# Patient Record
Sex: FEMALE | Race: WHITE | NOT HISPANIC OR LATINO | Employment: FULL TIME | URBAN - METROPOLITAN AREA
[De-identification: names, ages, dates, MRNs, and addresses within clinical notes are randomized per-mention and may not be internally consistent; named-entity substitution may affect disease eponyms.]

---

## 2017-02-17 ENCOUNTER — OFFICE VISIT (OUTPATIENT)
Dept: DERMATOLOGY | Facility: CLINIC | Age: 57
End: 2017-02-17
Payer: COMMERCIAL

## 2017-02-17 VITALS — HEIGHT: 66 IN | WEIGHT: 195 LBS | BODY MASS INDEX: 31.34 KG/M2

## 2017-02-17 DIAGNOSIS — L82.1 SEBORRHEIC KERATOSES: ICD-10-CM

## 2017-02-17 DIAGNOSIS — D18.01 CHERRY ANGIOMA: ICD-10-CM

## 2017-02-17 DIAGNOSIS — L57.0 ACTINIC KERATOSES: Primary | ICD-10-CM

## 2017-02-17 DIAGNOSIS — L81.4 SOLAR LENTIGO: ICD-10-CM

## 2017-02-17 PROCEDURE — 99999 PR PBB SHADOW E&M-EST. PATIENT-LVL II: CPT | Mod: PBBFAC,,, | Performed by: DERMATOLOGY

## 2017-02-17 PROCEDURE — 17000 DESTRUCT PREMALG LESION: CPT | Mod: S$GLB,,, | Performed by: DERMATOLOGY

## 2017-02-17 PROCEDURE — 17003 DESTRUCT PREMALG LES 2-14: CPT | Mod: S$GLB,,, | Performed by: DERMATOLOGY

## 2017-02-17 PROCEDURE — 99203 OFFICE O/P NEW LOW 30 MIN: CPT | Mod: 25,S$GLB,, | Performed by: DERMATOLOGY

## 2017-02-17 NOTE — PATIENT INSTRUCTIONS

## 2017-02-17 NOTE — MR AVS SNAPSHOT
"    [facility]  [address]  [place] [address]  Phone: [phone]                  Bing Burch   2017 7:45 AM   Office Visit    Description:  Female : 1960   Provider:  Sarah Sheridan MD   Department:  [facility]           Reason for Visit     Skin Check           Diagnoses this Visit        Comments    Actinic keratoses    -  Primary     Solar lentigo         Seborrheic keratoses         Cherry angioma                To Do List           Future Appointments        Provider Department Dept Phone    2017 8:00 AM Bill Friedman MD [facility] [phone]      Goals (5 Years of Data)     None      Follow-Up and Disposition     Return in about 1 year (around 2018).    Follow-up and Disposition History      [facility]     [facility] -  Assistance  Registered nurses in the [facility] provide clinical advisement, health education, appointment booking, and other advisory services.  Call for this free service at [phone].             Medications           Message regarding Medications     Verify the changes and/or additions to your medication regime listed below are the same as discussed with your clinician today.  If any of these changes or additions are incorrect, please notify your healthcare provider.             Verify that the below list of medications is an accurate representation of the medications you are currently taking.  If none reported, the list may be blank. If incorrect, please contact your healthcare provider. Carry this list with you in case of emergency.           Current Medications     amitriptyline (ELAVIL) 50 MG tablet     esomeprazole (NEXIUM) 40 MG capsule Take 1 capsule (40 mg total) by mouth before breakfast.           Clinical Reference Information           Your Vitals Were     Height Weight BMI          5' 6" (1.676 m) 88.5 kg (195 lb) 31.47 kg/m2      "   Allergies as of 2/17/2017     No Known Allergies      Immunizations Administered on Date of Encounter - 2/17/2017     None      Instructions    CRYOSURGERY      Your doctor has used a method called cryosurgery to treat your skin condition. Cryosurgery refers to the use of very cold substances to treat a variety of skin conditions such as warts, pre-skin cancers, molluscum contagiosum, sun spots, and several benign growths. The substance we use in cryosurgery is liquid nitrogen and is so cold (-195 degrees Celsius) that is burns when administered.     Following treatment in the office, the skin may immediately burn and become red. You may find the area around the lesion is affected as well. It is sometimes necessary to treat not only the lesion, but a small area of the surrounding normal skin to achieve a good response.     A blister, and even a blood filled blister, may form after treatment.   This is a normal response. If the blister is painful, it is acceptable to sterilize a needle and with rubbing alcohol and gently pop the blister. It is important that you gently wash the area with soap and warm water as the blister fluid may contain wart virus if a wart was treated. Do no remove the roof of the blister.     The area treated can take anywhere from 1-3 weeks to heal. Healing time depends on the kind skin lesion treated, the location, and how aggressively the lesion was treated. It is recommended that the areas treated are covered with Vaseline or bacitracin ointment and a band-aid. If a band-aid is not practical, just ointment applied several times per day will do. Keeping these areas moist will speed the healing time.    Treatment with liquid nitrogen can leave a scar. In dark skin, it may be a light or dark scar, in light skin it may be a white or pink scar. These will generally fade with time, but may never go away completely.     If you have any concerns after your treatment, please feel free to call the  office.         [facility]  [facility]  [address] [place] [address]  Dept: [phone]         Language Assistance Services     ATTENTION: Language assistance services are available, free of charge. Please call [phone].      ATENCIÓN: Si habla español, tiene a collins disposición servicios gratuitos de asistencia lingüística. Llame al [phone].     CHÚ Ý: N?u b?n nói Ti?ng Vi?t, có các d?ch v? h? tr? ngôn ng? mi?n phí dành cho b?n. G?i s? [phone].         [facility] complies with applicable Federal civil rights laws and does not discriminate on the basis of race, color, national origin, age, disability, or sex.

## 2017-02-17 NOTE — PROGRESS NOTES
Subjective:       Patient ID:  Bing Burch is a 56 y.o. female who presents for   Chief Complaint   Patient presents with    Skin Check     FBSE,      HPI Comments: Initial visit  H/o intense sun exposure, youth in [place], blistyering sunburns  No h/o skin cancer, + h/o AK's treated with cryo        Review of Systems   Constitutional: Negative for fever and chills.   Skin: Positive for daily sunscreen use, activity-related sunscreen use and wears hat. Negative for recent sunburn.   Hematologic/Lymphatic: Does not bruise/bleed easily.        Objective:    Physical Exam   Constitutional: She appears well-developed and well-nourished. No distress.   Neurological: She is alert and oriented to person, place, and time. She is not disoriented.   Psychiatric: She has a normal mood and affect.   Skin:   Areas Examined (abnormalities noted in diagram):   Scalp / Hair Palpated and Inspected  Head / Face Inspection Performed  Neck Inspection Performed  Chest / Axilla Inspection Performed  Abdomen Inspection Performed  Genitals / Buttocks / Groin Inspection Performed  Back Inspection Performed  RUE Inspected  LUE Inspection Performed  RLE Inspected  LLE Inspection Performed  Nails and Digits Inspection Performed                   Diagram Legend     Erythematous scaling macule/papule c/w actinic keratosis       Vascular papule c/w angioma      Pigmented verrucoid papule/plaque c/w seborrheic keratosis      Yellow umbilicated papule c/w sebaceous hyperplasia      Irregularly shaped tan macule c/w lentigo     1-2 mm smooth white papules consistent with Milia      Movable subcutaneous cyst with punctum c/w epidermal inclusion cyst      Subcutaneous movable cyst c/w pilar cyst      Firm pink to brown papule c/w dermatofibroma      Pedunculated fleshy papule(s) c/w skin tag(s)      Evenly pigmented macule c/w junctional nevus     Mildly variegated pigmented, slightly irregular-bordered macule c/w mildly atypical nevus       Flesh colored to evenly pigmented papule c/w intradermal nevus       Pink pearly papule/plaque c/w basal cell carcinoma      Erythematous hyperkeratotic cursted plaque c/w SCC      Surgical scar with no sign of skin cancer recurrence      Open and closed comedones      Inflammatory papules and pustules      Verrucoid papule consistent consistent with wart     Erythematous eczematous patches and plaques     Dystrophic onycholytic nail with subungual debris c/w onychomycosis     Umbilicated papule    Erythematous-base heme-crusted tan verrucoid plaque consistent with inflamed seborrheic keratosis     Erythematous Silvery Scaling Plaque c/w Psoriasis     See annotation      Assessment / Plan:        Actinic keratoses  Cryosurgery Procedure Note    Verbal consent from the patient is obtained and the patient is aware of the precancerous quality and need for treatment of these lesions. Liquid nitrogen cryosurgery is applied to the 3 actinic keratoses, as detailed in the physical exam, to produce a freeze injury. The patient is aware that blisters may form and is instructed on wound care with gentle cleansing and use of vaseline ointment to keep moist until healed. The patient is supplied a handout on cryosurgery and is instructed to call if lesions do not completely resolve.    Solar lentigo  This is a benign hyperpigmented sun induced lesion. Daily sun protection will reduce the number of new lesions. Treatment of these benign lesions are considered cosmetic.    Seborrheic keratoses  These are benign inherited growths without a malignant potential. Reassurance given to patient. No treatment is necessary.     Cherry angioma  This is a benign vascular lesion. Reassurance given. No treatment required.     Patient instructed in importance in daily sun protection of at least spf 30. Sun avoidance and topical protection discussed.   Recommend Elta MD (physician office) COTZ sensitive (available online) for daily use on face  and neck.  Patient encouraged to wear hat for all outdoor exposure.   Also discussed sun protective clothing.               Return in about 1 year (around 2/17/2018).

## 2017-05-23 ENCOUNTER — HOSPITAL ENCOUNTER (EMERGENCY)
Facility: HOSPITAL | Age: 57
Discharge: HOME | End: 2017-05-23
Attending: EMERGENCY MEDICINE
Payer: COMMERCIAL

## 2017-05-23 VITALS
DIASTOLIC BLOOD PRESSURE: 95 MMHG | HEIGHT: 66 IN | HEART RATE: 86 BPM | TEMPERATURE: 97 F | SYSTOLIC BLOOD PRESSURE: 151 MMHG | WEIGHT: 184 LBS | OXYGEN SATURATION: 95 % | BODY MASS INDEX: 29.57 KG/M2 | RESPIRATION RATE: 18 BRPM

## 2017-05-23 DIAGNOSIS — K85.00 IDIOPATHIC ACUTE PANCREATITIS WITHOUT INFECTION OR NECROSIS: Primary | ICD-10-CM

## 2017-05-23 DIAGNOSIS — R10.9 ABDOMINAL PAIN: ICD-10-CM

## 2017-05-23 DIAGNOSIS — E73.9 LACTOSE INTOLERANCE IN ADULT: ICD-10-CM

## 2017-05-23 LAB
ALBUMIN SERPL BCP-MCNC: 4.2 G/DL
ALP SERPL-CCNC: 76 U/L
ALT SERPL W/O P-5'-P-CCNC: 19 U/L
ANION GAP SERPL CALC-SCNC: 11 MMOL/L
AST SERPL-CCNC: 16 U/L
BACTERIA #/AREA URNS HPF: NORMAL /HPF
BASOPHILS # BLD AUTO: 0 K/UL
BASOPHILS NFR BLD: 0.5 %
BILIRUB SERPL-MCNC: 1.4 MG/DL
BILIRUB UR QL STRIP: NEGATIVE
BUN SERPL-MCNC: 11 MG/DL
CALCIUM SERPL-MCNC: 9.8 MG/DL
CHLORIDE SERPL-SCNC: 106 MMOL/L
CLARITY UR: CLEAR
CO2 SERPL-SCNC: 23 MMOL/L
COLOR UR: YELLOW
CREAT SERPL-MCNC: 0.8 MG/DL
DIFFERENTIAL METHOD: NORMAL
EOSINOPHIL # BLD AUTO: 0.2 K/UL
EOSINOPHIL NFR BLD: 2.9 %
ERYTHROCYTE [DISTWIDTH] IN BLOOD BY AUTOMATED COUNT: 12.8 %
EST. GFR  (AFRICAN AMERICAN): >60 ML/MIN/1.73 M^2
EST. GFR  (NON AFRICAN AMERICAN): >60 ML/MIN/1.73 M^2
GLUCOSE SERPL-MCNC: 94 MG/DL
GLUCOSE UR QL STRIP: NEGATIVE
HCT VFR BLD AUTO: 41.3 %
HGB BLD-MCNC: 13.9 G/DL
HGB UR QL STRIP: NEGATIVE
KETONES UR QL STRIP: NEGATIVE
LEUKOCYTE ESTERASE UR QL STRIP: ABNORMAL
LIPASE SERPL-CCNC: 36 U/L
LYMPHOCYTES # BLD AUTO: 2.1 K/UL
LYMPHOCYTES NFR BLD: 27.9 %
MCH RBC QN AUTO: 30.2 PG
MCHC RBC AUTO-ENTMCNC: 33.5 %
MCV RBC AUTO: 90 FL
MICROSCOPIC COMMENT: NORMAL
MONOCYTES # BLD AUTO: 0.7 K/UL
MONOCYTES NFR BLD: 9.8 %
NEUTROPHILS # BLD AUTO: 4.4 K/UL
NEUTROPHILS NFR BLD: 58.9 %
NITRITE UR QL STRIP: NEGATIVE
PH UR STRIP: 6 [PH] (ref 5–8)
PLATELET # BLD AUTO: 252 K/UL
PMV BLD AUTO: 10.2 FL
POTASSIUM SERPL-SCNC: 4 MMOL/L
PROT SERPL-MCNC: 7.6 G/DL
PROT UR QL STRIP: NEGATIVE
RBC # BLD AUTO: 4.59 M/UL
RBC #/AREA URNS HPF: 0 /HPF (ref 0–4)
SODIUM SERPL-SCNC: 140 MMOL/L
SP GR UR STRIP: 1.01 (ref 1–1.03)
SQUAMOUS #/AREA URNS HPF: 1 /HPF
TROPONIN I SERPL DL<=0.01 NG/ML-MCNC: 0.01 NG/ML
URN SPEC COLLECT METH UR: ABNORMAL
UROBILINOGEN UR STRIP-ACNC: NEGATIVE EU/DL
WBC # BLD AUTO: 7.4 K/UL
WBC #/AREA URNS HPF: 5 /HPF (ref 0–5)

## 2017-05-23 PROCEDURE — 96374 THER/PROPH/DIAG INJ IV PUSH: CPT

## 2017-05-23 PROCEDURE — 84484 ASSAY OF TROPONIN QUANT: CPT

## 2017-05-23 PROCEDURE — 25000003 PHARM REV CODE 250: Performed by: EMERGENCY MEDICINE

## 2017-05-23 PROCEDURE — 83690 ASSAY OF LIPASE: CPT

## 2017-05-23 PROCEDURE — 63600175 PHARM REV CODE 636 W HCPCS: Performed by: EMERGENCY MEDICINE

## 2017-05-23 PROCEDURE — 99284 EMERGENCY DEPT VISIT MOD MDM: CPT | Mod: 25

## 2017-05-23 PROCEDURE — 85025 COMPLETE CBC W/AUTO DIFF WBC: CPT

## 2017-05-23 PROCEDURE — 81000 URINALYSIS NONAUTO W/SCOPE: CPT

## 2017-05-23 PROCEDURE — 93005 ELECTROCARDIOGRAM TRACING: CPT

## 2017-05-23 PROCEDURE — 80053 COMPREHEN METABOLIC PANEL: CPT

## 2017-05-23 PROCEDURE — 36415 COLL VENOUS BLD VENIPUNCTURE: CPT

## 2017-05-23 PROCEDURE — 25500020 PHARM REV CODE 255

## 2017-05-23 RX ORDER — ONDANSETRON 2 MG/ML
4 INJECTION INTRAMUSCULAR; INTRAVENOUS
Status: COMPLETED | OUTPATIENT
Start: 2017-05-23 | End: 2017-05-23

## 2017-05-23 RX ORDER — HYDROCODONE BITARTRATE AND ACETAMINOPHEN 5; 325 MG/1; MG/1
1 TABLET ORAL EVERY 8 HOURS PRN
Qty: 12 TABLET | Refills: 0 | Status: SHIPPED | OUTPATIENT
Start: 2017-05-23 | End: 2017-05-27

## 2017-05-23 RX ORDER — ESOMEPRAZOLE MAGNESIUM 40 MG/1
40 CAPSULE, DELAYED RELEASE ORAL DAILY
Qty: 30 CAPSULE | Refills: 1 | Status: SHIPPED | OUTPATIENT
Start: 2017-05-23 | End: 2018-03-29

## 2017-05-23 RX ORDER — ONDANSETRON 4 MG/1
4 TABLET, FILM COATED ORAL EVERY 6 HOURS
Qty: 12 TABLET | Refills: 0 | Status: SHIPPED | OUTPATIENT
Start: 2017-05-23 | End: 2017-10-17 | Stop reason: ALTCHOICE

## 2017-05-23 RX ADMIN — ONDANSETRON 4 MG: 2 INJECTION INTRAMUSCULAR; INTRAVENOUS at 11:05

## 2017-05-23 RX ADMIN — LIDOCAINE HYDROCHLORIDE: 20 SOLUTION ORAL; TOPICAL at 11:05

## 2017-05-23 RX ADMIN — IOHEXOL 75 ML: 350 INJECTION, SOLUTION INTRAVENOUS at 12:05

## 2017-05-23 NOTE — ED PROVIDER NOTES
"Encounter Date: 5/23/2017    SCRIBE #1 NOTE: I, Jarred Mukherjee, am scribing for, and in the presence of, Dr. Vallejo.       History     Chief Complaint   Patient presents with    Abdominal Pain     "started in my stomach and radiates into my chest and into by back" x1 day     Review of patient's allergies indicates:  No Known Allergies  05/23/2017  10:42 AM     Chief Complaint: Epigastric Abd pain      The patient is a 56 y.o. female with a PMHx of GERD who is presenting with an acute onset of epigastric abd pain that started yesterday. Her epigastric pain radiates up to her chest and to the center of her back. Pt described her pain as "excruciating" and rated her pain as an 11 out of 10. She reported a hx of similar pain 10 yrs ago, but a cause was never determined. Associated symptoms of nausea, left flank pain, and tingling in toes and fingertips. Pt also c/o constipation for a couple of days and she took 1 stool softener 2 days ago and 2 stool softeners 1 day ago. She c/o recently having trouble with gas. Pt reported taking Advil a couple of times per week and Nexium for GERD as needed. No SOB. No vomiting or diarrhea. No dysuria, difficultly urinating, or hematuria. No hx of kidney stones. No frequent EtOH use. No recent new medications. Pt has a past surgical history that includes Hernia repair; Tumor excision; Tonsillectomy; and Sinus surgery.        The history is provided by the patient.     Past Medical History:   Diagnosis Date    GERD (gastroesophageal reflux disease)      Past Surgical History:   Procedure Laterality Date    HERNIA REPAIR      SINUS SURGERY      TONSILLECTOMY      TUMOR EXCISION       Family History   Problem Relation Age of Onset    Psoriasis Brother     Eczema Neg Hx     Lupus Neg Hx     Melanoma Neg Hx      Social History   Substance Use Topics    Smoking status: Former Smoker     Quit date: 9/13/1989    Smokeless tobacco: Not on file    Alcohol use Yes      Comment: " occasionally     Review of Systems   Constitutional: Negative for fever.   HENT: Negative for congestion.    Eyes: Negative for visual disturbance.   Respiratory: Negative for shortness of breath and wheezing.    Cardiovascular: Positive for chest pain.   Gastrointestinal: Positive for abdominal pain (Epigastric abd pain.), constipation and nausea. Negative for diarrhea and vomiting.   Genitourinary: Positive for flank pain (Left flank pain.). Negative for difficulty urinating, dysuria and hematuria.   Musculoskeletal: Positive for back pain.   Skin: Negative for rash.   Neurological: Negative for syncope.        +Tingling in toes and fingertips.   Hematological: Does not bruise/bleed easily.   Psychiatric/Behavioral: Negative for confusion.       Physical Exam     Initial Vitals [05/23/17 1031]   BP Pulse Resp Temp SpO2   (!) 151/95 86 18 97.1 °F (36.2 °C) 95 %     Physical Exam    Nursing note and vitals reviewed.  Constitutional: She appears well-nourished.   HENT:   Head: Normocephalic and atraumatic.   Eyes: Conjunctivae and EOM are normal.   Neck: Normal range of motion. Neck supple. No thyroid mass present.   Cardiovascular: Normal rate and regular rhythm.   Abdominal: She exhibits no distension. There is tenderness (Minimal epigastric tenderness.). There is no rigidity.   Musculoskeletal: Normal range of motion.   Neurological: She is alert and oriented to person, place, and time.   Skin: Skin is warm and dry. No rash noted. No erythema.   Psychiatric: She has a normal mood and affect. Her speech is normal. Cognition and memory are normal.         ED Course   Procedures  Labs Reviewed - No data to display  EKG Readings: (Independently Interpreted)   Heart Rate: 75 bpm.   NSR.  Normal axis.  Normal intervals.  No acute ischemic changes.          Medical Decision Making:   Initial Assessment:   This patient was interviewed and examined and found to be in no acute distress.  At this time she is reporting  improvement in symptoms compared with earlier in the day.  She does report frequent worsening of bloating and gas production after starting her [employer] diet over the past 3-4 weeks which has also been accompanied by large amount of dairy food increase.  Screening lab work and imaging will be obtained to rule out evidence underlying severe organic disease as a cause.  Patient did have significant improvement in symptoms with Zofran and GI cocktail.  Differential Diagnosis:   DDX include, but are not limited to, atypical ACS, pancreatitis, cholecystitis, biliary colic, gastritis, obstruction, perforation, pulmonary embolism, thoracic aortic aneurysm  ED Management:  Screening lab work, including troponin and a CT scan of the abdomen and pelvis with contrast are unremarkable with exception of noted postoperative changes from prior Nissen fundoplication.  Patient does have very mild changes of acute pancreatitis which can be correlated clinically.  On reassessment she does report improvement and would like to attempt to try to control symptoms at home.  Reported no severe pain and vomiting since being here and I do think this is appropriate.  She will be sent home with a prescription for analgesic, antiemetic, and antitussive medication.  She is asked to maintain a bland diet and abstain from dairy foods, as I do think there is an underlying level of lactose intolerance instigating or worsening symptoms.  She is asked follow-up with her primary care doctor soon as possible or to return to the ER for any new, concerning, or worsening symptoms.  The patient was agreeable with this plan for follow-up she was discharged in stable condition with her  as .            Scribe Attestation:   Scribe #1: I performed the above scribed service and the documentation accurately describes the services I performed. I attest to the accuracy of the note.    Attending Attestation:           Physician Attestation for  Scribe:  Physician Attestation Statement for Scribe #1: I, Dr. Vallejo, reviewed documentation, as scribed by Jarred Mukherjee in my presence, and it is both accurate and complete.                 ED Course     Clinical Impression:   The primary encounter diagnosis was Idiopathic acute pancreatitis without infection or necrosis. Diagnoses of Abdominal pain and Lactose intolerance in adult were also pertinent to this visit.         Disposition:   Disposition: Discharged  Condition: Stable       Marco Antonio Vallejo MD  05/23/17 5495

## 2017-06-12 ENCOUNTER — DOCUMENTATION ONLY (OUTPATIENT)
Dept: FAMILY MEDICINE | Facility: CLINIC | Age: 57
End: 2017-06-12

## 2017-06-12 NOTE — PROGRESS NOTES
Pre-Visit Chart Review  For Appointment Scheduled on (06/13/17)    Health Maintenance Due   Topic Date Due    Lipid Panel  1960    TETANUS VACCINE  11/30/1977    Pap Smear with HPV Cotest  11/30/1980    Colonoscopy  11/30/2009

## 2017-06-13 ENCOUNTER — OFFICE VISIT (OUTPATIENT)
Dept: FAMILY MEDICINE | Facility: CLINIC | Age: 57
End: 2017-06-13
Payer: COMMERCIAL

## 2017-06-13 VITALS
WEIGHT: 188.69 LBS | HEIGHT: 66 IN | HEART RATE: 73 BPM | DIASTOLIC BLOOD PRESSURE: 76 MMHG | SYSTOLIC BLOOD PRESSURE: 114 MMHG | BODY MASS INDEX: 30.33 KG/M2

## 2017-06-13 DIAGNOSIS — Z87.19 HISTORY OF PANCREATITIS: Primary | ICD-10-CM

## 2017-06-13 PROCEDURE — 99213 OFFICE O/P EST LOW 20 MIN: CPT | Mod: S$GLB,,, | Performed by: FAMILY MEDICINE

## 2017-06-13 PROCEDURE — 99999 PR PBB SHADOW E&M-EST. PATIENT-LVL III: CPT | Mod: PBBFAC,,, | Performed by: FAMILY MEDICINE

## 2017-06-13 RX ORDER — HYDROCODONE BITARTRATE AND ACETAMINOPHEN 5; 325 MG/1; MG/1
1 TABLET ORAL EVERY 6 HOURS PRN
COMMUNITY
End: 2017-10-17 | Stop reason: ALTCHOICE

## 2017-06-13 NOTE — PROGRESS NOTES
Subjective:       Patient ID: Bing Burch is a 56 y.o. female.    Chief Complaint: Other (ED f/u acute pancreatitis)    HPI     ER follow up  Pt was recently seen in ED for abdominal pain.   She had a work up that revealed mild pancreatitis.   She reports that she has had a Nissen procedure performed around 2013.   Pt occasionally drinks EtOH but not often.   She has no history of EtOH abuse.   Pt has not had any abdominal pain.   She has made attempts at improving her diet with decreased fat and dairy.    Review of Systems   Constitutional: Negative for activity change and unexpected weight change.   HENT: Negative for hearing loss, rhinorrhea and trouble swallowing.    Eyes: Negative for discharge and visual disturbance.   Respiratory: Negative for chest tightness and wheezing.    Cardiovascular: Negative for chest pain and palpitations.   Gastrointestinal: Positive for constipation. Negative for blood in stool, diarrhea and vomiting.        Recent RUQ abdominal pain. Not currently present.   Endocrine: Negative for polydipsia and polyuria.   Genitourinary: Negative for difficulty urinating, dysuria, hematuria and menstrual problem.   Musculoskeletal: Negative for arthralgias, joint swelling and neck pain.   Neurological: Negative for weakness and headaches.   Psychiatric/Behavioral: Negative for confusion and dysphoric mood.       Objective:      Physical Exam   Constitutional: She appears well-developed and well-nourished.   HENT:   Head: Normocephalic and atraumatic.   Eyes: Conjunctivae are normal.   Neck: Normal range of motion.   Pulmonary/Chest: Effort normal.   Skin: Skin is warm.   Psychiatric: She has a normal mood and affect. Her behavior is normal.     Exam limited due to unforseen issues unrelated to this patient.  Assessment:       1. History of pancreatitis        Plan:       1. History of pancreatitis  - Ambulatory referral to Gastroenterology  - Lipid panel; Future    Portions of this  note were created using Dragon voice recognition software. There may be voice recognition errors found in the text, and attempts were made to correct these errors prior to signature    Bill Friedman MD    Family Medicine  6/13/2017

## 2017-07-20 ENCOUNTER — PATIENT MESSAGE (OUTPATIENT)
Dept: GASTROENTEROLOGY | Facility: CLINIC | Age: 57
End: 2017-07-20

## 2017-07-20 ENCOUNTER — TELEPHONE (OUTPATIENT)
Dept: GASTROENTEROLOGY | Facility: CLINIC | Age: 57
End: 2017-07-20

## 2017-08-01 ENCOUNTER — OFFICE VISIT (OUTPATIENT)
Dept: GASTROENTEROLOGY | Facility: CLINIC | Age: 57
End: 2017-08-01
Payer: COMMERCIAL

## 2017-08-01 VITALS
DIASTOLIC BLOOD PRESSURE: 70 MMHG | WEIGHT: 188.5 LBS | HEART RATE: 83 BPM | BODY MASS INDEX: 30.29 KG/M2 | SYSTOLIC BLOOD PRESSURE: 121 MMHG | HEIGHT: 66 IN

## 2017-08-01 DIAGNOSIS — K21.9 GASTROESOPHAGEAL REFLUX DISEASE, ESOPHAGITIS PRESENCE NOT SPECIFIED: Primary | ICD-10-CM

## 2017-08-01 DIAGNOSIS — K59.09 CHRONIC CONSTIPATION: ICD-10-CM

## 2017-08-01 DIAGNOSIS — Z98.890 HISTORY OF NISSEN FUNDOPLICATION: ICD-10-CM

## 2017-08-01 DIAGNOSIS — R10.13 EPIGASTRIC PAIN: ICD-10-CM

## 2017-08-01 DIAGNOSIS — R93.89 ABNORMAL CT SCAN: ICD-10-CM

## 2017-08-01 PROCEDURE — 99999 PR PBB SHADOW E&M-EST. PATIENT-LVL III: CPT | Mod: PBBFAC,,, | Performed by: INTERNAL MEDICINE

## 2017-08-01 PROCEDURE — 99244 OFF/OP CNSLTJ NEW/EST MOD 40: CPT | Mod: S$GLB,,, | Performed by: INTERNAL MEDICINE

## 2017-08-01 NOTE — PROGRESS NOTES
Subjective:       Patient ID: Bing Burch is a 56 y.o. female.    This patient is new to me.  Referring MD:  Dr. Friedman for GERD.      Chief Complaint: Gastroesophageal Reflux    Gastroesophageal Reflux   She complains of abdominal pain (epigastric) and heartburn. She reports no chest pain, no coughing, no dysphagia, no nausea, no sore throat or no wheezing. This is a new problem. The current episode started more than 1 month ago (about 6 months). The problem occurs frequently. The problem has been unchanged (abrupt onset). The heartburn duration is several minutes. The heartburn is of moderate intensity. The heartburn wakes her from sleep. The heartburn does not limit her activity. The heartburn changes with position. The symptoms are aggravated by certain foods. Pertinent negatives include no anemia, fatigue, melena or weight loss. Risk factors include hiatal hernia (Had Nissen in 2013 with no symptoms after surgery until now.  Prior to surgery had severe GERD). She has tried a PPI for the symptoms. The treatment provided mild relief. Past procedures include an EGD (last one was before surgery). Colonoscopy 7 years ago was normal per patient. Past invasive treatments include gastroplication.   Notes from Dr. Friedman indicate that the above symptoms are now ongoing x several months and she is referred for further evaluation.  She also has chronic constipation but no bleeding or weight loss.      Review of Systems   Constitutional: Negative for chills, fatigue, fever and weight loss.   HENT: Negative for sore throat and trouble swallowing.    Respiratory: Negative for cough, shortness of breath and wheezing.    Cardiovascular: Negative for chest pain and palpitations.   Gastrointestinal: Positive for abdominal pain (epigastric) and heartburn. Negative for blood in stool, dysphagia, melena, nausea and vomiting.   Genitourinary: Negative for dysuria and hematuria.   Musculoskeletal: Negative for arthralgias  "and myalgias.   Skin: Negative for color change and rash.   Neurological: Negative for dizziness and headaches.   Hematological: Negative for adenopathy.   Psychiatric/Behavioral: Negative for confusion. The patient is not nervous/anxious.    All other systems reviewed and are negative.      Objective:       Vitals:    08/01/17 1447   BP: 121/70   Pulse: 83   Weight: 85.5 kg (188 lb 7.9 oz)   Height: 5' 6" (1.676 m)       Physical Exam   Constitutional: She appears well-developed and well-nourished.   HENT:   Head: Normocephalic and atraumatic.   Eyes: Pupils are equal, round, and reactive to light. No scleral icterus.   Neck: Normal range of motion.   Cardiovascular: Normal rate and regular rhythm.    No murmur heard.  Pulmonary/Chest: Effort normal and breath sounds normal. She has no wheezes.   Abdominal: Soft. Bowel sounds are normal. She exhibits no distension. There is no tenderness.   Musculoskeletal: She exhibits no edema or tenderness.   Lymphadenopathy:     She has no cervical adenopathy.   Neurological: She is alert.   Skin: Skin is warm and dry. No rash noted.         Lab Results   Component Value Date    WBC 7.40 05/23/2017    HGB 13.9 05/23/2017    HCT 41.3 05/23/2017    MCV 90 05/23/2017     05/23/2017       CMP  Sodium   Date Value Ref Range Status   05/23/2017 140 136 - 145 mmol/L Final     Potassium   Date Value Ref Range Status   05/23/2017 4.0 3.5 - 5.1 mmol/L Final     Chloride   Date Value Ref Range Status   05/23/2017 106 95 - 110 mmol/L Final     CO2   Date Value Ref Range Status   05/23/2017 23 23 - 29 mmol/L Final     Glucose   Date Value Ref Range Status   05/23/2017 94 70 - 110 mg/dL Final     BUN, Bld   Date Value Ref Range Status   05/23/2017 11 6 - 20 mg/dL Final     Creatinine   Date Value Ref Range Status   05/23/2017 0.8 0.5 - 1.4 mg/dL Final     Calcium   Date Value Ref Range Status   05/23/2017 9.8 8.7 - 10.5 mg/dL Final     Total Protein   Date Value Ref Range Status "   05/23/2017 7.6 6.0 - 8.4 g/dL Final     Albumin   Date Value Ref Range Status   05/23/2017 4.2 3.5 - 5.2 g/dL Final     Total Bilirubin   Date Value Ref Range Status   05/23/2017 1.4 (H) 0.1 - 1.0 mg/dL Final     Comment:     For infants and newborns, interpretation of results should be based  on gestational age, weight and in agreement with clinical  observations.  Premature Infant recommended reference ranges:  Up to 24 hours.............<8.0 mg/dL  Up to 48 hours............<12.0 mg/dL  3-5 days..................<15.0 mg/dL  6-29 days.................<15.0 mg/dL       Alkaline Phosphatase   Date Value Ref Range Status   05/23/2017 76 55 - 135 U/L Final     AST   Date Value Ref Range Status   05/23/2017 16 10 - 40 U/L Final     ALT   Date Value Ref Range Status   05/23/2017 19 10 - 44 U/L Final     Anion Gap   Date Value Ref Range Status   05/23/2017 11 8 - 16 mmol/L Final     eGFR if    Date Value Ref Range Status   05/23/2017 >60 >60 mL/min/1.73 m^2 Final     eGFR if non    Date Value Ref Range Status   05/23/2017 >60 >60 mL/min/1.73 m^2 Final     Comment:     Calculation used to obtain the estimated glomerular filtration  rate (eGFR) is the CKD-EPI equation. Since race is unknown   in our information system, the eGFR values for   -American and Non--American patients are given   for each creatinine result.         CT scan was independently visualized and reviewed by me and showed fundoplication.  She also had questionable haziness at pancreatic body with no mass noted and normal enzymes.    Old records from Dr. Friedman reviewed and are as summarized above in the HPI.      Assessment:       1. Gastroesophageal reflux disease, esophagitis presence not specified    2. Chronic constipation    3. Epigastric pain    4. Abnormal CT scan    5. History of Nissen fundoplication        Plan:       1.  Schedule EGD  2.  Continue PPI  3.  Colonoscopy due for screening in 3  years  4.  Recommend daily exercise, adequate water intake, and high fiber diet.  Recommend daily miralax (17g PO once or twice daily) with intermittently dosed dulcolax (every 2-3 days) to facilitate bowel movements.  If no relief with this, consider adding emollient laxative (castor oil or mineral oil) +/- enema.  5.  Consider follow up imaging of pancreas at some point but will review CT with radiology first to see if this is necessary  6.  Further recommendations to follow after above.  7.  Communication will be sent to the referring MD, Dr. Friedman regarding my assessment and plan on this patient via EPIC.

## 2017-08-02 ENCOUNTER — LAB VISIT (OUTPATIENT)
Dept: LAB | Facility: HOSPITAL | Age: 57
End: 2017-08-02
Attending: FAMILY MEDICINE
Payer: COMMERCIAL

## 2017-08-02 ENCOUNTER — TELEPHONE (OUTPATIENT)
Dept: GASTROENTEROLOGY | Facility: CLINIC | Age: 57
End: 2017-08-02

## 2017-08-02 DIAGNOSIS — Z87.19 HISTORY OF PANCREATITIS: ICD-10-CM

## 2017-08-02 DIAGNOSIS — R93.89 ABNORMAL CT SCAN: Primary | ICD-10-CM

## 2017-08-02 LAB
CHOLEST/HDLC SERPL: 3.1 {RATIO}
HDL/CHOLESTEROL RATIO: 32.4 %
HDLC SERPL-MCNC: 238 MG/DL
HDLC SERPL-MCNC: 77 MG/DL
LDLC SERPL CALC-MCNC: 137.8 MG/DL
NONHDLC SERPL-MCNC: 161 MG/DL
TRIGL SERPL-MCNC: 116 MG/DL

## 2017-08-02 PROCEDURE — 80061 LIPID PANEL: CPT

## 2017-08-02 PROCEDURE — 36415 COLL VENOUS BLD VENIPUNCTURE: CPT | Mod: PO

## 2017-08-03 ENCOUNTER — TELEPHONE (OUTPATIENT)
Dept: FAMILY MEDICINE | Facility: CLINIC | Age: 57
End: 2017-08-03

## 2017-08-03 NOTE — TELEPHONE ENCOUNTER
The 10-year ASCVD risk score (Roma MEJIAS Jr., et al., 2013) is: 1.9%    Values used to calculate the score:      Age: 56 years      Sex: Female      Is Non- : No      Diabetic: No      Tobacco smoker: No      Systolic Blood Pressure: 121 mmHg      Is BP treated: No      HDL Cholesterol: 77 mg/dL      Total Cholesterol: 238 mg/dL

## 2017-08-08 ENCOUNTER — HOSPITAL ENCOUNTER (OUTPATIENT)
Dept: RADIOLOGY | Facility: HOSPITAL | Age: 57
Discharge: HOME | End: 2017-08-08
Attending: INTERNAL MEDICINE
Payer: COMMERCIAL

## 2017-08-08 DIAGNOSIS — R93.89 ABNORMAL CT SCAN: ICD-10-CM

## 2017-08-08 PROCEDURE — 76376 3D RENDER W/INTRP POSTPROCES: CPT | Mod: 26,,, | Performed by: RADIOLOGY

## 2017-08-08 PROCEDURE — 25500020 PHARM REV CODE 255: Performed by: INTERNAL MEDICINE

## 2017-08-08 PROCEDURE — 74183 MRI ABD W/O CNTR FLWD CNTR: CPT | Mod: 26,,, | Performed by: RADIOLOGY

## 2017-08-08 PROCEDURE — A9585 GADOBUTROL INJECTION: HCPCS | Performed by: INTERNAL MEDICINE

## 2017-08-08 PROCEDURE — 74183 MRI ABD W/O CNTR FLWD CNTR: CPT | Mod: TC

## 2017-08-08 PROCEDURE — 76376 3D RENDER W/INTRP POSTPROCES: CPT | Mod: TC

## 2017-08-08 RX ORDER — GADOBUTROL 604.72 MG/ML
INJECTION INTRAVENOUS
Status: DISPENSED
Start: 2017-08-08 | End: 2017-08-08

## 2017-08-08 RX ORDER — GADOBUTROL 604.72 MG/ML
8 INJECTION INTRAVENOUS
Status: COMPLETED | OUTPATIENT
Start: 2017-08-08 | End: 2017-08-08

## 2017-08-08 RX ADMIN — GADOBUTROL 8 ML: 604.72 INJECTION INTRAVENOUS at 08:08

## 2017-08-09 ENCOUNTER — TELEPHONE (OUTPATIENT)
Dept: GASTROENTEROLOGY | Facility: CLINIC | Age: 57
End: 2017-08-09

## 2017-08-18 ENCOUNTER — ANESTHESIA (OUTPATIENT)
Dept: ENDOSCOPY | Facility: HOSPITAL | Age: 57
End: 2017-08-18
Payer: COMMERCIAL

## 2017-08-18 ENCOUNTER — ANESTHESIA EVENT (OUTPATIENT)
Dept: ENDOSCOPY | Facility: HOSPITAL | Age: 57
End: 2017-08-18
Payer: COMMERCIAL

## 2017-08-18 ENCOUNTER — HOSPITAL ENCOUNTER (OUTPATIENT)
Facility: HOSPITAL | Age: 57
Discharge: HOME | End: 2017-08-18
Attending: INTERNAL MEDICINE | Admitting: INTERNAL MEDICINE
Payer: COMMERCIAL

## 2017-08-18 ENCOUNTER — SURGERY (OUTPATIENT)
Age: 57
End: 2017-08-18

## 2017-08-18 VITALS — RESPIRATION RATE: 19 BRPM

## 2017-08-18 DIAGNOSIS — K29.70 GASTRITIS, PRESENCE OF BLEEDING UNSPECIFIED, UNSPECIFIED CHRONICITY, UNSPECIFIED GASTRITIS TYPE: ICD-10-CM

## 2017-08-18 DIAGNOSIS — K21.9 GASTROESOPHAGEAL REFLUX DISEASE WITHOUT ESOPHAGITIS: ICD-10-CM

## 2017-08-18 DIAGNOSIS — K31.7 GASTRIC POLYP: Primary | ICD-10-CM

## 2017-08-18 DIAGNOSIS — K21.9 ACID REFLUX: ICD-10-CM

## 2017-08-18 PROCEDURE — 43239 EGD BIOPSY SINGLE/MULTIPLE: CPT | Mod: 59,,, | Performed by: INTERNAL MEDICINE

## 2017-08-18 PROCEDURE — D9220A PRA ANESTHESIA: Mod: CRNA,,, | Performed by: NURSE ANESTHETIST, CERTIFIED REGISTERED

## 2017-08-18 PROCEDURE — 43239 EGD BIOPSY SINGLE/MULTIPLE: CPT | Performed by: INTERNAL MEDICINE

## 2017-08-18 PROCEDURE — 27201012 HC FORCEPS, HOT/COLD, DISP: Performed by: INTERNAL MEDICINE

## 2017-08-18 PROCEDURE — 63600175 PHARM REV CODE 636 W HCPCS: Performed by: NURSE ANESTHETIST, CERTIFIED REGISTERED

## 2017-08-18 PROCEDURE — 37000008 HC ANESTHESIA 1ST 15 MINUTES: Performed by: INTERNAL MEDICINE

## 2017-08-18 PROCEDURE — 88342 IMHCHEM/IMCYTCHM 1ST ANTB: CPT | Mod: 26,,, | Performed by: PATHOLOGY

## 2017-08-18 PROCEDURE — 27201089 HC SNARE, DISP (ANY): Performed by: INTERNAL MEDICINE

## 2017-08-18 PROCEDURE — 88305 TISSUE EXAM BY PATHOLOGIST: CPT | Performed by: PATHOLOGY

## 2017-08-18 PROCEDURE — 88305 TISSUE EXAM BY PATHOLOGIST: CPT | Mod: 26,,, | Performed by: PATHOLOGY

## 2017-08-18 PROCEDURE — 43251 EGD REMOVE LESION SNARE: CPT | Mod: ,,, | Performed by: INTERNAL MEDICINE

## 2017-08-18 PROCEDURE — 25000003 PHARM REV CODE 250: Performed by: INTERNAL MEDICINE

## 2017-08-18 PROCEDURE — 37000009 HC ANESTHESIA EA ADD 15 MINS: Performed by: INTERNAL MEDICINE

## 2017-08-18 PROCEDURE — 43251 EGD REMOVE LESION SNARE: CPT | Performed by: INTERNAL MEDICINE

## 2017-08-18 PROCEDURE — D9220A PRA ANESTHESIA: Mod: ANES,,, | Performed by: ANESTHESIOLOGY

## 2017-08-18 RX ORDER — SODIUM CHLORIDE 9 MG/ML
INJECTION, SOLUTION INTRAVENOUS CONTINUOUS
Status: DISCONTINUED | OUTPATIENT
Start: 2017-08-18 | End: 2017-08-18 | Stop reason: HOSPADM

## 2017-08-18 RX ORDER — LIDOCAINE HYDROCHLORIDE 20 MG/ML
INJECTION, SOLUTION EPIDURAL; INFILTRATION; INTRACAUDAL; PERINEURAL
Status: DISCONTINUED
Start: 2017-08-18 | End: 2017-08-18 | Stop reason: HOSPADM

## 2017-08-18 RX ORDER — LIDOCAINE HCL/PF 100 MG/5ML
SYRINGE (ML) INTRAVENOUS
Status: DISCONTINUED | OUTPATIENT
Start: 2017-08-18 | End: 2017-08-18

## 2017-08-18 RX ORDER — PROPOFOL 10 MG/ML
INJECTION, EMULSION INTRAVENOUS
Status: DISCONTINUED
Start: 2017-08-18 | End: 2017-08-18 | Stop reason: HOSPADM

## 2017-08-18 RX ORDER — PROPOFOL 10 MG/ML
VIAL (ML) INTRAVENOUS
Status: DISCONTINUED | OUTPATIENT
Start: 2017-08-18 | End: 2017-08-18

## 2017-08-18 RX ADMIN — PROPOFOL 50 MG: 10 INJECTION, EMULSION INTRAVENOUS at 09:08

## 2017-08-18 RX ADMIN — LIDOCAINE HYDROCHLORIDE 100 MG: 20 INJECTION, SOLUTION INTRAVENOUS at 09:08

## 2017-08-18 RX ADMIN — SODIUM CHLORIDE 1000 ML: 0.9 INJECTION, SOLUTION INTRAVENOUS at 09:08

## 2017-08-18 RX ADMIN — PROPOFOL 100 MG: 10 INJECTION, EMULSION INTRAVENOUS at 09:08

## 2017-08-18 NOTE — TRANSFER OF CARE
"Anesthesia Transfer of Care Note    Patient: Bing Burch    Procedure(s) Performed: Procedure(s) (LRB):  ESOPHAGOGASTRODUODENOSCOPY (EGD) (N/A)    Patient location: GI    Anesthesia Type: general    Transport from OR: Transported from OR on room air with adequate spontaneous ventilation    Post pain: adequate analgesia    Post assessment: no apparent anesthetic complications    Post vital signs: stable    Level of consciousness: awake    Nausea/Vomiting: no nausea/vomiting    Complications: none    Transfer of care protocol was followed      Last vitals:   Visit Vitals  BP (!) 129/59   Pulse 77   Temp 36.4 °C (97.6 °F) (Oral)   Resp 17   Ht 5' 6" (1.676 m)   Wt 85.3 kg (188 lb)   SpO2 96%   Breastfeeding? No   BMI 30.34 kg/m²     "

## 2017-08-18 NOTE — PLAN OF CARE
Pt. Awake, alert and oriented.  No complaints of pain.  Vitals within defined limits.  Ready for discharge.  Meets discharge criteria.

## 2017-08-18 NOTE — ANESTHESIA POSTPROCEDURE EVALUATION
"Anesthesia Post Evaluation    Patient: Bing Burch    Procedure(s) Performed: Procedure(s) (LRB):  ESOPHAGOGASTRODUODENOSCOPY (EGD) (N/A)    Final Anesthesia Type: general  Patient location during evaluation: PACU  Patient participation: Yes- Able to Participate  Level of consciousness: awake and alert  Post-procedure vital signs: reviewed and stable  Pain management: adequate  Airway patency: patent  PONV status at discharge: No PONV  Anesthetic complications: no      Cardiovascular status: hemodynamically stable  Respiratory status: unassisted and room air  Hydration status: euvolemic  Follow-up not needed.        Visit Vitals  /61   Pulse 72   Temp 36.6 °C (97.9 °F)   Resp 13   Ht 5' 6" (1.676 m)   Wt 85.3 kg (188 lb)   SpO2 99%   Breastfeeding? No   BMI 30.34 kg/m²       Pain/Bran Score: Pain Assessment Performed: Yes (8/18/2017  9:49 AM)  Presence of Pain: denies (8/18/2017 10:25 AM)  Bran Score: 10 (8/18/2017 10:20 AM)      "

## 2017-08-18 NOTE — ANESTHESIA PREPROCEDURE EVALUATION
08/18/2017  Bing Burch is a 56 y.o., female.    Anesthesia Evaluation    I have reviewed the Patient Summary Reports.    I have reviewed the Nursing Notes.   I have reviewed the Medications.     Review of Systems  Anesthesia Hx:  No problems with previous Anesthesia    Social:  Former Smoker    Hematology/Oncology:  Hematology Normal   Oncology Normal     EENT/Dental:EENT/Dental Normal   Cardiovascular:  Cardiovascular Normal     Pulmonary:  Pulmonary Normal    Renal/:  Renal/ Normal     Hepatic/GI:   GERD    Musculoskeletal:  Musculoskeletal Normal    Neurological:  Neurology Normal    Endocrine:  Endocrine Normal    Dermatological:  Skin Normal    Psych:  Psychiatric Normal           Physical Exam  General:  Obesity    Airway/Jaw/Neck:  Airway Findings: Mouth Opening: Normal Tongue: Normal  General Airway Assessment: Adult  Mallampati: I  TM Distance: Normal, at least 6 cm        Eyes/Ears/Nose:  EYES/EARS/NOSE FINDINGS: Normal   Dental:  DENTAL FINDINGS: Normal   Chest/Lungs:  Chest/Lungs Findings: Clear to auscultation, Normal Respiratory Rate     Heart/Vascular:  Heart Findings: Rate: Normal  Rhythm: Regular Rhythm  Sounds: Normal     Abdomen:  Abdomen Findings: Normal    Musculoskeletal:  Musculoskeletal Findings: Normal   Skin:  Skin Findings: Normal    Mental Status:  Mental Status Findings: Normal        Anesthesia Plan  Type of Anesthesia, risks & benefits discussed:  Anesthesia Type:  general  Patient's Preference:   Intra-op Monitoring Plan: standard ASA monitors  Intra-op Monitoring Plan Comments:   Post Op Pain Control Plan:   Post Op Pain Control Plan Comments:   Induction:    Beta Blocker:  Patient is not currently on a Beta-Blocker (No further documentation required).       Informed Consent: Patient understands risks and agrees with Anesthesia plan.  Questions answered.  Anesthesia consent signed with patient.  ASA Score: 2     Day of Surgery Review of History & Physical: I have interviewed and examined the patient. I have reviewed the patient's H&P dated:    H&P update referred to the provider.         Ready For Surgery From Anesthesia Perspective.

## 2017-08-18 NOTE — DISCHARGE INSTRUCTIONS
Have you had a colonoscopy LESS THAN 3 years ago?   * If YES, answer these questions*:    1. Did patient have a prior colonic polyp in a previous surveillance/diagnostic colonoscopy and is 18 years or older on date of encounter?    2. Documentation of < 3 year interval since the patients last colonoscopy due to medical reasons (eg., last colonoscopy incomplete, last colonoscopy had inadequate prep, piecemeal removal of adenomas, or last colonoscopy found > 10 adenomas) ?

## 2017-08-21 VITALS
BODY MASS INDEX: 30.22 KG/M2 | OXYGEN SATURATION: 99 % | WEIGHT: 188 LBS | DIASTOLIC BLOOD PRESSURE: 61 MMHG | TEMPERATURE: 98 F | HEIGHT: 66 IN | SYSTOLIC BLOOD PRESSURE: 113 MMHG | HEART RATE: 72 BPM | RESPIRATION RATE: 13 BRPM

## 2017-08-29 ENCOUNTER — TELEPHONE (OUTPATIENT)
Dept: GASTROENTEROLOGY | Facility: CLINIC | Age: 57
End: 2017-08-29

## 2017-10-16 ENCOUNTER — TELEPHONE (OUTPATIENT)
Dept: FAMILY MEDICINE | Facility: CLINIC | Age: 57
End: 2017-10-16

## 2017-10-16 NOTE — TELEPHONE ENCOUNTER
----- Message from Cary Oniel sent at 10/16/2017  9:43 AM CDT -----  Contact: patient  Patient calling in regards to her having a sinus infection and she also has Laryngitis. She is requesting an antibiotic. Please advise.  Call back   Thanks!  [id number] [place] - [address]  [address] [place] [address]  Phone: [phone] Fax: [phone]

## 2017-10-17 ENCOUNTER — OFFICE VISIT (OUTPATIENT)
Dept: FAMILY MEDICINE | Facility: CLINIC | Age: 57
End: 2017-10-17
Payer: COMMERCIAL

## 2017-10-17 ENCOUNTER — DOCUMENTATION ONLY (OUTPATIENT)
Dept: FAMILY MEDICINE | Facility: CLINIC | Age: 57
End: 2017-10-17

## 2017-10-17 ENCOUNTER — TELEPHONE (OUTPATIENT)
Dept: FAMILY MEDICINE | Facility: CLINIC | Age: 57
End: 2017-10-17

## 2017-10-17 VITALS
DIASTOLIC BLOOD PRESSURE: 75 MMHG | HEART RATE: 102 BPM | BODY MASS INDEX: 30.82 KG/M2 | HEIGHT: 66 IN | TEMPERATURE: 99 F | SYSTOLIC BLOOD PRESSURE: 136 MMHG | WEIGHT: 191.81 LBS

## 2017-10-17 DIAGNOSIS — J32.9 SINUSITIS, UNSPECIFIED CHRONICITY, UNSPECIFIED LOCATION: Primary | ICD-10-CM

## 2017-10-17 PROCEDURE — 99999 PR PBB SHADOW E&M-EST. PATIENT-LVL III: CPT | Mod: PBBFAC,,, | Performed by: PHYSICIAN ASSISTANT

## 2017-10-17 PROCEDURE — 99213 OFFICE O/P EST LOW 20 MIN: CPT | Mod: S$GLB,,, | Performed by: PHYSICIAN ASSISTANT

## 2017-10-17 RX ORDER — AMOXICILLIN AND CLAVULANATE POTASSIUM 875; 125 MG/1; MG/1
1 TABLET, FILM COATED ORAL EVERY 12 HOURS
Qty: 20 TABLET | Refills: 0 | Status: SHIPPED | OUTPATIENT
Start: 2017-10-17 | End: 2017-10-27

## 2017-10-17 NOTE — PROGRESS NOTES
Subjective:       Patient ID: Bing Burch is a 56 y.o. female.    Chief Complaint: Cough (congestion) and Otalgia (right)    Cough   This is a new problem. The current episode started in the past 7 days. The problem has been unchanged. The problem occurs constantly. The cough is productive of sputum. Associated symptoms include ear congestion, ear pain, nasal congestion, postnasal drip, rhinorrhea and a sore throat. Pertinent negatives include no chest pain, chills, eye redness, fever, headaches, shortness of breath or wheezing. The symptoms are aggravated by lying down. She has tried OTC cough suppressant for the symptoms. Her past medical history is significant for environmental allergies.     Review of Systems   Constitutional: Negative for activity change, appetite change, chills, fatigue, fever and unexpected weight change.   HENT: Positive for congestion, ear pain, postnasal drip, rhinorrhea and sore throat. Negative for ear discharge, hearing loss, nosebleeds, trouble swallowing and voice change.    Eyes: Negative for discharge, redness and visual disturbance.   Respiratory: Positive for cough. Negative for chest tightness, shortness of breath and wheezing.    Cardiovascular: Negative for chest pain, palpitations and leg swelling.   Gastrointestinal: Negative.  Negative for blood in stool, constipation, diarrhea and vomiting.   Endocrine: Negative for polydipsia and polyuria.   Genitourinary: Negative for difficulty urinating, dysuria, hematuria and menstrual problem.   Musculoskeletal: Negative for arthralgias, joint swelling and neck pain.   Allergic/Immunologic: Positive for environmental allergies.   Neurological: Negative for weakness and headaches.   Psychiatric/Behavioral: Negative for confusion and dysphoric mood.       Objective:      Physical Exam   Constitutional: She appears well-developed and well-nourished. No distress.   HENT:   Head: Normocephalic and atraumatic.   Right Ear: External  ear normal.   Left Ear: External ear normal.   Mouth/Throat: Uvula is midline, oropharynx is clear and moist and mucous membranes are normal. No oral lesions. No uvula swelling. No oropharyngeal exudate, posterior oropharyngeal edema or posterior oropharyngeal erythema.   Eyes: Conjunctivae and EOM are normal. Pupils are equal, round, and reactive to light.   Neck: Normal range of motion. Neck supple. No thyromegaly present.   Cardiovascular: Normal rate, regular rhythm and normal heart sounds.  Exam reveals no gallop and no friction rub.    No murmur heard.  Pulmonary/Chest: Effort normal and breath sounds normal. No respiratory distress. She has no wheezes. She has no rales.   Abdominal: Soft. Bowel sounds are normal. There is no tenderness.   Skin: She is not diaphoretic.       Assessment:       1. Sinusitis, unspecified chronicity, unspecified location        Plan:       Bing was seen today for cough and otalgia.    Diagnoses and all orders for this visit:    Sinusitis, unspecified chronicity, unspecified location  -     amoxicillin-clavulanate 875-125mg (AUGMENTIN) 875-125 mg per tablet; Take 1 tablet by mouth every 12 (twelve) hours.    Continue dayquil

## 2017-10-17 NOTE — TELEPHONE ENCOUNTER
Pt. Called stated she has this lingering cough with congestion and low grade fevers for about a week. Informed pt. She would need to make an appointment to review symptoms and proper diagnosis. Pt. Scheduled 10/17. Pt. verbalized understanding.

## 2017-10-17 NOTE — TELEPHONE ENCOUNTER
----- Message from Irvin Ro sent at 10/17/2017  3:31 PM CDT -----  Contact: patient  Patient called requesting Rx for sinus infection. Send to [facility]. Returning trevon call.please call back at [phone]. Thanks,

## 2017-10-17 NOTE — PROGRESS NOTES
Pre-Visit Chart Review  For Appointment Scheduled on 10/17/2017      Health Maintenance Due   Topic Date Due    TETANUS VACCINE  11/30/1977    Colonoscopy  11/30/2009    Influenza Vaccine  08/01/2017

## 2018-01-17 ENCOUNTER — HOSPITAL ENCOUNTER (EMERGENCY)
Facility: HOSPITAL | Age: 58
Discharge: HOME | End: 2018-01-17
Attending: EMERGENCY MEDICINE
Payer: OTHER MISCELLANEOUS

## 2018-01-17 VITALS
RESPIRATION RATE: 16 BRPM | DIASTOLIC BLOOD PRESSURE: 78 MMHG | OXYGEN SATURATION: 98 % | TEMPERATURE: 98 F | HEART RATE: 82 BPM | SYSTOLIC BLOOD PRESSURE: 157 MMHG | BODY MASS INDEX: 28.93 KG/M2 | HEIGHT: 66 IN | WEIGHT: 180 LBS

## 2018-01-17 DIAGNOSIS — S52.134A CLOSED NONDISPLACED FRACTURE OF NECK OF RIGHT RADIUS, INITIAL ENCOUNTER: Primary | ICD-10-CM
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
## 2018-01-17 PROCEDURE — 90471 IMMUNIZATION ADMIN: CPT | Performed by: NURSE PRACTITIONER

## 2018-01-17 PROCEDURE — 99283 EMERGENCY DEPT VISIT LOW MDM: CPT | Mod: 25

## 2018-01-17 PROCEDURE — 29105 APPLICATION LONG ARM SPLINT: CPT

## 2018-01-17 PROCEDURE — 29130 APPL FINGER SPLINT STATIC: CPT

## 2018-01-17 PROCEDURE — 25000003 PHARM REV CODE 250: Performed by: NURSE PRACTITIONER

## 2018-01-17 PROCEDURE — 63600175 PHARM REV CODE 636 W HCPCS: Performed by: NURSE PRACTITIONER

## 2018-01-17 PROCEDURE — 90715 TDAP VACCINE 7 YRS/> IM: CPT | Performed by: NURSE PRACTITIONER

## 2018-01-17 RX ORDER — HYDROCODONE BITARTRATE AND ACETAMINOPHEN 5; 325 MG/1; MG/1
1 TABLET ORAL
Status: COMPLETED | OUTPATIENT
Start: 2018-01-17 | End: 2018-01-17

## 2018-01-17 RX ORDER — HYDROCODONE BITARTRATE AND ACETAMINOPHEN 5; 325 MG/1; MG/1
1 TABLET ORAL EVERY 8 HOURS PRN
Qty: 10 TABLET | Refills: 0 | Status: SHIPPED | OUTPATIENT
Start: 2018-01-17 | End: 2018-01-26

## 2018-01-17 RX ADMIN — HYDROCODONE BITARTRATE AND ACETAMINOPHEN 1 TABLET: 5; 325 TABLET ORAL at 08:01

## 2018-01-17 RX ADMIN — CLOSTRIDIUM TETANI TOXOID ANTIGEN (FORMALDEHYDE INACTIVATED), CORYNEBACTERIUM DIPHTHERIAE TOXOID ANTIGEN (FORMALDEHYDE INACTIVATED), BORDETELLA PERTUSSIS TOXOID ANTIGEN (GLUTARALDEHYDE INACTIVATED), BORDETELLA PERTUSSIS FILAMENTOUS HEMAGGLUTININ ANTIGEN (FORMALDEHYDE INACTIVATED), BORDETELLA PERTUSSIS PERTACTIN ANTIGEN, AND BORDETELLA PERTUSSIS FIMBRIAE 2/3 ANTIGEN 0.5 ML: 5; 2; 2.5; 5; 3; 5 INJECTION, SUSPENSION INTRAMUSCULAR at 08:01

## 2018-01-18 NOTE — ED PROVIDER NOTES
Encounter Date: 1/17/2018       History     Chief Complaint   Patient presents with    Fall     slipped on ice, c/o right arm pain. denies head injury or loc. most of pain is to right elbow, pain worse with movement     Patient is a 57 y.o. female who presents to the ED 01/17/2018 with a chief complaint of fall on ice that occurred at approximately noon today; she fell on her right hand and arm; She has pain in her right forearm along radial aspect; she denies hand or wrist pain; she did not hit her head or loose consciousness. She denies vomiting and does not take blood thinners. Only PMH includes GERD.                Review of patient's allergies indicates:  No Known Allergies  Past Medical History:   Diagnosis Date    GERD (gastroesophageal reflux disease)     Irritable bowel syndrome     mixed type, with slightly predominant constipation     Past Surgical History:   Procedure Laterality Date    HERNIA REPAIR      hiatal hernia repair in 2013      SINUS SURGERY      TONSILLECTOMY      TUMOR EXCISION       Family History   Problem Relation Age of Onset    Psoriasis Brother     No Known Problems Mother     Eczema Neg Hx     Lupus Neg Hx     Melanoma Neg Hx      Social History   Substance Use Topics    Smoking status: Former Smoker     Quit date: 9/13/1989    Smokeless tobacco: Never Used    Alcohol use Yes      Comment: occasionally     Review of Systems   Constitutional: Negative for chills and fever.   Eyes: Negative for visual disturbance.   Respiratory: Negative for chest tightness and shortness of breath.    Cardiovascular: Negative for chest pain.   Gastrointestinal: Negative for abdominal pain and vomiting.   Musculoskeletal: Positive for arthralgias (right forearm). Negative for back pain, gait problem, myalgias, neck pain and neck stiffness.   Skin: Negative for wound.   Neurological: Negative for syncope, weakness and numbness.       Physical Exam     Initial Vitals [01/17/18 1714]   BP  Pulse Resp Temp SpO2   (!) 157/78 82 16 97.8 °F (36.6 °C) 98 %      MAP       104.33         Physical Exam    Nursing note and vitals reviewed.  Constitutional: Vital signs are normal. She appears well-developed and well-nourished.   HENT:   Head: Normocephalic and atraumatic.   Eyes: Pupils are equal, round, and reactive to light.   Neck: Neck supple.   Cardiovascular: Normal rate, regular rhythm, normal heart sounds and intact distal pulses.   No murmur heard.  Pulmonary/Chest: Breath sounds normal.   Abdominal: Soft. Normal appearance and bowel sounds are normal.   Musculoskeletal: She exhibits tenderness.        Right shoulder: Normal.        Right elbow: Normal.       Right wrist: Normal.        Right forearm: She exhibits tenderness and bony tenderness. She exhibits no swelling, no edema, no deformity and no laceration.        Arms:       Right hand: She exhibits tenderness. She exhibits normal range of motion, no bony tenderness, normal two-point discrimination, normal capillary refill, no deformity, no laceration and no swelling. Normal sensation noted. Normal strength noted.        Hands:  Neurological: She is alert and oriented to person, place, and time. She has normal strength.   Skin: Skin is warm, dry and intact.   Psychiatric: She has a normal mood and affect. Her speech is normal and behavior is normal.         ED Course   Procedures  Labs Reviewed - No data to display          Medical Decision Making:   Differential Diagnosis:   Fracture  Contusion  abrasion       APC / Resident Notes:   Patient is a 57 y.o. female who presents to the ED 01/17/2018 who underwent emergent evaluation for fall. Patient did not hit her head or loose consciousness; patient fell onto right arm. Patient has normal ROM in right shoulder, elbow and wrist; she has + 2 radial pulses without swelling or deformity of RUE; no signs of neurovascular compromise; she has tenderness over right radius proximal to elbow and right  thenar tenderness; right forearm with radial fx per xray - see report; right hand xray without acute findings but suspicion for occult fx due to snuff box tenderness; patient will be placed in right long arm splint with sling and thumb spica; tetanus updated;  pt instructed NOT to remove splint until follow up with orthopedic; patient verbalized understanding; patient given Norco for pain. Return precautions discussed. Patient verbalized understanding.                    ED Course      Clinical Impression:   Radius fracture  Disposition:   Disposition: Discharged  Condition: Stable                        Aliyah Merritt NP  01/17/18 2108       Aliyah Merritt NP  01/17/18 2108       Aliyah Merritt NP  01/17/18 2109

## 2018-01-18 NOTE — ED NOTES
Pt presents with pain post-fall on ice to right elbow, wrist, and hand. Limited ROM. Pain 7/10. Denies any other issues.

## 2018-01-19 ENCOUNTER — OFFICE VISIT (OUTPATIENT)
Dept: ORTHOPEDICS | Facility: CLINIC | Age: 58
End: 2018-01-19
Payer: OTHER MISCELLANEOUS

## 2018-01-19 VITALS
BODY MASS INDEX: 28.93 KG/M2 | HEIGHT: 66 IN | SYSTOLIC BLOOD PRESSURE: 132 MMHG | HEART RATE: 88 BPM | WEIGHT: 180 LBS | DIASTOLIC BLOOD PRESSURE: 79 MMHG

## 2018-01-19 DIAGNOSIS — S52.134A NONDISPLACED FRACTURE OF NECK OF RIGHT RADIUS, INITIAL ENCOUNTER FOR CLOSED FRACTURE: Primary | ICD-10-CM

## 2018-01-19 PROCEDURE — 99999 PR PBB SHADOW E&M-EST. PATIENT-LVL III: CPT | Mod: PBBFAC,,, | Performed by: ORTHOPAEDIC SURGERY

## 2018-01-19 PROCEDURE — 99203 OFFICE O/P NEW LOW 30 MIN: CPT | Mod: S$GLB,,, | Performed by: ORTHOPAEDIC SURGERY

## 2018-01-24 NOTE — PROGRESS NOTES
Past Medical History:   Diagnosis Date    GERD (gastroesophageal reflux disease)     Irritable bowel syndrome     mixed type, with slightly predominant constipation       Past Surgical History:   Procedure Laterality Date    HERNIA REPAIR      hiatal hernia repair in 2013      SINUS SURGERY      TONSILLECTOMY      TUMOR EXCISION         Current Outpatient Prescriptions   Medication Sig    hydrocodone-acetaminophen 5-325mg (NORCO) 5-325 mg per tablet Take 1 tablet by mouth every 8 (eight) hours as needed for Pain.    esomeprazole (NEXIUM) 40 MG capsule Take 1 capsule (40 mg total) by mouth once daily.     No current facility-administered medications for this visit.        Review of patient's allergies indicates:  No Known Allergies    Family History   Problem Relation Age of Onset    Psoriasis Brother     No Known Problems Mother     Eczema Neg Hx     Lupus Neg Hx     Melanoma Neg Hx        Social History     Social History    Marital status:      Spouse name: N/A    Number of children: N/A    Years of education: N/A     Occupational History    Not on file.     Social History Main Topics    Smoking status: Former Smoker     Quit date: 9/13/1989    Smokeless tobacco: Never Used    Alcohol use Yes      Comment: occasionally    Drug use: No    Sexual activity: Yes     Other Topics Concern    Not on file     Social History Narrative    No narrative on file       Chief Complaint:   Chief Complaint   Patient presents with    Elbow Pain     right radial neck fracture       Consulting Physician: Emergency, Dept [facility]*    History of present illness:    This is a 57 y.o. female who complains of right elbow pain following a fall 1-17-18. Pain 4/10 and worse with use.    Review of Systems:    Constitution: Denies chills, fever, and sweats.  HENT: Denies headaches or blurry vision.  Cardiovascular: Denies chest pain or irregular heart beat.  Respiratory: Denies cough or shortness of  "breath.  Gastrointestinal: Denies abdominal pain, nausea, or vomiting.  Musculoskeletal:  Denies muscle cramps.  Neurological: Denies dizziness or focal weakness.  Psychiatric/Behavioral: Normal mental status.  Hematologic/Lymphatic: Denies bleeding problem or easy bruising/bleeding.  Skin: Denies rash or suspicious lesions.    Examination:    Vital Signs:    Vitals:    01/19/18 0918   BP: 132/79   Pulse: 88   Weight: 81.6 kg (180 lb)   Height: 5' 6" (1.676 m)   PainSc:   4   PainLoc: Elbow       Body mass index is 29.05 kg/m².    This a well-developed, well nourished patient in no acute distress.    Alert and oriented x 3 and cooperative to examination.       Physical Exam: Right Elbow Exam    Skin  Scars:   None  Rash:   None    Inspection  Erythema:  None  Bruising:  None  Swelling:  mild  Masses:  None  Lymphadenopathy: None    Range of Motion  Flexion:  160°  Extension:  0°  Supination:  80°  Pronation:  80°    Tenderness  Medial Epicondyle: None  Lateral Epicondyle: None  Olecranon:  None  Radial head  Yes    Stability  Valgus Stress:  Not tested  Varus Stress:  Not tested    Strength:  Normal  Sensation:  Intact  Cubital Tinel's:     Negative    Vascular  Pulses:  Palpable  Capillary Refill: Normal          Imaging: X-rays ordered and reviewed today personally of the right elbow show a non-displaced radial neck fracture.        Assessment: Nondisplaced fracture of neck of right radius, initial encounter for closed fracture        Plan:  We will place her in a brace and check XR next week. Able to work with no use of right arm.      DISCLAIMER: This note may have been dictated using voice recognition software and may contain grammatical errors.     NOTE: Consult report sent to referring provider via EP[facility] EMR.  "

## 2018-01-25 DIAGNOSIS — M79.601 RIGHT ARM PAIN: Primary | ICD-10-CM

## 2018-01-26 ENCOUNTER — HOSPITAL ENCOUNTER (OUTPATIENT)
Dept: RADIOLOGY | Facility: HOSPITAL | Age: 58
Discharge: HOME | End: 2018-01-26
Attending: ORTHOPAEDIC SURGERY
Payer: COMMERCIAL

## 2018-01-26 ENCOUNTER — OFFICE VISIT (OUTPATIENT)
Dept: OBSTETRICS AND GYNECOLOGY | Facility: CLINIC | Age: 58
End: 2018-01-26
Payer: COMMERCIAL

## 2018-01-26 ENCOUNTER — OFFICE VISIT (OUTPATIENT)
Dept: ORTHOPEDICS | Facility: CLINIC | Age: 58
End: 2018-01-26
Payer: OTHER MISCELLANEOUS

## 2018-01-26 VITALS
DIASTOLIC BLOOD PRESSURE: 79 MMHG | SYSTOLIC BLOOD PRESSURE: 128 MMHG | BODY MASS INDEX: 31.46 KG/M2 | WEIGHT: 195.75 LBS | HEIGHT: 66 IN | HEART RATE: 87 BPM

## 2018-01-26 VITALS
BODY MASS INDEX: 31.46 KG/M2 | WEIGHT: 195.75 LBS | DIASTOLIC BLOOD PRESSURE: 63 MMHG | HEART RATE: 80 BPM | SYSTOLIC BLOOD PRESSURE: 140 MMHG | HEIGHT: 66 IN

## 2018-01-26 DIAGNOSIS — Z01.419 ENCOUNTER FOR WELL WOMAN EXAM: Primary | ICD-10-CM

## 2018-01-26 DIAGNOSIS — M79.601 RIGHT ARM PAIN: ICD-10-CM

## 2018-01-26 DIAGNOSIS — Z12.31 SCREENING MAMMOGRAM, ENCOUNTER FOR: ICD-10-CM

## 2018-01-26 DIAGNOSIS — S52.134A NONDISPLACED FRACTURE OF NECK OF RIGHT RADIUS, INITIAL ENCOUNTER FOR CLOSED FRACTURE: Primary | ICD-10-CM

## 2018-01-26 PROCEDURE — 73090 X-RAY EXAM OF FOREARM: CPT | Mod: 26,RT,, | Performed by: RADIOLOGY

## 2018-01-26 PROCEDURE — 88175 CYTOPATH C/V AUTO FLUID REDO: CPT

## 2018-01-26 PROCEDURE — 99213 OFFICE O/P EST LOW 20 MIN: CPT | Mod: S$GLB,,, | Performed by: ORTHOPAEDIC SURGERY

## 2018-01-26 PROCEDURE — 73090 X-RAY EXAM OF FOREARM: CPT | Mod: TC,PN,RT

## 2018-01-26 PROCEDURE — 99999 PR PBB SHADOW E&M-EST. PATIENT-LVL III: CPT | Mod: PBBFAC,,, | Performed by: ORTHOPAEDIC SURGERY

## 2018-01-26 PROCEDURE — 99386 PREV VISIT NEW AGE 40-64: CPT | Mod: S$GLB,,, | Performed by: NURSE PRACTITIONER

## 2018-01-26 PROCEDURE — 3008F BODY MASS INDEX DOCD: CPT | Mod: S$GLB,,, | Performed by: ORTHOPAEDIC SURGERY

## 2018-01-26 PROCEDURE — 99999 PR PBB SHADOW E&M-EST. PATIENT-LVL III: CPT | Mod: PBBFAC,,, | Performed by: NURSE PRACTITIONER

## 2018-01-26 NOTE — PROGRESS NOTES
Chief Complaint   Patient presents with    Well Woman       History of Present Illness: Bing Burch is a 57 y.o. female that presents today 2018 for well gyn visit.  Pt here for well woman exam. Pt denies ever having any abnormal pap smears in the past. She states that she stopped having cycles at about 46 yo. She was on hormone replacement patches until about 3 years ago. She denies having any menopausal symptoms at this time. She is due for a mammogram. No other complaints or concerns noted.      Past Medical History:   Diagnosis Date    GERD (gastroesophageal reflux disease)     Irritable bowel syndrome     mixed type, with slightly predominant constipation       Past Surgical History:   Procedure Laterality Date    HERNIA REPAIR      hiatal hernia repair in 2013      SINUS SURGERY      TONSILLECTOMY      TUMOR EXCISION         Family History   Problem Relation Age of Onset    Psoriasis Brother     No Known Problems Mother     Eczema Neg Hx     Lupus Neg Hx     Melanoma Neg Hx        Social History     Social History    Marital status:      Spouse name: N/A    Number of children: N/A    Years of education: N/A     Social History Main Topics    Smoking status: Former Smoker     Quit date: 1989    Smokeless tobacco: Never Used    Alcohol use Yes      Comment: occasionally    Drug use: No    Sexual activity: Not Currently     Other Topics Concern    None     Social History Narrative    None       OB History    Para Term  AB Living   1 1 1 0 0 1   SAB TAB Ectopic Multiple Live Births   0 0 0 0 1      # Outcome Date GA Lbr Gabino/2nd Weight Sex Delivery Anes PTL Lv   1 Term      Vag-Spont   MUNA          Current Outpatient Prescriptions   Medication Sig Dispense Refill    esomeprazole (NEXIUM) 40 MG capsule Take 1 capsule (40 mg total) by mouth once daily. 30 capsule 1     No current facility-administered medications for this visit.        Review of patient's  "allergies indicates:  No Known Allergies    Review of Symptoms:  GENERAL: Denies weight gain or weight loss. Feeling well overall.   SKIN: Denies rash or lesions.   ABDOMEN: No abdominal pain, constipation, diarrhea, nausea, vomiting or rectal bleeding.   URINARY: No frequency, dysuria, hematuria, or burning on urination.    /79   Pulse 87   Ht 5' 6" (1.676 m)   Wt 88.8 kg (195 lb 12.3 oz)     Physical Exam:  APPEARANCE: Well nourished, well developed, in no acute distress.  SKIN: Normal skin turgor, no lesions.  RESPIRATORY: Normal respiratory effort with no retractions or use of accessory muscles  ABDOMEN: Soft. No tenderness or masses. No hepatosplenomegaly. No hernias.  BREASTS: Symmetrical, no skin changes or visible lesions. No palpable masses, nipple discharge or adenopathy bilaterally.  PELVIC: Normal external female genitalia without lesions. Normal hair distribution. Adequate perineal body, normal urethral meatus. Urethra with no masses.  Bladder nontender. Vagina moist and well rugated without lesions or discharge. Cervix pink and without lesions. No significant cystocele or rectocele. Bimanual exam showed uterus normal size, shape, position, mobile and nontender. Adnexa without masses or tenderness. Urethra and bladder normal. PAP DONE    ASSESSMENT/PLAN:  Encounter for well woman exam  -     Mammo Digital Screening Bilat With CAD; Future; Expected date: 01/26/2018  -     Liquid-based pap smear, screening    Screening mammogram, encounter for  -     Mammo Digital Screening Bilat With CAD; Future; Expected date: 01/26/2018          Patient was counseled today on Pap guidelines, recommendation for pelvic exams, mammograms starting annually at age 40, Colonoscopy after the age of 50, Dexa Bone Scan and calcium and vitamin D supplementation in menopause and to see her PCP for other health maintenance.       Follow-up:  RTC in 1 year for well woman exam or as needed  "

## 2018-01-31 ENCOUNTER — HOSPITAL ENCOUNTER (OUTPATIENT)
Dept: RADIOLOGY | Facility: CLINIC | Age: 58
Discharge: HOME | End: 2018-01-31
Attending: NURSE PRACTITIONER
Payer: COMMERCIAL

## 2018-01-31 DIAGNOSIS — Z12.31 SCREENING MAMMOGRAM, ENCOUNTER FOR: ICD-10-CM

## 2018-01-31 DIAGNOSIS — Z01.419 ENCOUNTER FOR WELL WOMAN EXAM: ICD-10-CM

## 2018-01-31 PROCEDURE — 77067 SCR MAMMO BI INCL CAD: CPT | Mod: TC,PO

## 2018-01-31 PROCEDURE — 77067 SCR MAMMO BI INCL CAD: CPT | Mod: 26,,, | Performed by: RADIOLOGY

## 2018-01-31 PROCEDURE — 77063 BREAST TOMOSYNTHESIS BI: CPT | Mod: 26,,, | Performed by: RADIOLOGY

## 2018-02-01 NOTE — PROGRESS NOTES
Past Medical History:   Diagnosis Date    GERD (gastroesophageal reflux disease)     Irritable bowel syndrome     mixed type, with slightly predominant constipation       Past Surgical History:   Procedure Laterality Date    HERNIA REPAIR      hiatal hernia repair in 2013      SINUS SURGERY      TONSILLECTOMY      TUMOR EXCISION         Current Outpatient Prescriptions   Medication Sig    esomeprazole (NEXIUM) 40 MG capsule Take 1 capsule (40 mg total) by mouth once daily.     No current facility-administered medications for this visit.        Review of patient's allergies indicates:  No Known Allergies    Family History   Problem Relation Age of Onset    Psoriasis Brother     Breast cancer Mother 55    Eczema Neg Hx     Lupus Neg Hx     Melanoma Neg Hx        Social History     Social History    Marital status:      Spouse name: N/A    Number of children: N/A    Years of education: N/A     Occupational History    Not on file.     Social History Main Topics    Smoking status: Former Smoker     Quit date: 9/13/1989    Smokeless tobacco: Never Used    Alcohol use Yes      Comment: occasionally    Drug use: No    Sexual activity: Not Currently     Other Topics Concern    Not on file     Social History Narrative    No narrative on file       Chief Complaint:   Chief Complaint   Patient presents with    Right Elbow - Injury       Consulting Physician: No ref. provider found    History of present illness:    This is a 57 y.o. female who complains of right elbow pain following a fall 1-17-18. Pain 1/10 and worse with use.    Review of Systems:    Constitution: Denies chills, fever, and sweats.  HENT: Denies headaches or blurry vision.  Cardiovascular: Denies chest pain or irregular heart beat.  Respiratory: Denies cough or shortness of breath.  Gastrointestinal: Denies abdominal pain, nausea, or vomiting.  Musculoskeletal:  Denies muscle cramps.  Neurological: Denies dizziness or focal  "weakness.  Psychiatric/Behavioral: Normal mental status.  Hematologic/Lymphatic: Denies bleeding problem or easy bruising/bleeding.  Skin: Denies rash or suspicious lesions.    Examination:    Vital Signs:    Vitals:    01/26/18 0849   BP: (!) 140/63   Pulse: 80   Weight: 88.8 kg (195 lb 12.3 oz)   Height: 5' 6" (1.676 m)   PainSc:   5   PainLoc: Elbow       Body mass index is 31.6 kg/m².    This a well-developed, well nourished patient in no acute distress.    Alert and oriented x 3 and cooperative to examination.       Physical Exam: Right Elbow Exam    Skin  Scars:   None  Rash:   None    Inspection  Erythema:  None  Bruising:  None  Swelling:  mild  Masses:  None  Lymphadenopathy: None    Range of Motion  Flexion:  130  Extension:  20°  Supination:  80°  Pronation:  80°    Tenderness  Medial Epicondyle: None  Lateral Epicondyle: None  Olecranon:  None  Radial head  Yes    Stability  Valgus Stress:  Not tested  Varus Stress:  Not tested    Strength:  Normal  Sensation:  Intact  Cubital Tinel's:     Negative    Vascular  Pulses:  Palpable  Capillary Refill: Normal          Imaging: X-rays ordered and reviewed today personally of the right elbow show a non-displaced radial neck fracture.        Assessment: Nondisplaced fracture of neck of right radius, initial encounter for closed fracture        Plan:  Begin gentle ROM. Able to work with no use of right arm. Back in 3 weeks with XR.      DISCLAIMER: This note may have been dictated using voice recognition software and may contain grammatical errors.     NOTE: Consult report sent to referring provider via EP[facility] EMR.  "

## 2018-02-02 ENCOUNTER — TELEPHONE (OUTPATIENT)
Dept: OBSTETRICS AND GYNECOLOGY | Facility: CLINIC | Age: 58
End: 2018-02-02

## 2018-02-02 NOTE — TELEPHONE ENCOUNTER
Please call and inform pt that her mammogram screening came back normal. However, there is a lifetime breast cancer risk assessment tool that is calculated and if the scoring is greater than 20% the pt may benefit from supplemental testing.     Pts score was 22%. The suggested supplemental testing is a breast MRI.     If the pt does not choose to have this done, then it is recommended that she continue having yearly mammograms.     Thanks

## 2018-02-22 DIAGNOSIS — M25.521 RIGHT ELBOW PAIN: Primary | ICD-10-CM

## 2018-02-27 ENCOUNTER — HOSPITAL ENCOUNTER (OUTPATIENT)
Dept: RADIOLOGY | Facility: HOSPITAL | Age: 58
Discharge: HOME | End: 2018-02-27
Attending: ORTHOPAEDIC SURGERY
Payer: OTHER MISCELLANEOUS

## 2018-02-27 ENCOUNTER — OFFICE VISIT (OUTPATIENT)
Dept: ORTHOPEDICS | Facility: CLINIC | Age: 58
End: 2018-02-27
Payer: OTHER MISCELLANEOUS

## 2018-02-27 VITALS
SYSTOLIC BLOOD PRESSURE: 134 MMHG | HEART RATE: 87 BPM | HEIGHT: 66 IN | DIASTOLIC BLOOD PRESSURE: 74 MMHG | WEIGHT: 195.75 LBS | BODY MASS INDEX: 31.46 KG/M2

## 2018-02-27 DIAGNOSIS — S52.134A NONDISPLACED FRACTURE OF NECK OF RIGHT RADIUS, INITIAL ENCOUNTER FOR CLOSED FRACTURE: Primary | ICD-10-CM

## 2018-02-27 DIAGNOSIS — M25.521 RIGHT ELBOW PAIN: ICD-10-CM

## 2018-02-27 PROCEDURE — 73090 X-RAY EXAM OF FOREARM: CPT | Mod: 26,RT,, | Performed by: RADIOLOGY

## 2018-02-27 PROCEDURE — 99213 OFFICE O/P EST LOW 20 MIN: CPT | Mod: S$GLB,,, | Performed by: ORTHOPAEDIC SURGERY

## 2018-02-27 PROCEDURE — 99999 PR PBB SHADOW E&M-EST. PATIENT-LVL III: CPT | Mod: PBBFAC,,, | Performed by: ORTHOPAEDIC SURGERY

## 2018-02-27 PROCEDURE — 73090 X-RAY EXAM OF FOREARM: CPT | Mod: TC,PN,RT

## 2018-03-02 NOTE — PROGRESS NOTES
Past Medical History:   Diagnosis Date    GERD (gastroesophageal reflux disease)     Irritable bowel syndrome     mixed type, with slightly predominant constipation       Past Surgical History:   Procedure Laterality Date    HERNIA REPAIR      hiatal hernia repair in 2013      SINUS SURGERY      TONSILLECTOMY      TUMOR EXCISION         Current Outpatient Prescriptions   Medication Sig    esomeprazole (NEXIUM) 40 MG capsule Take 1 capsule (40 mg total) by mouth once daily.     No current facility-administered medications for this visit.        Review of patient's allergies indicates:  No Known Allergies    Family History   Problem Relation Age of Onset    Psoriasis Brother     Breast cancer Mother 55    Eczema Neg Hx     Lupus Neg Hx     Melanoma Neg Hx        Social History     Social History    Marital status:      Spouse name: N/A    Number of children: N/A    Years of education: N/A     Occupational History    Not on file.     Social History Main Topics    Smoking status: Former Smoker     Quit date: 9/13/1989    Smokeless tobacco: Never Used    Alcohol use Yes      Comment: occasionally    Drug use: No    Sexual activity: Not Currently     Other Topics Concern    Not on file     Social History Narrative    No narrative on file       Chief Complaint:   Chief Complaint   Patient presents with    Right Elbow - Injury       Consulting Physician: No ref. provider found    History of present illness:    This is a 57 y.o. female who complains of right elbow pain following a fall 1-17-18. Pain 2/10 and worse with use.    Review of Systems:    Constitution: Denies chills, fever, and sweats.  HENT: Denies headaches or blurry vision.  Cardiovascular: Denies chest pain or irregular heart beat.  Respiratory: Denies cough or shortness of breath.  Gastrointestinal: Denies abdominal pain, nausea, or vomiting.  Musculoskeletal:  Denies muscle cramps.  Neurological: Denies dizziness or focal  "weakness.  Psychiatric/Behavioral: Normal mental status.  Hematologic/Lymphatic: Denies bleeding problem or easy bruising/bleeding.  Skin: Denies rash or suspicious lesions.    Examination:    Vital Signs:    Vitals:    02/27/18 0904   BP: 134/74   Pulse: 87   Weight: 88.8 kg (195 lb 12.3 oz)   Height: 5' 6" (1.676 m)   PainSc:   2   PainLoc: Elbow       Body mass index is 31.6 kg/m².    This a well-developed, well nourished patient in no acute distress.    Alert and oriented x 3 and cooperative to examination.       Physical Exam: Right Elbow Exam    Skin  Scars:   None  Rash:   None    Inspection  Erythema:  None  Bruising:  None  Swelling:  mild  Masses:  None  Lymphadenopathy: None    Range of Motion  Flexion:  140  Extension:  10°  Supination:  80°  Pronation:  80°    Tenderness  Medial Epicondyle: None  Lateral Epicondyle: None  Olecranon:  None  Radial head  mild    Stability  Valgus Stress:  Not tested  Varus Stress:  Not tested    Strength:  Normal  Sensation:  Intact  Cubital Tinel's:     Negative    Vascular  Pulses:  Palpable  Capillary Refill: Normal          Imaging: X-rays ordered and reviewed today personally of the right elbow show a non-displaced radial neck fracture with healing       Assessment: Nondisplaced fracture of neck of right radius, initial encounter for closed fracture        Plan:  Continue ROM HEP. Able to work with no use of right arm. Back in 6 weeks with XR      DISCLAIMER: This note may have been dictated using voice recognition software and may contain grammatical errors.     NOTE: Consult report sent to referring provider via EP[facility] EMR.  "

## 2018-03-26 DIAGNOSIS — Z12.11 COLON CANCER SCREENING: Primary | ICD-10-CM

## 2018-03-28 ENCOUNTER — TELEPHONE (OUTPATIENT)
Dept: ORTHOPEDICS | Facility: CLINIC | Age: 58
End: 2018-03-28

## 2018-03-28 ENCOUNTER — PATIENT MESSAGE (OUTPATIENT)
Dept: ORTHOPEDICS | Facility: CLINIC | Age: 58
End: 2018-03-28

## 2018-03-29 ENCOUNTER — OFFICE VISIT (OUTPATIENT)
Dept: FAMILY MEDICINE | Facility: CLINIC | Age: 58
End: 2018-03-29
Payer: COMMERCIAL

## 2018-03-29 ENCOUNTER — LAB VISIT (OUTPATIENT)
Dept: LAB | Facility: HOSPITAL | Age: 58
End: 2018-03-29
Attending: FAMILY MEDICINE
Payer: COMMERCIAL

## 2018-03-29 VITALS
WEIGHT: 194.44 LBS | DIASTOLIC BLOOD PRESSURE: 84 MMHG | HEIGHT: 66 IN | HEART RATE: 83 BPM | BODY MASS INDEX: 31.25 KG/M2 | SYSTOLIC BLOOD PRESSURE: 126 MMHG

## 2018-03-29 DIAGNOSIS — K21.9 GASTROESOPHAGEAL REFLUX DISEASE WITHOUT ESOPHAGITIS: ICD-10-CM

## 2018-03-29 DIAGNOSIS — Z00.00 WELLNESS EXAMINATION: Primary | ICD-10-CM

## 2018-03-29 DIAGNOSIS — R45.83 CRYING FOR UNKNOWN REASON: ICD-10-CM

## 2018-03-29 DIAGNOSIS — J30.9 ALLERGIC RHINITIS, UNSPECIFIED CHRONICITY, UNSPECIFIED SEASONALITY, UNSPECIFIED TRIGGER: ICD-10-CM

## 2018-03-29 DIAGNOSIS — Z00.00 WELLNESS EXAMINATION: ICD-10-CM

## 2018-03-29 LAB
CREAT UR-MCNC: 75 MG/DL
MICROALBUMIN UR DL<=1MG/L-MCNC: 3 UG/ML
MICROALBUMIN/CREATININE RATIO: 4 UG/MG

## 2018-03-29 PROCEDURE — 99999 PR PBB SHADOW E&M-EST. PATIENT-LVL III: CPT | Mod: PBBFAC,,, | Performed by: FAMILY MEDICINE

## 2018-03-29 PROCEDURE — 82043 UR ALBUMIN QUANTITATIVE: CPT

## 2018-03-29 PROCEDURE — 99396 PREV VISIT EST AGE 40-64: CPT | Mod: 25,S$GLB,, | Performed by: FAMILY MEDICINE

## 2018-03-29 PROCEDURE — 96372 THER/PROPH/DIAG INJ SC/IM: CPT | Mod: S$GLB,,, | Performed by: FAMILY MEDICINE

## 2018-03-29 RX ORDER — ESOMEPRAZOLE MAGNESIUM 40 MG/1
40 CAPSULE, DELAYED RELEASE ORAL
Qty: 90 CAPSULE | Refills: 1 | Status: SHIPPED | OUTPATIENT
Start: 2018-03-29 | End: 2018-06-06

## 2018-03-29 RX ORDER — CETIRIZINE HYDROCHLORIDE, PSEUDOEPHEDRINE HYDROCHLORIDE 5; 120 MG/1; MG/1
1 TABLET, FILM COATED, EXTENDED RELEASE ORAL 2 TIMES DAILY
Qty: 30 TABLET | Refills: 0 | Status: SHIPPED | OUTPATIENT
Start: 2018-03-29 | End: 2018-04-08

## 2018-03-29 RX ORDER — DEXAMETHASONE SODIUM PHOSPHATE 4 MG/ML
8 INJECTION, SOLUTION INTRA-ARTICULAR; INTRALESIONAL; INTRAMUSCULAR; INTRAVENOUS; SOFT TISSUE
Status: COMPLETED | OUTPATIENT
Start: 2018-03-29 | End: 2018-03-29

## 2018-03-29 RX ORDER — ESOMEPRAZOLE MAGNESIUM 40 MG/1
40 CAPSULE, DELAYED RELEASE ORAL
COMMUNITY
End: 2018-03-29 | Stop reason: SDUPTHER

## 2018-03-29 RX ADMIN — DEXAMETHASONE SODIUM PHOSPHATE 8 MG: 4 INJECTION, SOLUTION INTRA-ARTICULAR; INTRALESIONAL; INTRAMUSCULAR; INTRAVENOUS; SOFT TISSUE at 09:03

## 2018-03-29 NOTE — PROGRESS NOTES
Subjective:       Patient ID: Bing Burch is a 57 y.o. female.    Chief Complaint: Annual Exam (Post nasal drip)    HPI     Annual Exam  Pt reports to the clinic for a wellness exam.   Currently, pt is without complaint.   The pt has a medical history which includes GERD and obesity.  As far as smoking is concerned, the pt denies.   The pt attempts to maintain a healthy diet and engages in regular exercise.   Consistent seatbelt usage reported.     Currently, pt is with post nasal drainage and sinus related issues.   No fever nor chills.     Pt does report crying spells which she does not feel depressed.   Pt went through menopause at approx 51 years old.   She has a good outlook on life and does not feel that her job is too stressful.   She does not sleep well, however.     Review of Systems   Constitutional: Negative for chills and fever.   HENT: Positive for congestion, postnasal drip, rhinorrhea and sinus pressure. Negative for sore throat.    Eyes: Negative for visual disturbance.   Respiratory: Negative for cough and shortness of breath.    Cardiovascular: Negative for chest pain and leg swelling.   Gastrointestinal: Negative for abdominal pain, blood in stool, constipation, diarrhea and vomiting.   Genitourinary: Negative for difficulty urinating, dysuria and hematuria.   Musculoskeletal: Negative for arthralgias and back pain.   Neurological: Positive for headaches. Negative for dizziness and weakness.   Psychiatric/Behavioral: Negative for self-injury. The patient is not nervous/anxious.        Objective:      Physical Exam   Constitutional: She appears well-developed and well-nourished. No distress.   Obese female in no acute distress.   HENT:   Head: Normocephalic and atraumatic.   Mouth/Throat: No oropharyngeal exudate.   Mild erythema to posterior oropharynx.  No exudates.  Bilateral nasal turbinates with mild edema/erythema.     Eyes: EOM are normal. Pupils are equal, round, and reactive to  light.   Neck: Normal range of motion. Neck supple. No thyromegaly present.   Cardiovascular: Normal rate, regular rhythm and intact distal pulses.    Pulmonary/Chest: Effort normal. No respiratory distress. She has no wheezes.   Abdominal: Soft. She exhibits no distension and no mass. There is no tenderness.   Musculoskeletal: She exhibits no edema.   Lymphadenopathy:     She has no cervical adenopathy.   Neurological: She is alert.   Skin: Skin is warm. No rash noted. No erythema.   Psychiatric: She has a normal mood and affect. Her behavior is normal.   Vitals reviewed.      Assessment:       1. Wellness examination    2. Allergic rhinitis, unspecified chronicity, unspecified seasonality, unspecified trigger    3. Gastroesophageal reflux disease without esophagitis    4. Crying for unknown reason        Plan:       1. Wellness examination  Patient has been advised to continue to maintain a healthy lifestyle, including regular exercise and consuming a well balanced diet.   - CBC auto differential; Future  - Lipid panel; Future  - Microalbumin/creatinine urine ratio; Future  - TSH; Future  - Comprehensive metabolic panel; Future  - Vitamin D; Future  - Hemoglobin A1c; Future    2. Allergic rhinitis, unspecified chronicity, unspecified seasonality, unspecified trigger  - dexamethasone injection 8 mg; Inject 2 mLs (8 mg total) into the muscle one time.  - cetirizine-pseudoephedrine 5-120 mg Tb12; Take 1 tablet by mouth 2 (two) times daily.  Dispense: 30 tablet; Refill: 0    3. Gastroesophageal reflux disease without esophagitis  Condition currently stable. No changes to medication regimen on today.   - esomeprazole (NEXIUM) 40 MG capsule; Take 1 capsule (40 mg total) by mouth before breakfast.  Dispense: 90 capsule; Refill: 1    4. Crying for unknown reason  F/u in 1 month for follow up.   Advised to monitor when she has a crying spell and write down her emotions as well as foods that she has recently consumed (as pt  thinks that this is related) and possible stressors or life events that may trigger her symptoms.   - TSH; Future  - Ambulatory referral to Psychiatry  - Hemoglobin A1c; Future    Patient readiness: acceptance and barriers:none    During the course of the visit the patient was educated and counseled about the following:     Obesity:   Informal exercise measures discussed, e.g. taking stairs instead of elevator.  Regular aerobic exercise program discussed.    Goals: Obesity: Reduce calorie intake and BMI    Did patient meet goals/outcomes: No    The following self management tools provided: excercise log    Patient Instructions (the written plan) was given to the patient/family.     Time spent with patient: 15 minutes    Barriers to medications present (no )    Adverse reactions to current medications (no)    Over the counter medications reviewed (Yes)    Portions of this note were created using Dragon voice recognition software. There may be voice recognition errors found in the text, and attempts were made to correct these errors prior to signature    Bill Friedman MD    Family Medicine  3/29/2018

## 2018-03-29 NOTE — PROGRESS NOTES
Pt. Identified using  and name.Procedure was explained, pt. Verbalized understanding. Dexamethasone administered IM in the left upper outer gluteus  using sterile technique, no bleeding noted at injection site.  Pt. tolerated procedure well.

## 2018-04-01 ENCOUNTER — PATIENT MESSAGE (OUTPATIENT)
Dept: FAMILY MEDICINE | Facility: CLINIC | Age: 58
End: 2018-04-01

## 2018-04-02 ENCOUNTER — OFFICE VISIT (OUTPATIENT)
Dept: PRIMARY CARE CLINIC | Facility: CLINIC | Age: 58
End: 2018-04-02
Payer: COMMERCIAL

## 2018-04-02 VITALS
HEIGHT: 66 IN | TEMPERATURE: 98 F | RESPIRATION RATE: 16 BRPM | HEART RATE: 99 BPM | DIASTOLIC BLOOD PRESSURE: 65 MMHG | BODY MASS INDEX: 31.34 KG/M2 | WEIGHT: 195 LBS | SYSTOLIC BLOOD PRESSURE: 131 MMHG | OXYGEN SATURATION: 99 %

## 2018-04-02 DIAGNOSIS — R50.9 FEVER AND CHILLS: ICD-10-CM

## 2018-04-02 DIAGNOSIS — J01.00 ACUTE NON-RECURRENT MAXILLARY SINUSITIS: Primary | ICD-10-CM

## 2018-04-02 DIAGNOSIS — R05.9 COUGH: ICD-10-CM

## 2018-04-02 PROCEDURE — 99213 OFFICE O/P EST LOW 20 MIN: CPT | Mod: S$GLB,,, | Performed by: NURSE PRACTITIONER

## 2018-04-02 PROCEDURE — 99999 PR PBB SHADOW E&M-EST. PATIENT-LVL IV: CPT | Mod: PBBFAC,,, | Performed by: NURSE PRACTITIONER

## 2018-04-02 RX ORDER — CHOLECALCIFEROL (VITAMIN D3) 25 MCG
1000 TABLET ORAL DAILY
COMMUNITY
End: 2018-04-10 | Stop reason: SDUPTHER

## 2018-04-02 RX ORDER — MOMETASONE FUROATE 50 UG/1
2 SPRAY, METERED NASAL DAILY PRN
Qty: 17 G | Refills: 0 | Status: SHIPPED | OUTPATIENT
Start: 2018-04-02 | End: 2019-04-11

## 2018-04-02 RX ORDER — AZITHROMYCIN 250 MG/1
TABLET, FILM COATED ORAL
Qty: 6 TABLET | Refills: 0 | Status: SHIPPED | OUTPATIENT
Start: 2018-04-02 | End: 2018-04-07

## 2018-04-02 NOTE — PATIENT INSTRUCTIONS
Understanding Sinus Problems    You dont often think about your sinuses until theres a problem. One day you realize you cant smell dinner cooking. Or you find you often have headaches or problems breathing through your nose.  Symptoms of sinus problems  Sinus problems can cause uncomfortable symptoms. Your nose may run constantly. You might have trouble sleeping at night. You may even lose your sense of smell. Other symptoms can include:  · Nasal congestion  · Fullness in ears  · Green, yellow, or bloody drainage from the nose  · Trouble tasting food  · Frequent headaches  · Facial pain  · Cough  When sinuses are blocked  If something blocks the passages in the nose or sinuses, mucus cant drain. Mucus-filled sinuses often become infected.  · Colds cause the lining of the nose and sinuses to swell and make extra mucus. A buildup of mucus can lead to a more serious infection.  · Allergies irritate turbinates and other tissues. This causes swelling, which can cause a blockage. Over time, this irritation can also lead to sacs of swollen tissue (polyps).  · Polyps may form in both the sinuses and nose. Polyps can grow large enough to clog nasal passages and block drainage.  · A crooked (deviated) septum may block nasal passages. This is often the result of an injury.  Date Last Reviewed: 11/1/2016  © 1285-3482 [employer]. [address] [place] [address], [place] [address]. All rights reserved. This information is not intended as a substitute for professional medical care. Always follow your healthcare professional's instructions.

## 2018-04-10 ENCOUNTER — TELEPHONE (OUTPATIENT)
Dept: FAMILY MEDICINE | Facility: CLINIC | Age: 58
End: 2018-04-10

## 2018-04-10 DIAGNOSIS — R79.89 LOW SERUM VITAMIN D: ICD-10-CM

## 2018-04-10 RX ORDER — CHOLECALCIFEROL (VITAMIN D3) 25 MCG
2000 TABLET ORAL DAILY
Qty: 60 TABLET | Refills: 5 | Status: SHIPPED | OUTPATIENT
Start: 2018-04-10 | End: 2018-06-05

## 2018-04-10 NOTE — TELEPHONE ENCOUNTER
I left a message on the patient's voicemail indicating that she continued to have low vitamin D levels despite therapy on vitamin D supplements.  I have increased the amount of vitamin D the patient should take, daily.  Questions have been directed to the clinic.

## 2018-04-12 DIAGNOSIS — M25.521 RIGHT ELBOW PAIN: Primary | ICD-10-CM

## 2018-04-17 ENCOUNTER — OFFICE VISIT (OUTPATIENT)
Dept: ORTHOPEDICS | Facility: CLINIC | Age: 58
End: 2018-04-17
Payer: OTHER MISCELLANEOUS

## 2018-04-17 ENCOUNTER — HOSPITAL ENCOUNTER (OUTPATIENT)
Dept: RADIOLOGY | Facility: HOSPITAL | Age: 58
Discharge: HOME | End: 2018-04-17
Attending: ORTHOPAEDIC SURGERY
Payer: OTHER MISCELLANEOUS

## 2018-04-17 VITALS
DIASTOLIC BLOOD PRESSURE: 75 MMHG | HEIGHT: 66 IN | HEART RATE: 89 BPM | BODY MASS INDEX: 31.36 KG/M2 | WEIGHT: 195.13 LBS | SYSTOLIC BLOOD PRESSURE: 137 MMHG

## 2018-04-17 DIAGNOSIS — M25.521 RIGHT ELBOW PAIN: ICD-10-CM

## 2018-04-17 DIAGNOSIS — S52.134A NONDISPLACED FRACTURE OF NECK OF RIGHT RADIUS, INITIAL ENCOUNTER FOR CLOSED FRACTURE: Primary | ICD-10-CM

## 2018-04-17 PROCEDURE — 73080 X-RAY EXAM OF ELBOW: CPT | Mod: 26,RT,, | Performed by: RADIOLOGY

## 2018-04-17 PROCEDURE — 99999 PR PBB SHADOW E&M-EST. PATIENT-LVL III: CPT | Mod: PBBFAC,,, | Performed by: ORTHOPAEDIC SURGERY

## 2018-04-17 PROCEDURE — 73080 X-RAY EXAM OF ELBOW: CPT | Mod: TC,PN,RT

## 2018-04-17 PROCEDURE — 99213 OFFICE O/P EST LOW 20 MIN: CPT | Mod: S$GLB,,, | Performed by: ORTHOPAEDIC SURGERY

## 2018-04-17 NOTE — PROGRESS NOTES
Past Medical History:   Diagnosis Date    GERD (gastroesophageal reflux disease)     Irritable bowel syndrome     mixed type, with slightly predominant constipation       Past Surgical History:   Procedure Laterality Date    HERNIA REPAIR      hiatal hernia repair in 2013      SINUS SURGERY      TONSILLECTOMY      TUMOR EXCISION         Current Outpatient Prescriptions   Medication Sig    esomeprazole (NEXIUM) 40 MG capsule Take 1 capsule (40 mg total) by mouth before breakfast.    mometasone (NASONEX) 50 mcg/actuation nasal spray 2 sprays by Nasal route daily as needed (allergies).    vitamin D 1000 units Tab Take 2 tablets (2,000 Units total) by mouth once daily.     No current facility-administered medications for this visit.        Review of patient's allergies indicates:  No Known Allergies    Family History   Problem Relation Age of Onset    Psoriasis Brother     Breast cancer Mother 55    Eczema Neg Hx     Lupus Neg Hx     Melanoma Neg Hx        Social History     Social History    Marital status:      Spouse name: N/A    Number of children: N/A    Years of education: N/A     Occupational History    Not on file.     Social History Main Topics    Smoking status: Former Smoker     Quit date: 9/13/1989    Smokeless tobacco: Never Used    Alcohol use Yes      Comment: occasionally    Drug use: No    Sexual activity: Not Currently     Other Topics Concern    Not on file     Social History Narrative    No narrative on file       Chief Complaint:   Chief Complaint   Patient presents with    Right Elbow - Injury       Consulting Physician: No ref. provider found    History of present illness:    This is a 57 y.o. female who complains of right elbow pain following a fall 1-17-18. Pain 0/10.    Review of Systems:    Constitution: Denies chills, fever, and sweats.  HENT: Denies headaches or blurry vision.  Cardiovascular: Denies chest pain or irregular heart beat.  Respiratory: Denies  "cough or shortness of breath.  Gastrointestinal: Denies abdominal pain, nausea, or vomiting.  Musculoskeletal:  Denies muscle cramps.  Neurological: Denies dizziness or focal weakness.  Psychiatric/Behavioral: Normal mental status.  Hematologic/Lymphatic: Denies bleeding problem or easy bruising/bleeding.  Skin: Denies rash or suspicious lesions.    Examination:    Vital Signs:    Vitals:    04/17/18 0827   BP: 137/75   Pulse: 89   Weight: 88.5 kg (195 lb 1.7 oz)   Height: 5' 6" (1.676 m)   PainSc:   1   PainLoc: Elbow       Body mass index is 31.49 kg/m².    This a well-developed, well nourished patient in no acute distress.    Alert and oriented x 3 and cooperative to examination.       Physical Exam: Right Elbow Exam    Skin  Scars:   None  Rash:   None    Inspection  Erythema:  None  Bruising:  None  Swelling:  none  Masses:  None  Lymphadenopathy: None    Range of Motion  Full    Tenderness  Medial Epicondyle: None  Lateral Epicondyle: None  Olecranon:  None  Radial head  none    Stability  Normal     Strength:  Normal  Sensation:  Intact  Cubital Tinel's:     Negative    Vascular  Pulses:  Palpable  Capillary Refill: Normal          Imaging: X-rays ordered and reviewed today personally of the right elbow show a non-displaced radial neck fracture with healing       Assessment: Nondisplaced fracture of neck of right radius, initial encounter for closed fracture        Plan: Doing well. Full ROM. No pain. Unrestricted work use at this time. Refrain from sport for another 4 weeks. PRN.    DISCLAIMER: This note may have been dictated using voice recognition software and may contain grammatical errors.     NOTE: Consult report sent to referring provider via [facility] EMR.  "

## 2018-05-01 ENCOUNTER — OFFICE VISIT (OUTPATIENT)
Dept: FAMILY MEDICINE | Facility: CLINIC | Age: 58
End: 2018-05-01
Payer: COMMERCIAL

## 2018-05-01 VITALS
SYSTOLIC BLOOD PRESSURE: 118 MMHG | DIASTOLIC BLOOD PRESSURE: 72 MMHG | WEIGHT: 193.81 LBS | TEMPERATURE: 100 F | BODY MASS INDEX: 31.15 KG/M2 | HEIGHT: 66 IN

## 2018-05-01 DIAGNOSIS — G47.09 OTHER INSOMNIA: ICD-10-CM

## 2018-05-01 DIAGNOSIS — J32.9 CHRONIC SINUSITIS WITH RECURRENT BRONCHITIS: Primary | ICD-10-CM

## 2018-05-01 DIAGNOSIS — J40 CHRONIC SINUSITIS WITH RECURRENT BRONCHITIS: Primary | ICD-10-CM

## 2018-05-01 PROCEDURE — 99214 OFFICE O/P EST MOD 30 MIN: CPT | Mod: S$GLB,,, | Performed by: FAMILY MEDICINE

## 2018-05-01 PROCEDURE — 99999 PR PBB SHADOW E&M-EST. PATIENT-LVL III: CPT | Mod: PBBFAC,,, | Performed by: FAMILY MEDICINE

## 2018-05-01 RX ORDER — PREDNISONE 20 MG/1
20 TABLET ORAL DAILY
Qty: 10 TABLET | Refills: 0 | Status: SHIPPED | OUTPATIENT
Start: 2018-05-01 | End: 2018-05-11

## 2018-05-01 RX ORDER — PROMETHAZINE HYDROCHLORIDE AND CODEINE PHOSPHATE 6.25; 1 MG/5ML; MG/5ML
5 SOLUTION ORAL EVERY 4 HOURS PRN
Qty: 240 ML | Refills: 0 | Status: SHIPPED | OUTPATIENT
Start: 2018-05-01 | End: 2018-05-11

## 2018-05-01 RX ORDER — CETIRIZINE HYDROCHLORIDE, PSEUDOEPHEDRINE HYDROCHLORIDE 5; 120 MG/1; MG/1
1 TABLET, FILM COATED, EXTENDED RELEASE ORAL 2 TIMES DAILY
Qty: 30 TABLET | Refills: 0 | Status: SHIPPED | OUTPATIENT
Start: 2018-05-01 | End: 2018-05-11

## 2018-05-01 RX ORDER — TRAZODONE HYDROCHLORIDE 50 MG/1
50 TABLET ORAL NIGHTLY
Qty: 30 TABLET | Refills: 11 | Status: SHIPPED | OUTPATIENT
Start: 2018-05-01 | End: 2018-10-15

## 2018-05-01 NOTE — PROGRESS NOTES
Subjective:       Patient ID: Bing Burch is a 57 y.o. female.    Chief Complaint: URI (yellow mucus and cough)    HPI     Cough  Started approx 6 days ago.   Cough productive of yellow sputum.   Pt's  has similar symptoms.   She initially had sneezing which progressed to cough.   Pt has had issues with similar symptoms within the last month.   Pt was given steroid injection and Zpak which caused symptoms to resolved.   Pt has also taken Nasonex for this condition.    Pt has had subjective fever. Denies chills.     Insomnia  Pt reports that she only receives a couple hours of sleep per night.   She does not consume excess caffeine.   Pt noted to have low vit D on labs and has not had improvement since supplementing for this.   She works a full time job.       Past Medical History:   Diagnosis Date    GERD (gastroesophageal reflux disease)     Irritable bowel syndrome     mixed type, with slightly predominant constipation       Past Surgical History:   Procedure Laterality Date    HERNIA REPAIR      hiatal hernia repair in 2013      SINUS SURGERY      TONSILLECTOMY      TUMOR EXCISION         Family History   Problem Relation Age of Onset    Psoriasis Brother     Breast cancer Mother 55    Eczema Neg Hx     Lupus Neg Hx     Melanoma Neg Hx        Social History     Social History    Marital status:      Spouse name: N/A    Number of children: N/A    Years of education: N/A     Social History Main Topics    Smoking status: Former Smoker     Quit date: 9/13/1989    Smokeless tobacco: Never Used    Alcohol use Yes      Comment: occasionally    Drug use: No    Sexual activity: Not Currently     Other Topics Concern    None     Social History Narrative    None       Current Outpatient Prescriptions   Medication Sig Dispense Refill    esomeprazole (NEXIUM) 40 MG capsule Take 1 capsule (40 mg total) by mouth before breakfast. 90 capsule 1    mometasone (NASONEX) 50  mcg/actuation nasal spray 2 sprays by Nasal route daily as needed (allergies). 17 g 0    vitamin D 1000 units Tab Take 2 tablets (2,000 Units total) by mouth once daily. 60 tablet 5    cetirizine-pseudoephedrine 5-120 mg Tb12 Take 1 tablet by mouth 2 (two) times daily. 30 tablet 0    predniSONE (DELTASONE) 20 MG tablet Take 1 tablet (20 mg total) by mouth once daily. 10 tablet 0    promethazine-codeine 6.25-10 mg/5 ml (PHENERGAN WITH CODEINE) 6.25-10 mg/5 mL syrup Take 5 mLs by mouth every 4 (four) hours as needed. 240 mL 0    traZODone (DESYREL) 50 MG tablet Take 1 tablet (50 mg total) by mouth every evening. 30 tablet 11     No current facility-administered medications for this visit.        Review of patient's allergies indicates:  No Known Allergies    Review of Systems   Constitutional: Positive for fever. Negative for chills.   HENT: Positive for congestion, rhinorrhea, sinus pain and sinus pressure. Negative for sore throat.    Eyes: Negative for visual disturbance.   Respiratory: Positive for cough. Negative for shortness of breath.    Cardiovascular: Negative for chest pain and leg swelling.   Gastrointestinal: Negative for abdominal pain, blood in stool, constipation, diarrhea and vomiting.   Genitourinary: Negative for difficulty urinating, dysuria and hematuria.   Musculoskeletal: Negative for arthralgias.   Neurological: Negative for dizziness and weakness.       Objective:      Physical Exam   Constitutional: She appears well-developed and well-nourished. No distress.   HENT:   Head: Normocephalic and atraumatic.   Mouth/Throat: Oropharynx is clear and moist. No oropharyngeal exudate.   Mild erythema to posterior oropharynx.  No exudates.  Bilateral nasal turbinates with mild edema/erythema.     Eyes: EOM are normal. Pupils are equal, round, and reactive to light.   Neck: Normal range of motion. Neck supple. No thyromegaly present.   Cardiovascular: Normal rate, regular rhythm, normal heart sounds  and intact distal pulses.    Pulmonary/Chest: Effort normal and breath sounds normal. No respiratory distress. She has no wheezes.   Abdominal: Soft. Bowel sounds are normal. She exhibits no distension and no mass. There is no tenderness.   Musculoskeletal: She exhibits no edema.   Lymphadenopathy:     She has no cervical adenopathy.   Neurological: She is alert.   Skin: Skin is warm. No rash noted. No erythema.   Psychiatric: She has a normal mood and affect. Her behavior is normal.   Vitals reviewed.      Assessment:       1. Chronic sinusitis with recurrent bronchitis    2. Other insomnia        Plan:       1. Chronic sinusitis with recurrent bronchitis  - cetirizine-pseudoephedrine 5-120 mg Tb12; Take 1 tablet by mouth 2 (two) times daily.  Dispense: 30 tablet; Refill: 0  - predniSONE (DELTASONE) 20 MG tablet; Take 1 tablet (20 mg total) by mouth once daily.  Dispense: 10 tablet; Refill: 0  - promethazine-codeine 6.25-10 mg/5 ml (PHENERGAN WITH CODEINE) 6.25-10 mg/5 mL syrup; Take 5 mLs by mouth every 4 (four) hours as needed.  Dispense: 240 mL; Refill: 0    2. Other insomnia  Start trazodone on today.  Good sleep hygiene advised.    - traZODone (DESYREL) 50 MG tablet; Take 1 tablet (50 mg total) by mouth every evening.  Dispense: 30 tablet; Refill: 11    Portions of this note were created using Dragon voice recognition software. There may be voice recognition errors found in the text, and attempts were made to correct these errors prior to signature    Bill Friedman MD    Family Medicine  5/1/2018

## 2018-05-01 NOTE — PATIENT INSTRUCTIONS
Insomnia  Insomnia is repeated difficulty going to sleep or staying asleep, or both. Whether you have insomnia is not defined by a specific amount of sleep. Different people need different amounts of sleep, and you may need more or less sleep at different times of your life.  There are 3 major types of insomnia:  short-term, chronic, and other.  Short-term, or acute insomnia lasts less than 3 months.  The symptoms are temporary and can be linked directly to a stressor, such as the death of a loved one, financial problems, or a new physical problem.  Short-term insomnia stops when the stressor resolves or the person adapts to its presence.  Chronic insomnia occurs at least 3 times a week and lasts longer than 3 months.  Chronic insomnia can occur when either the cause of the sleeping problem is not clear, or the insomnia does not get better when the stressor is resolved. A number of other criteria are also used to make the diagnosis of chronic insomnia.   Other insomnia is the third type of insomnia-related sleep disorders.  This description applies to people who have problems getting to sleep or staying asleep, but do not meet all of the factors that describe either short-term or chronic insomnia.    Many things cause insomnia. Different people may have different causes. It can be from an underlying medical or psychological condition, or lifestyle. It can also be primary insomnia, which means no cause can be found.  Causes of insomnia include:  · Chronic medical problems- heart disease, gastrointestinal problems, hormonal changes, breathing problems  · Anxiety  · Stress  · Depression  · Pain  · Work schedule  · Sleep apnea  · Illegal drugs  · Certain medicines  Many different medidcines can affect your sleep, such as stimulants, caffeine, alcohol, some decongestants, and diet pills. Other medicines may include some types of blood pressure pills, steroids, asthma medicines, antihistamines, antidepressants,  seizure medicines and statins. Not all of these will affect your sleep, and they shouldnt be stopped without talking to your doctor.  Symptoms of insomnia can include:  · Lying awake for long periods at night before falling asleep  · Waking up several times during the night  · Waking up early in the morning and not being able to get back to sleep  · Feeling tired and not refreshed by sleep  · Not being able to function properly during the day and finding it hard to concentrate  · Irritability  · Tiredness and fatigue during the day  Home care  1. Review your medicines with your doctor or pharmacist to find out if they can cause insomnia. Not all medicines will affect your sleep, but they shouldn't be stopped without reviewing them with your doctor. There may be serious side effects and consequences from suddenly stopping your medicines. Not taking them may cause strokes, heart attacks, and many other problems.  2. Caffeine, smoking and alcohol also affect sleep. Limit your daily use and do not use these before bedtime. Alcohol may make you sleepy at first, but as its effects wear off, you may awaken a few hours later and have trouble returning to sleep.  3. Do not exercise, eat or drink large amounts of liquid within 2 hours of your bedtime.  4. Improve your sleep habits. Have a fixed bed and wake-up time. Try to keep noise, light and heat in your bedroom at a comfortable level. Try using earplugs or eyeshades if needed.   5. Avoid watching TV in bed.  6. If you do not fall asleep within 30 minutes, try to relax by reading or listening to soft music.  7. Limit daytime napping to one 30 minute period, early in the day.  8. Get regular exercise. Find other ways to lessen your stress level.  9. If a medicine was prescribed to help reset your sleep patterns, take it as directed. Sleeping pills are intended for short-term use, only. If taken for too long, the effect wears off while the risk of physical addiction and  psychological dependence increases.  Sleep diary  If the cause isnt obvious and it is not improving, try keeping a sleep diary for a couple of weeks. Include in it:  · The time you go to bed  · How long it takes to fall asleep  · How many times you wake up  · What time you wake up  · Your meal times and what you eat  · What time you drink alcohol  · Your exercise habits and times  Follow-up care  Follow up with your healthcare provider, or as advised. If X-rays or CT scans were done, you will be notified if there is a change in the reading, especially if it affects treatment.  Call 911  Call 911 if any of these occur:  · Trouble breathing  · Confusion or trouble waking  · Fainting or loss of consciousness  · Rapid heart rate  · New chest, arm, shoulder, neck or upper back pain  · Trouble with speech or vision, weakness of an arm or leg  · Trouble walking or talking, loss of balance, numbness or weakness in one side of your body, facial droop  When to seek medical advice  Call your healthcare provider right away if any of these occur:  · Extreme restlessness or irritability  · Confusion or hallucinations (seeing or hearing things that are not there)  · Anxiety, depression  · Several days without sleeping  Date Last Reviewed: 11/19/2015  © 6864-5152 [employer]. [address] [place] [address], [place] [address]. All rights reserved. This information is not intended as a substitute for professional medical care. Always follow your healthcare professional's instructions.        Treating Insomnia     Learning to relax before bedtime can improve your sleep.     Good sleeping habits are a key part of treatment. If needed, some medications may help you sleep better at first. Making healthy lifestyle changes and learning to relax can improve your sleep. Treating insomnia takes commitment, but trust that your efforts will pay off. Talk to your health care provider before taking any medication.  Healthy  lifestyle  Your lifestyle affects your health and your sleep. Here are some healthy habits:  · Keep a regular sleep schedule. Go to bed and get up at the same time each day.  · Exercise regularly. It may help you reduce stress. Avoid strenuous exercise for 2 to 4 hours before bedtime.  · Avoid or limit naps, especially in the late afternoon.  · Use your bed only for sleep and sex.  · Dont spend too much time in bed trying to fall asleep. If you cant fall asleep, get up and do something (no electronics) until you become tired and drowsy.  · Avoid or limit caffeine and nicotine for up to 6 hours before bedtime. They can keep you awake at night.   · Also avoid alcohol for at least 4 to 6 hours before bedtime. It may help you fall asleep at first, but you will have more awakenings throughout the night, and your sleep will not be restful.  Before bedtime  To sleep better every night, try these tips:  · Have a bedtime routine to let your body and mind know when its time to sleep.  · Think of going to bed as relaxing and enjoyable. Sleep will come sooner.  · If your worries dont let you sleep, write them down in a diary. Then close it, and go to bed.  · Make sure the room is not too hot or too cold. If its not dark enough, an eye mask can help. If its noisy, try using earplugs.  Learn to relax  Stress, anxiety, and body tension may keep you awake at night. To unwind before bedtime, try a warm bath, meditation, or yoga. Also, try the following:  · Deep breathing. Sit or lie back in a chair. Take a slow, deep breath. Hold it for 5 counts. Then breathe out slowly through your mouth. Keep doing this until you feel relaxed.  · Progressive muscle relaxation. Tense and then relax the muscles in your body as you breathe deeply. Start with your feet and work up your body to your neck and face.  Date Last Reviewed: 7/18/2015  © 2017-1139 [employer]. [address] [place] [address], [place] [address]. All rights reserved.  This information is not intended as a substitute for professional medical care. Always follow your healthcare professional's instructions.

## 2018-05-21 NOTE — PROGRESS NOTES
Subjective:       Patient ID: Bing Burch is a 57 y.o. female.    Chief Complaint: Cough (productive cough ,saturday felt feverish, body aches; fatigue )    Sinusitis   This is a new (Saw PCP on Thursday with same sxs was given steroid injection and Zyretec D.  Report no benefit.  Symptoms worsening. ) problem. The current episode started in the past 7 days. The problem has been gradually worsening since onset. The maximum temperature recorded prior to her arrival was 100.4 - 100.9 F. The fever has been present for 1 to 2 days. Her pain is at a severity of 0/10. She is experiencing no pain. Associated symptoms include chills, congestion, coughing, ear pain (pressure), headaches and sinus pressure. Pertinent negatives include no diaphoresis, hoarse voice, neck pain, shortness of breath, sneezing, sore throat or swollen glands. (Facial pressure ) Past treatments include oral decongestants and sitting up (antihistamine). The treatment provided no relief.     Review of Systems   Constitutional: Positive for chills, fatigue and fever. Negative for appetite change and diaphoresis.   HENT: Positive for congestion, ear pain (pressure), postnasal drip, sinus pain and sinus pressure. Negative for hoarse voice, rhinorrhea, sneezing, sore throat, tinnitus and trouble swallowing.    Eyes: Negative for pain, discharge, redness and itching.   Respiratory: Positive for cough. Negative for shortness of breath and wheezing.    Cardiovascular: Negative for chest pain, palpitations and leg swelling.   Gastrointestinal: Negative for abdominal pain, diarrhea, nausea and vomiting.   Genitourinary: Negative for dysuria and frequency.   Musculoskeletal: Positive for myalgias. Negative for neck pain.   Skin: Negative for pallor and rash.   Neurological: Positive for headaches.   All other systems reviewed and are negative.      Objective:      Physical Exam   Constitutional: She is oriented to person, place, and time. She appears  well-developed and well-nourished. No distress.   obese   HENT:   Head: Normocephalic and atraumatic.   Right Ear: External ear normal.   Left Ear: External ear normal.   Mouth/Throat: No oropharyngeal exudate.   Nose: nasal mucosa mildly erythematous and boggy with small amount mucoid congestion.  Maxillary sinuses TTP and abnormal to transillumination.  Throat: Mildly erythematous with small amount PND.     Eyes: Conjunctivae and EOM are normal. Pupils are equal, round, and reactive to light. Right eye exhibits no discharge. Left eye exhibits no discharge. No scleral icterus.   Neck: Normal range of motion. Neck supple.   Cardiovascular: Normal rate, regular rhythm and normal heart sounds.    Pulmonary/Chest: Effort normal and breath sounds normal.   Abdominal: Soft. Bowel sounds are normal. She exhibits no distension. There is no tenderness.   Musculoskeletal: Normal range of motion. She exhibits no edema.   Lymphadenopathy:     She has no cervical adenopathy.   Neurological: She is alert and oriented to person, place, and time.   Skin: Skin is warm and dry. Capillary refill takes less than 2 seconds. No rash noted. She is not diaphoretic.   Psychiatric: She has a normal mood and affect.   Nursing note and vitals reviewed.      Assessment:       1. Acute non-recurrent maxillary sinusitis    2. Cough    3. Fever and chills        Plan:       Acute non-recurrent maxillary sinusitis  -     azithromycin (Z-PATRICIA) 250 MG tablet; Take 2 tablets by mouth on day 1; Take 1 tablet by mouth on days 2-5  Dispense: 6 tablet; Refill: 0  -     mometasone (NASONEX) 50 mcg/actuation nasal spray; 2 sprays by Nasal route daily as needed (allergies).  Dispense: 17 g; Refill: 0    Cough  -     azithromycin (Z-PATRICIA) 250 MG tablet; Take 2 tablets by mouth on day 1; Take 1 tablet by mouth on days 2-5  Dispense: 6 tablet; Refill: 0  -     mometasone (NASONEX) 50 mcg/actuation nasal spray; 2 sprays by Nasal route daily as needed (allergies).   Dispense: 17 g; Refill: 0    Fever and chills  -     azithromycin (Z-PATRICIA) 250 MG tablet; Take 2 tablets by mouth on day 1; Take 1 tablet by mouth on days 2-5  Dispense: 6 tablet; Refill: 0  -     mometasone (NASONEX) 50 mcg/actuation nasal spray; 2 sprays by Nasal route daily as needed (allergies).  Dispense: 17 g; Refill: 0    Patient was instructed to increase fluids and rest today.  May use throat lozenge of their choice and OTC fever/pain reducer as per label instructions if needed.  Warm salt water gargles with 1/4 tsp of salt with 8 oz of water 2-3 times per day for sore throat.  Plain Mucinex for cough.  Continue other medications as prescribed.     Medication List with Changes/Refills   New Medications    AZITHROMYCIN (Z-PATRICIA) 250 MG TABLET    Take 2 tablets by mouth on day 1; Take 1 tablet by mouth on days 2-5    MOMETASONE (NASONEX) 50 MCG/ACTUATION NASAL SPRAY    2 sprays by Nasal route daily as needed (allergies).   Current Medications    CETIRIZINE-PSEUDOEPHEDRINE 5-120 MG TB12    Take 1 tablet by mouth 2 (two) times daily.    ESOMEPRAZOLE (NEXIUM) 40 MG CAPSULE    Take 1 capsule (40 mg total) by mouth before breakfast.    VITAMIN D 1000 UNITS TAB    Take 1,000 Units by mouth once daily.           I have reviewed the patient's past medical/surgical and social histories and updated as appropriate. Medications were reviewed and discussed as appropriate including side effects and risks versus benefit. Plan of care was reviewed and agreed upon with the patient.  An opportunity to ask questions was provided and explanation given. Patient verbalized understanding on all information reviewed and discussed.  The patient will follow up with PCP or sooner if needed. If symptoms worsen patient may call for ASAP appointment or report to the emergency department for further evaluation.        Detail Level: Zone Include Location In Plan?: No

## 2018-06-05 ENCOUNTER — HOSPITAL ENCOUNTER (EMERGENCY)
Facility: HOSPITAL | Age: 58
Discharge: HOME | End: 2018-06-06
Attending: EMERGENCY MEDICINE
Payer: COMMERCIAL

## 2018-06-05 DIAGNOSIS — R10.13 EPIGASTRIC ABDOMINAL PAIN: ICD-10-CM

## 2018-06-05 DIAGNOSIS — K21.9 GASTROESOPHAGEAL REFLUX DISEASE WITHOUT ESOPHAGITIS: ICD-10-CM

## 2018-06-05 DIAGNOSIS — K29.70 GASTRITIS, PRESENCE OF BLEEDING UNSPECIFIED, UNSPECIFIED CHRONICITY, UNSPECIFIED GASTRITIS TYPE: Primary | ICD-10-CM

## 2018-06-05 DIAGNOSIS — R07.9 CHEST PAIN: ICD-10-CM

## 2018-06-05 LAB
BASOPHILS # BLD AUTO: 0 K/UL
BASOPHILS NFR BLD: 0.4 %
DIFFERENTIAL METHOD: NORMAL
EOSINOPHIL # BLD AUTO: 0.2 K/UL
EOSINOPHIL NFR BLD: 2.4 %
ERYTHROCYTE [DISTWIDTH] IN BLOOD BY AUTOMATED COUNT: 12.9 %
HCT VFR BLD AUTO: 41.2 %
HGB BLD-MCNC: 14 G/DL
LYMPHOCYTES # BLD AUTO: 3.5 K/UL
LYMPHOCYTES NFR BLD: 34.4 %
MCH RBC QN AUTO: 30.4 PG
MCHC RBC AUTO-ENTMCNC: 33.9 G/DL
MCV RBC AUTO: 90 FL
MONOCYTES # BLD AUTO: 0.8 K/UL
MONOCYTES NFR BLD: 7.8 %
NEUTROPHILS # BLD AUTO: 5.6 K/UL
NEUTROPHILS NFR BLD: 55 %
PLATELET # BLD AUTO: 322 K/UL
PMV BLD AUTO: 9.6 FL
RBC # BLD AUTO: 4.6 M/UL
WBC # BLD AUTO: 10.2 K/UL

## 2018-06-05 PROCEDURE — 36415 COLL VENOUS BLD VENIPUNCTURE: CPT

## 2018-06-05 PROCEDURE — 99284 EMERGENCY DEPT VISIT MOD MDM: CPT | Mod: 25

## 2018-06-05 PROCEDURE — 83690 ASSAY OF LIPASE: CPT

## 2018-06-05 PROCEDURE — 85025 COMPLETE CBC W/AUTO DIFF WBC: CPT

## 2018-06-05 PROCEDURE — 84484 ASSAY OF TROPONIN QUANT: CPT

## 2018-06-05 PROCEDURE — 93010 ELECTROCARDIOGRAM REPORT: CPT | Mod: ,,, | Performed by: INTERNAL MEDICINE

## 2018-06-05 PROCEDURE — 25000003 PHARM REV CODE 250: Performed by: EMERGENCY MEDICINE

## 2018-06-05 PROCEDURE — 93005 ELECTROCARDIOGRAM TRACING: CPT

## 2018-06-05 PROCEDURE — 80053 COMPREHEN METABOLIC PANEL: CPT

## 2018-06-05 RX ORDER — SUCRALFATE 1 G/10ML
1 SUSPENSION ORAL
Status: COMPLETED | OUTPATIENT
Start: 2018-06-05 | End: 2018-06-05

## 2018-06-05 RX ORDER — FAMOTIDINE 20 MG/1
40 TABLET, FILM COATED ORAL
Status: COMPLETED | OUTPATIENT
Start: 2018-06-05 | End: 2018-06-05

## 2018-06-05 RX ORDER — CALCIUM CARBONATE 500(1250)
1 TABLET,CHEWABLE ORAL DAILY
COMMUNITY

## 2018-06-05 RX ORDER — PANTOPRAZOLE SODIUM 40 MG/1
40 TABLET, DELAYED RELEASE ORAL
Status: COMPLETED | OUTPATIENT
Start: 2018-06-05 | End: 2018-06-05

## 2018-06-05 RX ADMIN — PANTOPRAZOLE SODIUM 40 MG: 40 TABLET, DELAYED RELEASE ORAL at 11:06

## 2018-06-05 RX ADMIN — FAMOTIDINE 40 MG: 20 TABLET, FILM COATED ORAL at 11:06

## 2018-06-05 RX ADMIN — LIDOCAINE HYDROCHLORIDE: 20 SOLUTION ORAL; TOPICAL at 11:06

## 2018-06-05 RX ADMIN — SUCRALFATE 1 G: 1 SUSPENSION ORAL at 11:06

## 2018-06-06 VITALS
DIASTOLIC BLOOD PRESSURE: 75 MMHG | HEART RATE: 77 BPM | HEIGHT: 66 IN | RESPIRATION RATE: 16 BRPM | OXYGEN SATURATION: 97 % | SYSTOLIC BLOOD PRESSURE: 145 MMHG | TEMPERATURE: 98 F | BODY MASS INDEX: 31.18 KG/M2 | WEIGHT: 194 LBS

## 2018-06-06 LAB
ALBUMIN SERPL BCP-MCNC: 4.2 G/DL
ALP SERPL-CCNC: 75 U/L
ALT SERPL W/O P-5'-P-CCNC: 23 U/L
ANION GAP SERPL CALC-SCNC: 11 MMOL/L
AST SERPL-CCNC: 16 U/L
BILIRUB SERPL-MCNC: 0.9 MG/DL
BUN SERPL-MCNC: 16 MG/DL
CALCIUM SERPL-MCNC: 9.6 MG/DL
CHLORIDE SERPL-SCNC: 106 MMOL/L
CO2 SERPL-SCNC: 22 MMOL/L
CREAT SERPL-MCNC: 0.8 MG/DL
EST. GFR  (AFRICAN AMERICAN): >60 ML/MIN/1.73 M^2
EST. GFR  (NON AFRICAN AMERICAN): >60 ML/MIN/1.73 M^2
GLUCOSE SERPL-MCNC: 102 MG/DL
LIPASE SERPL-CCNC: 55 U/L
POTASSIUM SERPL-SCNC: 3.8 MMOL/L
PROT SERPL-MCNC: 7.6 G/DL
SODIUM SERPL-SCNC: 139 MMOL/L
TROPONIN I SERPL DL<=0.01 NG/ML-MCNC: <0.006 NG/ML

## 2018-06-06 PROCEDURE — 25000003 PHARM REV CODE 250: Performed by: EMERGENCY MEDICINE

## 2018-06-06 PROCEDURE — 63600175 PHARM REV CODE 636 W HCPCS: Performed by: EMERGENCY MEDICINE

## 2018-06-06 RX ORDER — DICYCLOMINE HYDROCHLORIDE 20 MG/1
20 TABLET ORAL 3 TIMES DAILY PRN
Qty: 21 TABLET | Refills: 0 | Status: SHIPPED | OUTPATIENT
Start: 2018-06-06 | End: 2018-06-13

## 2018-06-06 RX ORDER — ONDANSETRON 4 MG/1
8 TABLET, ORALLY DISINTEGRATING ORAL
Status: COMPLETED | OUTPATIENT
Start: 2018-06-06 | End: 2018-06-06

## 2018-06-06 RX ORDER — SUCRALFATE 1 G/1
1 TABLET ORAL
Qty: 28 TABLET | Refills: 0 | Status: SHIPPED | OUTPATIENT
Start: 2018-06-06 | End: 2018-06-13

## 2018-06-06 RX ORDER — ESOMEPRAZOLE MAGNESIUM 40 MG/1
40 CAPSULE, DELAYED RELEASE ORAL 2 TIMES DAILY
Qty: 14 CAPSULE | Refills: 0 | Status: SHIPPED | OUTPATIENT
Start: 2018-06-06 | End: 2019-05-20 | Stop reason: SDUPTHER

## 2018-06-06 RX ORDER — ONDANSETRON 4 MG/1
4 TABLET, ORALLY DISINTEGRATING ORAL EVERY 8 HOURS PRN
Qty: 12 TABLET | Refills: 0 | Status: SHIPPED | OUTPATIENT
Start: 2018-06-06 | End: 2019-04-11

## 2018-06-06 RX ORDER — HYOSCYAMINE SULFATE 0.12 MG/1
0.25 TABLET SUBLINGUAL
Status: COMPLETED | OUTPATIENT
Start: 2018-06-06 | End: 2018-06-06

## 2018-06-06 RX ORDER — HYDROCODONE BITARTRATE AND ACETAMINOPHEN 5; 325 MG/1; MG/1
1 TABLET ORAL
Status: COMPLETED | OUTPATIENT
Start: 2018-06-06 | End: 2018-06-06

## 2018-06-06 RX ADMIN — ONDANSETRON 8 MG: 4 TABLET, ORALLY DISINTEGRATING ORAL at 01:06

## 2018-06-06 RX ADMIN — HYOSCYAMINE SULFATE 0.25 MG: 0.12 TABLET ORAL; SUBLINGUAL at 01:06

## 2018-06-06 RX ADMIN — HYDROCODONE BITARTRATE AND ACETAMINOPHEN 1 TABLET: 5; 325 TABLET ORAL at 01:06

## 2018-06-06 NOTE — ED PROVIDER NOTES
Encounter Date: 6/5/2018    SCRIBE #1 NOTE: I, Ashley Woods, am scribing for, and in the presence of, Dr Cabral.       History     Chief Complaint   Patient presents with    Chest Pain     off and on for a couple of days    Back Pain     06/05/2018  11:20 PM     Chief Complaint: Chest Pain      Bing Burch is a 57 y.o. female with a pmhx of GERD on Nexium daily; IBS presenting to the E.D. with complaints of acute chest pain. Pt reports yesterday her pain was intermittent but was steady and constant throughout the day today. Rates 10/10. Pain is also located in epigastrium and radiates to her back as well. No exacerbating or alleviating factors. Denies SOB, emesis. Pt has a past surgical history that includes Hernia repair; Tumor excision; Tonsillectomy; Sinus surgery; and hiatal hernia repair in 2013.        The history is provided by the patient.     Review of patient's allergies indicates:  No Known Allergies  Past Medical History:   Diagnosis Date    GERD (gastroesophageal reflux disease)     Irritable bowel syndrome     mixed type, with slightly predominant constipation     Past Surgical History:   Procedure Laterality Date    HERNIA REPAIR      hiatal hernia repair in 2013      SINUS SURGERY      TONSILLECTOMY      TUMOR EXCISION       Family History   Problem Relation Age of Onset    Psoriasis Brother     Breast cancer Mother 55    Eczema Neg Hx     Lupus Neg Hx     Melanoma Neg Hx      Social History   Substance Use Topics    Smoking status: Former Smoker     Quit date: 9/13/1989    Smokeless tobacco: Never Used    Alcohol use Yes      Comment: occasionally     Review of Systems   Constitutional: Negative for fever.   HENT: Negative for sore throat.    Respiratory: Negative for shortness of breath.    Cardiovascular: Positive for chest pain.   Gastrointestinal: Negative for nausea.   Genitourinary: Negative for dysuria.   Musculoskeletal: Positive for back pain.   Skin: Negative  for rash.   Neurological: Negative for weakness.   Hematological: Does not bruise/bleed easily.       Physical Exam     Initial Vitals [06/05/18 2313]   BP Pulse Resp Temp SpO2   (!) 174/81 81 16 97.6 °F (36.4 °C) 99 %      MAP       112         Physical Exam    Nursing note and vitals reviewed.  Constitutional: She appears well-developed.   HENT:   Head: Normocephalic and atraumatic.   Eyes: EOM are normal. Pupils are equal, round, and reactive to light.   Neck: Neck supple.   Cardiovascular: Normal rate, regular rhythm, normal heart sounds and intact distal pulses. Exam reveals no gallop and no friction rub.    No murmur heard.  Pulmonary/Chest: Breath sounds normal. No respiratory distress. She has no decreased breath sounds. She has no wheezes. She has no rhonchi. She has no rales.   Abdominal: Soft. Bowel sounds are normal. She exhibits no distension. There is tenderness in the epigastric area. No hernia.   Reproducible tenderness in epigastrium.    Musculoskeletal: Normal range of motion.   Neurological: She is alert and oriented to person, place, and time.   Skin: Skin is warm and dry.   Old scar over abdomen.    Psychiatric: She has a normal mood and affect.         ED Course   Procedures  Labs Reviewed - No data to display          Medical Decision Making:   Symptoms more consistent with gastritis in cardiopulmonary disease.  Troponin is negative and x-ray appears to be stable. EKG is unchanged from May of 2017 with a nonspecific ST abnormality but no ST segment elevation or depression.  I feel that she is stable for discharge with follow-up to primary care and taking PPI.            Scribe Attestation:   Scribe #1: I performed the above scribed service and the documentation accurately describes the services I performed. I attest to the accuracy of the note.    I, Dr. Devaughn Cabral personally performed the services described in this documentation. All medical record entries made by the scribe were at my  direction and in my presence.  I have reviewed the chart and agree that the record reflects my personal performance and is accurate and complete. Devaughn Cabral MD.  4:11 PM 06/12/2018    DISCLAIMER: This note was prepared with Dragon NaturallySpeaking voice recognition transcription software. Garbled syntax, mangled pronouns, and other bizarre constructions may be attributed to that software system            Clinical Impression:   The primary encounter diagnosis was Gastritis, presence of bleeding unspecified, unspecified chronicity, unspecified gastritis type. Diagnoses of Chest pain, Epigastric abdominal pain, and Gastroesophageal reflux disease without esophagitis were also pertinent to this visit.    No orders to display                              Devaughn Cabral MD  06/12/18 [id number]

## 2018-06-30 ENCOUNTER — PATIENT MESSAGE (OUTPATIENT)
Dept: GASTROENTEROLOGY | Facility: CLINIC | Age: 58
End: 2018-06-30

## 2018-07-02 ENCOUNTER — PATIENT MESSAGE (OUTPATIENT)
Dept: GASTROENTEROLOGY | Facility: CLINIC | Age: 58
End: 2018-07-02

## 2018-07-02 RX ORDER — DICYCLOMINE HYDROCHLORIDE 20 MG/1
20 TABLET ORAL EVERY 6 HOURS
Qty: 120 TABLET | Refills: 0 | Status: SHIPPED | OUTPATIENT
Start: 2018-07-02 | End: 2018-08-01

## 2018-09-03 ENCOUNTER — HOSPITAL ENCOUNTER (EMERGENCY)
Facility: HOSPITAL | Age: 58
Discharge: HOME | End: 2018-09-03
Attending: EMERGENCY MEDICINE
Payer: COMMERCIAL

## 2018-09-03 VITALS
TEMPERATURE: 98 F | OXYGEN SATURATION: 95 % | HEIGHT: 66 IN | DIASTOLIC BLOOD PRESSURE: 76 MMHG | WEIGHT: 197.56 LBS | HEART RATE: 71 BPM | RESPIRATION RATE: 16 BRPM | BODY MASS INDEX: 31.75 KG/M2 | SYSTOLIC BLOOD PRESSURE: 133 MMHG

## 2018-09-03 DIAGNOSIS — K21.9 GASTROESOPHAGEAL REFLUX DISEASE, ESOPHAGITIS PRESENCE NOT SPECIFIED: Primary | ICD-10-CM

## 2018-09-03 DIAGNOSIS — R10.13 ACUTE EPIGASTRIC PAIN: ICD-10-CM

## 2018-09-03 DIAGNOSIS — M62.838 TRAPEZIUS MUSCLE SPASM: ICD-10-CM

## 2018-09-03 DIAGNOSIS — R35.0 URINARY FREQUENCY: ICD-10-CM

## 2018-09-03 LAB
ALBUMIN SERPL BCP-MCNC: 3.8 G/DL
ALP SERPL-CCNC: 75 U/L
ALT SERPL W/O P-5'-P-CCNC: 23 U/L
ANION GAP SERPL CALC-SCNC: 9 MMOL/L
AST SERPL-CCNC: 15 U/L
BASOPHILS # BLD AUTO: 0 K/UL
BASOPHILS NFR BLD: 0.6 %
BILIRUB SERPL-MCNC: 1 MG/DL
BILIRUB UR QL STRIP: NEGATIVE
BUN SERPL-MCNC: 16 MG/DL
CALCIUM SERPL-MCNC: 9.7 MG/DL
CHLORIDE SERPL-SCNC: 106 MMOL/L
CLARITY UR: CLEAR
CO2 SERPL-SCNC: 24 MMOL/L
COLOR UR: YELLOW
CREAT SERPL-MCNC: 0.8 MG/DL
DIFFERENTIAL METHOD: NORMAL
EOSINOPHIL # BLD AUTO: 0.3 K/UL
EOSINOPHIL NFR BLD: 3.6 %
ERYTHROCYTE [DISTWIDTH] IN BLOOD BY AUTOMATED COUNT: 12.9 %
EST. GFR  (AFRICAN AMERICAN): >60 ML/MIN/1.73 M^2
EST. GFR  (NON AFRICAN AMERICAN): >60 ML/MIN/1.73 M^2
GLUCOSE SERPL-MCNC: 105 MG/DL
GLUCOSE UR QL STRIP: NEGATIVE
HCT VFR BLD AUTO: 40 %
HGB BLD-MCNC: 13.2 G/DL
HGB UR QL STRIP: NEGATIVE
KETONES UR QL STRIP: NEGATIVE
LEUKOCYTE ESTERASE UR QL STRIP: NEGATIVE
LIPASE SERPL-CCNC: 32 U/L
LYMPHOCYTES # BLD AUTO: 2.5 K/UL
LYMPHOCYTES NFR BLD: 29.1 %
MCH RBC QN AUTO: 29.8 PG
MCHC RBC AUTO-ENTMCNC: 33 G/DL
MCV RBC AUTO: 90 FL
MONOCYTES # BLD AUTO: 0.8 K/UL
MONOCYTES NFR BLD: 9.4 %
NEUTROPHILS # BLD AUTO: 4.8 K/UL
NEUTROPHILS NFR BLD: 57.3 %
NITRITE UR QL STRIP: NEGATIVE
PH UR STRIP: 7 [PH] (ref 5–8)
PLATELET # BLD AUTO: 281 K/UL
PMV BLD AUTO: 9.9 FL
POTASSIUM SERPL-SCNC: 3.8 MMOL/L
PROT SERPL-MCNC: 6.8 G/DL
PROT UR QL STRIP: NEGATIVE
RBC # BLD AUTO: 4.42 M/UL
SODIUM SERPL-SCNC: 139 MMOL/L
SP GR UR STRIP: <=1.005 (ref 1–1.03)
TROPONIN I SERPL DL<=0.01 NG/ML-MCNC: <0.006 NG/ML
URN SPEC COLLECT METH UR: ABNORMAL
UROBILINOGEN UR STRIP-ACNC: NEGATIVE EU/DL
WBC # BLD AUTO: 8.4 K/UL

## 2018-09-03 PROCEDURE — 81003 URINALYSIS AUTO W/O SCOPE: CPT

## 2018-09-03 PROCEDURE — 96375 TX/PRO/DX INJ NEW DRUG ADDON: CPT

## 2018-09-03 PROCEDURE — 63600175 PHARM REV CODE 636 W HCPCS: Performed by: EMERGENCY MEDICINE

## 2018-09-03 PROCEDURE — 25000003 PHARM REV CODE 250: Performed by: EMERGENCY MEDICINE

## 2018-09-03 PROCEDURE — 93010 ELECTROCARDIOGRAM REPORT: CPT | Mod: ,,, | Performed by: INTERNAL MEDICINE

## 2018-09-03 PROCEDURE — 99284 EMERGENCY DEPT VISIT MOD MDM: CPT | Mod: 25

## 2018-09-03 PROCEDURE — 85025 COMPLETE CBC W/AUTO DIFF WBC: CPT

## 2018-09-03 PROCEDURE — 83690 ASSAY OF LIPASE: CPT

## 2018-09-03 PROCEDURE — 36415 COLL VENOUS BLD VENIPUNCTURE: CPT

## 2018-09-03 PROCEDURE — 96365 THER/PROPH/DIAG IV INF INIT: CPT

## 2018-09-03 PROCEDURE — 80053 COMPREHEN METABOLIC PANEL: CPT

## 2018-09-03 PROCEDURE — 84484 ASSAY OF TROPONIN QUANT: CPT

## 2018-09-03 PROCEDURE — 93005 ELECTROCARDIOGRAM TRACING: CPT

## 2018-09-03 PROCEDURE — C9113 INJ PANTOPRAZOLE SODIUM, VIA: HCPCS | Performed by: EMERGENCY MEDICINE

## 2018-09-03 RX ORDER — PANTOPRAZOLE SODIUM 40 MG/10ML
40 INJECTION, POWDER, LYOPHILIZED, FOR SOLUTION INTRAVENOUS
Status: COMPLETED | OUTPATIENT
Start: 2018-09-03 | End: 2018-09-03

## 2018-09-03 RX ORDER — DIAZEPAM 5 MG/1
5 TABLET ORAL EVERY 6 HOURS PRN
Qty: 4 TABLET | Refills: 0 | Status: SHIPPED | OUTPATIENT
Start: 2018-09-03 | End: 2019-07-08

## 2018-09-03 RX ORDER — DIAZEPAM 5 MG/1
5 TABLET ORAL
Status: COMPLETED | OUTPATIENT
Start: 2018-09-03 | End: 2018-09-03

## 2018-09-03 RX ADMIN — DIAZEPAM 5 MG: 5 TABLET ORAL at 04:09

## 2018-09-03 RX ADMIN — PANTOPRAZOLE SODIUM 40 MG: 40 INJECTION, POWDER, LYOPHILIZED, FOR SOLUTION INTRAVENOUS at 04:09

## 2018-09-03 RX ADMIN — PROMETHAZINE HYDROCHLORIDE 12.5 MG: 25 INJECTION INTRAMUSCULAR; INTRAVENOUS at 04:09

## 2018-09-03 RX ADMIN — SODIUM CHLORIDE 1000 ML: 0.9 INJECTION, SOLUTION INTRAVENOUS at 04:09

## 2018-09-03 RX ADMIN — LIDOCAINE HYDROCHLORIDE: 20 SOLUTION ORAL; TOPICAL at 04:09

## 2018-09-03 NOTE — ED PROVIDER NOTES
Encounter Date: 9/3/2018       History     Chief Complaint   Patient presents with    Abdominal Pain     started today after dinner, took zofran, bentyl, and nexium    Nausea    Neck Pain     x 2 days      This patient is a 57-year-old female with a past history of reflux disease, chronic neck pain, GERD, IBS presenting with complaints of epigastric burning style pain with underlying nausea without vomiting for approximately 5 hr.  She additionally reports stiff shoulder muscles.  She is endorsing urinary frequency also without hematuria or dysuria.  She reports being at a football game earlier today and sat in a car for approximately 8 hr total.  She reports eating brought worst and drinking beer which has exacerbated GERD in the past.  She denies a history of chronic urinary tract infections, kidney stone disease or pyelonephritis.  She reports taking Bentyl, Zofran, Nexium without significant improvement of her reflux type symptoms.          Review of patient's allergies indicates:  No Known Allergies  Past Medical History:   Diagnosis Date    GERD (gastroesophageal reflux disease)     Irritable bowel syndrome     mixed type, with slightly predominant constipation     Past Surgical History:   Procedure Laterality Date    HERNIA REPAIR      hiatal hernia repair in 2013      SINUS SURGERY      TONSILLECTOMY      TUMOR EXCISION       Family History   Problem Relation Age of Onset    Psoriasis Brother     Breast cancer Mother 55    Eczema Neg Hx     Lupus Neg Hx     Melanoma Neg Hx      Social History     Tobacco Use    Smoking status: Former Smoker     Last attempt to quit: 1989     Years since quittin.9    Smokeless tobacco: Never Used   Substance Use Topics    Alcohol use: Yes     Comment: occasionally    Drug use: No     Review of Systems   Constitutional: Negative for fever.   HENT: Negative for congestion.    Respiratory: Negative for shortness of breath.    Cardiovascular: Negative  for chest pain.   Gastrointestinal: Positive for nausea.   Genitourinary: Positive for frequency.   Musculoskeletal: Positive for neck stiffness.   Skin: Negative for rash.   Allergic/Immunologic: Negative for immunocompromised state.   Hematological: Does not bruise/bleed easily.   Psychiatric/Behavioral: Negative for confusion.       Physical Exam     Initial Vitals [09/03/18 0323]   BP Pulse Resp Temp SpO2   (!) 149/83 78 18 97.9 °F (36.6 °C) 97 %      MAP       --         Physical Exam    Nursing note and vitals reviewed.  Constitutional: She appears well-nourished. No distress.   HENT:   Head: Normocephalic and atraumatic.   Eyes: Conjunctivae and EOM are normal.   Neck:   Tenderness with palpation of the bilateral trapezius musculature, no midline cervical pain, no meningismus   Cardiovascular: Normal rate and regular rhythm.   Pulmonary/Chest: No respiratory distress. She has no wheezes.   Abdominal: Bowel sounds are normal. She exhibits no distension and no mass. There is tenderness.   Mild epigastric and left upper quadrant tenderness to palpation without abdominal bloating or rigidity   Musculoskeletal: Normal range of motion. She exhibits no edema.   Neurological: She is alert and oriented to person, place, and time.   Skin: Skin is warm and dry. Capillary refill takes less than 2 seconds. No rash noted. No erythema.   Psychiatric: She has a normal mood and affect. Thought content normal.         ED Course   Procedures  Labs Reviewed   URINALYSIS, REFLEX TO URINE CULTURE - Abnormal; Notable for the following components:       Result Value    Specific Gravity, UA <=1.005 (*)     All other components within normal limits    Narrative:     Preferred Collection Type->Urine, Clean Catch   CBC W/ AUTO DIFFERENTIAL   COMPREHENSIVE METABOLIC PANEL   LIPASE   TROPONIN I          Imaging Results          US Abdomen Limited (In process)                  Medical Decision Making:   ED Management:    This patient was  interviewed and examined emergently.  History is most consistent with an acute exacerbation of chronic problems.  The patient's urinalysis is unremarkable for evidence progressing infection or inflammation or dehydration.  Patient had full resolution of symptoms with antispasmodic and anti GERD medication.  Additional labs found to be unremarkable.  Right upper quadrant ultrasound does not indicate gallbladder pathology as a contributor.  I additionally doubt occult life-threatening pathology, including atypical ACS, appendicitis, intra-abdominal abscess, perforation, obstruction is contributing factors.  She is educated about supportive care at home and is asked to follow up with her primary care doctor as soon as possible regarding improvement.  She is additionally asked to return to the ER for any new, concerning, or worsening symptoms.  Patient was agreeable with this plan for follow-up and she was discharged in stable condition with her  as a .                      Clinical Impression:   The primary encounter diagnosis was Gastroesophageal reflux disease, esophagitis presence not specified. Diagnoses of Acute epigastric pain, Trapezius muscle spasm, and Urinary frequency were also pertinent to this visit.      Disposition:   Disposition: Discharged  Condition: Stable                        Marco Antonio Vallejo MD  09/03/18 6184

## 2018-10-11 ENCOUNTER — OFFICE VISIT (OUTPATIENT)
Dept: DERMATOLOGY | Facility: CLINIC | Age: 58
End: 2018-10-11
Payer: COMMERCIAL

## 2018-10-11 DIAGNOSIS — L81.4 SOLAR LENTIGO: ICD-10-CM

## 2018-10-11 DIAGNOSIS — L82.1 SEBORRHEIC KERATOSES: ICD-10-CM

## 2018-10-11 DIAGNOSIS — D18.01 CHERRY ANGIOMA: ICD-10-CM

## 2018-10-11 DIAGNOSIS — D48.9 NEOPLASM OF UNCERTAIN BEHAVIOR: Primary | ICD-10-CM

## 2018-10-11 PROCEDURE — 99999 PR PBB SHADOW E&M-EST. PATIENT-LVL III: CPT | Mod: PBBFAC,,, | Performed by: DERMATOLOGY

## 2018-10-11 PROCEDURE — 88305 TISSUE EXAM BY PATHOLOGIST: CPT | Performed by: PATHOLOGY

## 2018-10-11 PROCEDURE — 99213 OFFICE O/P EST LOW 20 MIN: CPT | Mod: 25,S$GLB,, | Performed by: DERMATOLOGY

## 2018-10-11 PROCEDURE — 11101 PR BIOPSY, EACH ADDED LESION: CPT | Mod: S$GLB,,, | Performed by: DERMATOLOGY

## 2018-10-11 PROCEDURE — 11100 PR BIOPSY OF SKIN LESION: CPT | Mod: S$GLB,,, | Performed by: DERMATOLOGY

## 2018-10-11 NOTE — PROGRESS NOTES
Subjective:       Patient ID:  Bing Burch is a 57 y.o. female who presents for   Chief Complaint   Patient presents with    Spot     nose    Lesion     forehead     Patient last seen 02/2017 with AKs treated witch cryo    H/o intense sun exposure, youth in [place], blistyering sunburns  No h/o skin cancer,  New complaint today            Spot  - Initial  Affected locations: nose  Duration: 6 months  Signs / symptoms: redness (sore)  Severity: mild  Timing: constant  Aggravated by: nothing  Relieving factors/Treatments tried: nothing    Lesion  - Initial  Affected locations: forehead  Duration: weeks.  Signs / symptoms: redness and scaling  Severity: mild  Timing: constant  Aggravated by: nothing  Relieving factors/Treatments tried: nothing        Review of Systems   Constitutional: Negative for fever, chills and fatigue.   Skin: Positive for daily sunscreen use, activity-related sunscreen use and wears hat.   Hematologic/Lymphatic: Does not bruise/bleed easily.        Objective:    Physical Exam   Constitutional: She appears well-developed and well-nourished. No distress.   Neurological: She is alert and oriented to person, place, and time. She is not disoriented.   Psychiatric: She has a normal mood and affect.   Skin:   Areas Examined (abnormalities noted in diagram):   Scalp / Hair Palpated and Inspected  Head / Face Inspection Performed  Neck Inspection Performed  Chest / Axilla Inspection Performed  Abdomen Inspection Performed  Back Inspection Performed  RUE Inspected  LUE Inspection Performed  Nails and Digits Inspection Performed                   Diagram Legend     Erythematous scaling macule/papule c/w actinic keratosis       Vascular papule c/w angioma      Pigmented verrucoid papule/plaque c/w seborrheic keratosis      Yellow umbilicated papule c/w sebaceous hyperplasia      Irregularly shaped tan macule c/w lentigo     1-2 mm smooth white papules consistent with Milia      Movable subcutaneous  cyst with punctum c/w epidermal inclusion cyst      Subcutaneous movable cyst c/w pilar cyst      Firm pink to brown papule c/w dermatofibroma      Pedunculated fleshy papule(s) c/w skin tag(s)      Evenly pigmented macule c/w junctional nevus     Mildly variegated pigmented, slightly irregular-bordered macule c/w mildly atypical nevus      Flesh colored to evenly pigmented papule c/w intradermal nevus       Pink pearly papule/plaque c/w basal cell carcinoma      Erythematous hyperkeratotic cursted plaque c/w SCC      Surgical scar with no sign of skin cancer recurrence      Open and closed comedones      Inflammatory papules and pustules      Verrucoid papule consistent consistent with wart     Erythematous eczematous patches and plaques     Dystrophic onycholytic nail with subungual debris c/w onychomycosis     Umbilicated papule    Erythematous-base heme-crusted tan verrucoid plaque consistent with inflamed seborrheic keratosis     Erythematous Silvery Scaling Plaque c/w Psoriasis     See annotation              Assessment / Plan:      Pathology Orders:     Normal Orders This Visit    Tissue Specimen To Pathology, Dermatology     Questions:    Directional Terms:  Other(comment)    Clinical information:  1/2- left alar groove, crusted ulcerated depression, r/o BCC, 2/2- R upper vermillion border, pink scaly papule, r/o SCC    Specific Site:  1/2- left alar groove, , 2/2- R upper vermillion border,        Neoplasm of uncertain behavior  -     Tissue Specimen To Pathology, Dermatology  Shave biopsy procedure note:x2    Shave biopsy performed after verbal consent including risk of infection, scar, recurrence, need for additional treatment of site. Area prepped with alcohol, anesthetized with approximately 1.0cc of 1% lidocaine with epinephrine. Lesional tissue shaved with razor blade. Hemostasis achieved with application of aluminum chloride followed by hyfrecation. No complications. Dressing applied. Wound care  explained.    Seborrheic keratoses  These are benign inherited growths without a malignant potential. Reassurance given to patient. No treatment is necessary.     Solar lentigo  This is a benign hyperpigmented sun induced lesion. Daily sun protection will reduce the number of new lesions. Treatment of these benign lesions are considered cosmetic.    Cherry angioma  This is a benign vascular lesion. Reassurance given. No treatment required.     Patient instructed in importance in daily sun protection of at least spf 30. Sun avoidance and topical protection discussed.   Recommend Elta MD (physician office) COTZ sensitive (available online) for daily use on face and neck.  Patient encouraged to wear hat for all outdoor exposure.   Also discussed sun protective clothing.           Follow-up in about 6 months (around 4/11/2019).

## 2018-10-11 NOTE — PATIENT INSTRUCTIONS
Shave Biopsy Wound Care    Your doctor has performed a shave biopsy today.  A band aid and vaseline ointment has been placed over the site.  This should remain in place for 24 hours.  It is recommended that you keep the area dry for the first 24 hours.  After 24 hours, you may remove the band aid and wash the area with warm soap and water and apply Vaseline jelly.  Many patients prefer to use Neosporin or Bacitracin ointment.  This is acceptable; however, know that you can develop an allergy to this medication even if you have used it safely for years.  It is important to keep the area moist.  Letting it dry out and get air slows healing time, and will worsen the scar.  Band aid is optional after first 24 hours.      If you notice increasing redness, tenderness, pain, or yellow drainage at the biopsy site, please notify your doctor.  These are signs of an infection.    If your biopsy site is bleeding, apply firm pressure for 15 minutes straight.  Repeat for another 15 minutes, if it is still bleeding.   If the surgical site continues to bleed, then please contact your doctor.       [facility]  [facility]  [address] [place] [id number]  Dept: [phone]

## 2018-10-12 ENCOUNTER — DOCUMENTATION ONLY (OUTPATIENT)
Dept: FAMILY MEDICINE | Facility: CLINIC | Age: 58
End: 2018-10-12

## 2018-10-12 NOTE — PROGRESS NOTES
Pre-Visit Chart Review  For Appointment Scheduled on 10/15/2018    Health Maintenance Due   Topic Date Due    Colonoscopy  11/30/2009    Influenza Vaccine  08/01/2018

## 2018-10-15 ENCOUNTER — OFFICE VISIT (OUTPATIENT)
Dept: FAMILY MEDICINE | Facility: CLINIC | Age: 58
End: 2018-10-15
Payer: COMMERCIAL

## 2018-10-15 ENCOUNTER — LAB VISIT (OUTPATIENT)
Dept: LAB | Facility: HOSPITAL | Age: 58
End: 2018-10-15
Attending: PHYSICIAN ASSISTANT
Payer: COMMERCIAL

## 2018-10-15 VITALS
BODY MASS INDEX: 32.34 KG/M2 | WEIGHT: 201.25 LBS | TEMPERATURE: 98 F | SYSTOLIC BLOOD PRESSURE: 138 MMHG | HEIGHT: 66 IN | HEART RATE: 80 BPM | DIASTOLIC BLOOD PRESSURE: 82 MMHG

## 2018-10-15 DIAGNOSIS — F33.1 MODERATE EPISODE OF RECURRENT MAJOR DEPRESSIVE DISORDER: ICD-10-CM

## 2018-10-15 DIAGNOSIS — Z12.11 COLON CANCER SCREENING: ICD-10-CM

## 2018-10-15 DIAGNOSIS — E55.9 VITAMIN D INSUFFICIENCY: Primary | ICD-10-CM

## 2018-10-15 DIAGNOSIS — M54.12 CERVICAL RADICULAR PAIN: ICD-10-CM

## 2018-10-15 DIAGNOSIS — E55.9 VITAMIN D INSUFFICIENCY: ICD-10-CM

## 2018-10-15 LAB — 25(OH)D3+25(OH)D2 SERPL-MCNC: 25 NG/ML

## 2018-10-15 PROCEDURE — 82306 VITAMIN D 25 HYDROXY: CPT

## 2018-10-15 PROCEDURE — 3008F BODY MASS INDEX DOCD: CPT | Mod: CPTII,S$GLB,, | Performed by: PHYSICIAN ASSISTANT

## 2018-10-15 PROCEDURE — 99214 OFFICE O/P EST MOD 30 MIN: CPT | Mod: S$GLB,,, | Performed by: PHYSICIAN ASSISTANT

## 2018-10-15 PROCEDURE — 36415 COLL VENOUS BLD VENIPUNCTURE: CPT | Mod: PO

## 2018-10-15 PROCEDURE — 99999 PR PBB SHADOW E&M-EST. PATIENT-LVL IV: CPT | Mod: PBBFAC,,, | Performed by: PHYSICIAN ASSISTANT

## 2018-10-15 RX ORDER — HYDROXYZINE HYDROCHLORIDE 25 MG/1
25 TABLET, FILM COATED ORAL NIGHTLY PRN
Qty: 30 TABLET | Refills: 2 | Status: SHIPPED | OUTPATIENT
Start: 2018-10-15 | End: 2019-03-13 | Stop reason: SDUPTHER

## 2018-10-15 RX ORDER — METAXALONE 800 MG/1
800 TABLET ORAL 3 TIMES DAILY PRN
Qty: 30 TABLET | Refills: 0 | Status: SHIPPED | OUTPATIENT
Start: 2018-10-15 | End: 2018-10-25

## 2018-10-15 RX ORDER — BUPROPION HYDROCHLORIDE 150 MG/1
150 TABLET ORAL DAILY
Qty: 30 TABLET | Refills: 2 | Status: SHIPPED | OUTPATIENT
Start: 2018-10-15 | End: 2018-11-12

## 2018-10-15 NOTE — PROGRESS NOTES
Subjective:       Patient ID: Bing Burch is a 57 y.o. female.    Chief Complaint: Follow-up    Patient with history of depression presents for follow-up.  She states that she is currently experiencing increased stress at work, difficulty concentrating, insomnia, crying spells, and lack of motivation.  She states that these symptoms are similar to previous bouts of depression.  She has tried Cymbalta and Effexor in the past.  She was on a medication approximately 5 years ago.  She was recently trying trazodone for sleep but it did not help.  Patient states that she has close support with family and close friends.  She complains of weight gain which she directly attributes to her depression.  Patient is complaining of neck pain radiating into both shoulders and upper arms.  She denies injury.  Pain began several weeks ago.      Review of Systems   Constitutional: Negative for activity change and unexpected weight change.   HENT: Negative for hearing loss and trouble swallowing.    Eyes: Negative for visual disturbance.   Respiratory: Negative for chest tightness and wheezing.    Cardiovascular: Negative for chest pain and palpitations.   Gastrointestinal: Positive for constipation. Negative for blood in stool, diarrhea and vomiting.   Endocrine: Negative for polydipsia and polyuria.   Genitourinary: Negative for difficulty urinating, dysuria, hematuria and menstrual problem.   Musculoskeletal: Positive for arthralgias. Negative for joint swelling and neck pain.   Neurological: Negative for weakness and headaches.   Psychiatric/Behavioral: Positive for decreased concentration, dysphoric mood and sleep disturbance. Negative for agitation, behavioral problems, confusion, hallucinations, self-injury and suicidal ideas. The patient is not nervous/anxious and is not hyperactive.        Objective:      Physical Exam   Constitutional: She appears well-developed and well-nourished. She is cooperative. No distress.    Eyes: Conjunctivae and EOM are normal. Pupils are equal, round, and reactive to light. No scleral icterus.   Cardiovascular: Normal rate, regular rhythm and normal heart sounds.   Pulmonary/Chest: Effort normal and breath sounds normal.   Abdominal: Soft. Bowel sounds are normal.   Musculoskeletal:        Right shoulder: She exhibits tenderness and spasm. She exhibits normal range of motion and normal strength.        Left shoulder: She exhibits tenderness. She exhibits normal range of motion, no spasm and normal strength.        Cervical back: She exhibits tenderness (Cervical paraspinous). She exhibits normal range of motion and no spasm.        Right lower leg: She exhibits no edema.        Left lower leg: She exhibits no edema.   Neurological: She is alert.   Skin: Skin is warm and dry.   Psychiatric: Her speech is normal and behavior is normal. Judgment and thought content normal. Cognition and memory are normal. She exhibits a depressed mood ( tearful).   PHQ 9 score is 12    Vitals reviewed.      Assessment:       1. Vitamin D insufficiency    2. Moderate episode of recurrent major depressive disorder    3. Cervical radicular pain    4. Colon cancer screening        Plan:       Bing was seen today for follow-up.    Diagnoses and all orders for this visit:    Vitamin D insufficiency  -   Repeat   Vitamin D; Future    Moderate episode of recurrent major depressive disorder     buPROPion (WELLBUTRIN XL) 150 MG TB24 tablet; Take 1 tablet (150 mg total) by mouth once daily.  -     hydrOXYzine HCl (ATARAX) 25 MG tablet; Take 1 tablet (25 mg total) by mouth nightly as needed (insomnia).    Cervical radicular pain     metaxalone (SKELAXIN) 800 MG tablet; Take 1 tablet (800 mg total) by mouth 3 (three) times daily as needed for Pain.  Colon cancer screening  -     Ambulatory referral to Gastroenterology    -  -      Follow-up for appt with me in 4 week [facility] med .

## 2018-10-19 ENCOUNTER — TELEPHONE (OUTPATIENT)
Dept: DERMATOLOGY | Facility: CLINIC | Age: 58
End: 2018-10-19

## 2018-10-30 ENCOUNTER — TELEPHONE (OUTPATIENT)
Dept: GASTROENTEROLOGY | Facility: CLINIC | Age: 58
End: 2018-10-30

## 2018-10-30 NOTE — TELEPHONE ENCOUNTER
L/M on vm for pt to contact the office regarding screening colonoscopy recommended for 2020 per provider.

## 2018-11-07 DIAGNOSIS — Z12.11 COLON CANCER SCREENING: Primary | ICD-10-CM

## 2018-11-09 ENCOUNTER — DOCUMENTATION ONLY (OUTPATIENT)
Dept: FAMILY MEDICINE | Facility: CLINIC | Age: 58
End: 2018-11-09

## 2018-11-09 NOTE — PROGRESS NOTES
Pre-Visit Chart Review  For Appointment Scheduled on 11/12/2018    Health Maintenance Due   Topic Date Due    Colonoscopy  11/30/2009    Influenza Vaccine  08/01/2018

## 2018-11-12 ENCOUNTER — OFFICE VISIT (OUTPATIENT)
Dept: FAMILY MEDICINE | Facility: CLINIC | Age: 58
End: 2018-11-12
Payer: COMMERCIAL

## 2018-11-12 VITALS
BODY MASS INDEX: 32.42 KG/M2 | TEMPERATURE: 98 F | HEART RATE: 68 BPM | WEIGHT: 201.75 LBS | SYSTOLIC BLOOD PRESSURE: 138 MMHG | DIASTOLIC BLOOD PRESSURE: 85 MMHG | HEIGHT: 66 IN

## 2018-11-12 DIAGNOSIS — F33.1 MODERATE EPISODE OF RECURRENT MAJOR DEPRESSIVE DISORDER: Primary | ICD-10-CM

## 2018-11-12 PROCEDURE — 99999 PR PBB SHADOW E&M-EST. PATIENT-LVL IV: CPT | Mod: PBBFAC,,, | Performed by: PHYSICIAN ASSISTANT

## 2018-11-12 PROCEDURE — 99213 OFFICE O/P EST LOW 20 MIN: CPT | Mod: S$GLB,,, | Performed by: PHYSICIAN ASSISTANT

## 2018-11-12 PROCEDURE — 3008F BODY MASS INDEX DOCD: CPT | Mod: CPTII,S$GLB,, | Performed by: PHYSICIAN ASSISTANT

## 2018-11-12 RX ORDER — BUPROPION HYDROCHLORIDE 300 MG/1
300 TABLET ORAL DAILY
Qty: 30 TABLET | Refills: 5 | Status: SHIPPED | OUTPATIENT
Start: 2018-11-12 | End: 2019-07-08

## 2018-11-13 NOTE — PROGRESS NOTES
Subjective:       Patient ID: Bing Burch is a 57 y.o. female.    Chief Complaint: Follow-up    Patient with  depression presents for follow-up.  She states that she is currently experiencing increased stress at work, difficulty concentrating, insomnia, crying spells, and lack of motivation.  She states that these symptoms are similar to previous bouts of depression.  She has tried Cymbalta and Effexor in the past.  She was started on Wellbutrin last visit.  She states that she has noticed minimal improvement in the symptoms.  She was given Atarax for insomnia.  Atarax is helping nicely for the insomnia.   Patient states that she has close support with family and close friends.  She complains of weight gain which she directly attributes to her depression.      Review of Systems   Constitutional: Negative for activity change and unexpected weight change.   HENT: Negative for hearing loss, rhinorrhea and trouble swallowing.    Eyes: Negative for discharge and visual disturbance.   Respiratory: Negative for chest tightness and wheezing.    Cardiovascular: Negative for chest pain and palpitations.   Gastrointestinal: Negative for blood in stool, constipation, diarrhea and vomiting.   Endocrine: Negative for polydipsia and polyuria.   Genitourinary: Negative for difficulty urinating, dysuria, hematuria and menstrual problem.   Musculoskeletal: Negative for arthralgias, joint swelling and neck pain.   Neurological: Negative for weakness and headaches.   Psychiatric/Behavioral: Negative for confusion and dysphoric mood.       Objective:      Physical Exam   Constitutional: She appears well-developed and well-nourished. She is cooperative. No distress.   HENT:   Head: Normocephalic and atraumatic.   Cardiovascular: Normal rate, regular rhythm and normal heart sounds.   Pulmonary/Chest: Effort normal and breath sounds normal.   Neurological: She is alert.   Psychiatric: She has a normal mood and affect. Her speech  is normal and behavior is normal. Judgment normal. Cognition and memory are normal.   Vitals reviewed.      Assessment:       1. Moderate episode of recurrent major depressive disorder        Plan:       Bing was seen today for follow-up.    Diagnoses and all orders for this visit:    Moderate episode of recurrent major depressive disorder    Other orders  -     Increased  buPROPion (WELLBUTRIN XL) 300 MG 24 hr tablet; Take 1 tablet (300 mg total) by mouth once daily.

## 2018-11-28 ENCOUNTER — INITIAL CONSULT (OUTPATIENT)
Dept: DERMATOLOGY | Facility: CLINIC | Age: 58
End: 2018-11-28
Payer: COMMERCIAL

## 2018-11-28 VITALS
HEART RATE: 89 BPM | WEIGHT: 195 LBS | BODY MASS INDEX: 31.34 KG/M2 | SYSTOLIC BLOOD PRESSURE: 135 MMHG | HEIGHT: 66 IN | DIASTOLIC BLOOD PRESSURE: 89 MMHG

## 2018-11-28 DIAGNOSIS — C44.311 BASAL CELL CARCINOMA OF NOSE: Primary | ICD-10-CM

## 2018-11-28 PROCEDURE — 3008F BODY MASS INDEX DOCD: CPT | Mod: CPTII,S$GLB,, | Performed by: DERMATOLOGY

## 2018-11-28 PROCEDURE — 99214 OFFICE O/P EST MOD 30 MIN: CPT | Mod: S$GLB,,, | Performed by: DERMATOLOGY

## 2018-11-28 PROCEDURE — 99999 PR PBB SHADOW E&M-EST. PATIENT-LVL III: CPT | Mod: PBBFAC,,, | Performed by: DERMATOLOGY

## 2018-11-28 NOTE — PROGRESS NOTES
ALLERGIES:  Patient has no known allergies.    CHIEF COMPLAINT:  This 57 y.o. female comes for evaluation for Mohs' Micrographic Surgery, Fresh Tissue Technique, for treatment of a biopsy-proven basal cell carcinoma on the left alar groove. Consultation requested by Sarah Sheridan M.D.    HISTORY OF PRESENT ILLNESS:   Location: left alar groove  Duration: 6 months or more  Quality: persistent  Context: status post biopsy by Sarah Sheridan M.D.; path = basal cell carcinoma; pathology accession [facility][id number], [facility]    Prior Treatment: none  See also the handwritten notes/diagrams scanned to chart for additional details.    Defibrillator: No  Pacemaker: No  Artificial heart valves: No  Artificial joints: No    REVIEW OF SYSTEMS:   General: general health good  Skin: has no previous history of skin cancer(s)  CV: has no hypertension, no artificial valves, has no chest pain  Resp: has no shortness of breath  Endo: has no diabetes  Hem/Lymph: not taking prescribed anticoagulants, has no easy bruising/bleeding  Allergy/Immuno: has no allergies as noted above  GI: has no history of hepatitis  MS: as noted above     PAST MEDICAL HISTORY:  Past Medical History:   Diagnosis Date    GERD (gastroesophageal reflux disease)     Irritable bowel syndrome     mixed type, with slightly predominant constipation       PAST SURGICAL HISTORY:  Past Surgical History:   Procedure Laterality Date    ESOPHAGOGASTRODUODENOSCOPY (EGD) N/A 2017    [facility]    HERNIA REPAIR      hiatal hernia repair in 2013      SINUS SURGERY      TONSILLECTOMY      TUMOR EXCISION          SOCIAL HISTORY:  Dependencies: smoking status as noted below  Social History     Tobacco Use    Smoking status: Former Smoker     Last attempt to quit: 1989     Years since quittin.2    Smokeless tobacco: Never Used   Substance Use Topics    Alcohol use: Yes     Comment: occasionally    Drug use: No        PERTINENT MEDICATIONS:  See medications list.    Current Outpatient Medications:     buPROPion (WELLBUTRIN XL) 300 MG 24 hr tablet, Take 1 tablet (300 mg total) by mouth once daily., Disp: 30 tablet, Rfl: 5    calcium carbonate (OS-ALLEY) 500 mg calcium (1,250 mg) chewable tablet, Take 1 tablet by mouth once daily., Disp: , Rfl:     diazePAM (VALIUM) 5 MG tablet, Take 1 tablet (5 mg total) by mouth every 6 (six) hours as needed (muscle spasm)., Disp: 4 tablet, Rfl: 0    esomeprazole (NEXIUM) 40 MG capsule, Take 1 capsule (40 mg total) by mouth 2 (two) times daily., Disp: 14 capsule, Rfl: 0    hydrOXYzine HCl (ATARAX) 25 MG tablet, Take 1 tablet (25 mg total) by mouth nightly as needed (insomnia)., Disp: 30 tablet, Rfl: 2    mometasone (NASONEX) 50 mcg/actuation nasal spray, 2 sprays by Nasal route daily as needed (allergies)., Disp: 17 g, Rfl: 0    ondansetron (ZOFRAN-ODT) 4 MG TbDL, Take 1 tablet (4 mg total) by mouth every 8 (eight) hours as needed., Disp: 12 tablet, Rfl: 0    ALLERGIES:  Patient has no known allergies.    EXAM:  See also the handwritten notes/diagrams scanned to chart for additional details.  Constitutional  General appearance: well-developed, well-nourished, well-kempt older white female    Eyes  Inspection of conjunctivae and lids reveals no abnormalities; sclerae anicteric  Neurologic/Psychiatric  Alert,  normal orientation to time, place, person  Normal mood and affect with no evidence of depression, anxiety, agitation  Skin: see photo(s)  Head: background moderate solar damage to exposed areas of skin; in addition, inspection/palpation reveals an ill-defined, approximately 3-4 mm depression on the left nose in the anterior alar groove; site(s) confirmed by reference to the photograph(s) in the chart taken at the time of the biopsy/biopsies by the referring physician and she confirmed this as the site of the prior biopsy  Neck: examination reveals moderate chronic solar  damage  Right upper extremity: examination reveals moderate chronic solar damage to the hand  Left upper extremity: examination reveals moderate chronic solar damage to the hand        ASSESSMENT: biopsy-proven basal cell carcinoma of the left lower nose  chronic solar damage to areas as noted above    PLAN:  The diagnosis and management options, and risks and benefits of the alternatives, including observation/non-treatment, radiation treatment, excision with vertical frozen section or paraffin-embedded section margin evaluation, and Mohs' Micrographic Surgery, Fresh Tissue Technique, were discussed at length with the patient. In particular, the discussion included, but was not limited to, the following:    One alternative at this point would be to defer further treatment and observe the lesion. With small skin cancers of this kind, it is possible that a biopsy can be sufficient to definitively treat a small skin cancer of this kind. Alternatively, some skin cancers are slow growing and do not require immediate treatment. The potential advantage of this choice would be to avoid the need for possibly unnecessary additional surgery. Among the potential disadvantages of this would be the possibility of enlargement of the lesion, more extensive spread of the lesion or recurrence at a later date, which might necessitate a larger and more complex surgery.    Radiation treatment can be an effective treatment for this type of skin cancer. The usual course of treatment is every weekday for several weeks. Local irritation will result from treatment, although no systemic side effects are expected. The potential advantage of radiation treatment is that it avoids the need for surgery. Among the disadvantages of radiation treatment are the length of treatment, the local inflammatory response, the absence of pathologic confirmation of the removal of the skin cancer, a possible increased risk of additional skin cancer in the  treated area in later years, and a somewhat increased risk of recurrence at a later date.     Excisional surgery can be an effective treatment for this type of skin cancer. This would involve excision of the lesion with margin evaluation by submitting the specimen to a pathologist for either immediate marginal assessment via frozen section processing, or delayed marginal assessment by fixed-tissue processing. The potential advantage of this technique is that it offers a way of treating the lesion with some degree of histologic confirmation of tumor removal. Among the disadvantages of this treatment are the possible need for re-excision if marginal involvement is identified, a somewhat greater likelihood of recurrence as compared to Mohs' surgery because of the less comprehensive margin evaluation inherent in the technique, and the general potential risks of surgery, including allergic reactions to the anesthetic and other materials used, infection, injury to nerves in the area with consequent loss of sensation or muscle function, and scarring or distortion of surrounding structures.    Mohs' surgery is a very effective treatment for this type of skin cancer. The potential advantage of Mohs' surgery is that this technique offers the greatest possible certainty of knowing that the skin cancer has been completely removed, with the removal of the least amount of normal tissue. The potential disadvantages of Mohs' surgery include the duration of the surgery, the possible need for a separate surgery for reconstruction following tumor removal, and scarring as a result. In addition, general potential risks of surgery as noted above also apply to treatment via Mohs' surgery.    In light of the nature of this tumor and the location on the nose in an area of increased risk of recurrence,  Mohs' micrographic surgery was thought to be the most appropriate management choice, and this diagnosis is appropriate for treatment by  Mohs' micrographic surgery.     Sufficient time was available for questions, and all questions were answered to her satisfaction. She fully understands the aims, risks, alternatives, and possible complications, and has elected to proceed with the surgery, and verbally consented to do so. The procedure will be scheduled in the near future.    Routine pre-op instructions were given to her.  --------------------------------------  Note: Some or all of this note may have been generated using voice recognition software. There may be voice recognition errors including grammatical and/or spelling errors found in the text. Attempts were made to correct these errors prior to signature.

## 2018-11-28 NOTE — LETTER
November 29, 2018      Sarah Sheridan MD  [address] [address]           [facility]  [address] [facility] [address] [place] [address]  Phone: [phone]          Patient: Bing Burch   MR Number: [id number]   YOB: 1960   Date of Visit: 11/28/2018       Dear Dr. Sarah Sheridan:    Thank you for referring Bing Burch to me for evaluation. Attached you will find relevant portions of my assessment and plan of care.    If you have questions, please do not hesitate to call me. I look forward to following Bing Burch along with you.    Sincerely,    Keith Macario MD    Enclosure  CC:  No Recipients    If you would like to receive this communication electronically, please contact [email or web address] or [phone] to request more information on [employer] Link access.    For providers and/or their staff who would like to refer a patient to [facility], please contact us through our one-stop-shop provider referral line, [facility], at [phone].    If you feel you have received this communication in error or would no longer like to receive these types of communications, please e-mail [email or web address]

## 2018-12-06 ENCOUNTER — OFFICE VISIT (OUTPATIENT)
Dept: FAMILY MEDICINE | Facility: CLINIC | Age: 58
End: 2018-12-06
Payer: COMMERCIAL

## 2018-12-06 VITALS
HEART RATE: 86 BPM | DIASTOLIC BLOOD PRESSURE: 86 MMHG | BODY MASS INDEX: 31.96 KG/M2 | SYSTOLIC BLOOD PRESSURE: 126 MMHG | HEIGHT: 66 IN | TEMPERATURE: 98 F | WEIGHT: 198.88 LBS

## 2018-12-06 DIAGNOSIS — M50.90 CERVICAL DISC DISORDER: ICD-10-CM

## 2018-12-06 DIAGNOSIS — M54.12 CERVICAL RADICULAR PAIN: Primary | ICD-10-CM

## 2018-12-06 PROCEDURE — 3008F BODY MASS INDEX DOCD: CPT | Mod: CPTII,S$GLB,, | Performed by: PHYSICIAN ASSISTANT

## 2018-12-06 PROCEDURE — 99999 PR PBB SHADOW E&M-EST. PATIENT-LVL III: CPT | Mod: PBBFAC,,, | Performed by: PHYSICIAN ASSISTANT

## 2018-12-06 PROCEDURE — 99213 OFFICE O/P EST LOW 20 MIN: CPT | Mod: S$GLB,,, | Performed by: PHYSICIAN ASSISTANT

## 2018-12-06 RX ORDER — METAXALONE 800 MG/1
800 TABLET ORAL 3 TIMES DAILY
COMMUNITY
End: 2018-12-06

## 2018-12-06 RX ORDER — METHYLPREDNISOLONE 4 MG/1
TABLET ORAL
Qty: 1 PACKAGE | Refills: 0 | Status: SHIPPED | OUTPATIENT
Start: 2018-12-06 | End: 2018-12-27

## 2018-12-06 RX ORDER — GABAPENTIN 300 MG/1
300 CAPSULE ORAL NIGHTLY
Qty: 30 CAPSULE | Refills: 2 | Status: SHIPPED | OUTPATIENT
Start: 2018-12-06 | End: 2019-03-13

## 2018-12-06 NOTE — PROGRESS NOTES
Subjective:       Patient ID: Bing Burch is a 58 y.o. female.    Chief Complaint: Shoulder Pain (right )    Patient with history of cervical disc disease presents for evaluation of neck pain radiating into the right shoulder arm and hand.  She states that the upper arm has a numb feeling.  She she has a tingling sensation in the right hand.  She denies weakness of the upper extremity.  Pain began approximately 2 months ago.  Pain has worsened since onset.  She has tried Skelaxin, ibuprofen, and Tylenol without relief.  Pain is causing her to lose sleep at nighttime as she cannot get comfortable.       Neck Pain    This is a new problem. The current episode started more than 1 month ago. The problem occurs constantly. The problem has been gradually worsening. The pain is associated with nothing. The pain is present in the right side. The quality of the pain is described as shooting. The pain is at a severity of 8/10. The pain is severe. The symptoms are aggravated by twisting, bending and position. Associated symptoms include numbness and tingling. Pertinent negatives include no chest pain, headaches, paresis, trouble swallowing or weakness. She has tried NSAIDs, muscle relaxants and acetaminophen for the symptoms. The treatment provided no relief.     Review of Systems   Constitutional: Negative for activity change and unexpected weight change.   HENT: Negative for hearing loss, rhinorrhea and trouble swallowing.    Eyes: Negative for visual disturbance.   Respiratory: Negative for chest tightness and wheezing.    Cardiovascular: Negative for chest pain and palpitations.   Gastrointestinal: Negative for blood in stool, constipation, diarrhea and vomiting.   Endocrine: Negative for polydipsia and polyuria.   Genitourinary: Negative for difficulty urinating, dysuria, hematuria and menstrual problem.   Musculoskeletal: Positive for arthralgias, myalgias and neck pain. Negative for joint swelling and neck  stiffness.   Neurological: Positive for tingling and numbness. Negative for weakness and headaches.   Psychiatric/Behavioral: Negative for confusion and dysphoric mood.       Objective:      Physical Exam   Constitutional: She appears well-developed and well-nourished. No distress.   HENT:   Head: Normocephalic and atraumatic.   Cardiovascular: Normal rate, regular rhythm and normal heart sounds.   Pulmonary/Chest: Effort normal and breath sounds normal.   Musculoskeletal:        Right shoulder: She exhibits tenderness (Trapezius) and pain. She exhibits normal range of motion, no swelling, no effusion, no deformity, normal pulse and normal strength.        Right elbow: She exhibits normal range of motion. No tenderness found.        Right wrist: She exhibits normal range of motion.        Cervical back: She exhibits tenderness (Cervical paraspinous). She exhibits normal range of motion.        Right hand: She exhibits normal range of motion, no tenderness and normal capillary refill. Decreased sensation noted. Normal strength noted.   Skin: Skin is warm and dry. Capillary refill takes less than 2 seconds.   Vitals reviewed.      Assessment:       1. Cervical radicular pain    2. Cervical disc disorder        Plan:     Bing was seen today for shoulder pain.    Diagnoses and all orders for this visit:    Cervical radicular pain    Cervical disc disorder  -     MRI Cervical Spine Without Contrast; Future    Other orders  -     methylPREDNISolone (MEDROL DOSEPACK) 4 mg tablet; use as directed  -     gabapentin (NEURONTIN) 300 MG capsule; Take 1 capsule (300 mg total) by mouth every evening.

## 2018-12-12 ENCOUNTER — HOSPITAL ENCOUNTER (OUTPATIENT)
Dept: RADIOLOGY | Facility: HOSPITAL | Age: 58
Discharge: HOME | End: 2018-12-12
Attending: PHYSICIAN ASSISTANT
Payer: COMMERCIAL

## 2018-12-12 DIAGNOSIS — M54.12 CERVICAL RADICULOPATHY: Primary | ICD-10-CM

## 2018-12-12 DIAGNOSIS — M54.12 CERVICAL RADICULAR PAIN: ICD-10-CM

## 2018-12-12 DIAGNOSIS — M50.90 CERVICAL DISC DISORDER: ICD-10-CM

## 2018-12-12 PROCEDURE — 72141 MRI NECK SPINE W/O DYE: CPT | Mod: TC

## 2018-12-12 PROCEDURE — 72141 MRI NECK SPINE W/O DYE: CPT | Mod: 26,,, | Performed by: RADIOLOGY

## 2018-12-14 ENCOUNTER — OFFICE VISIT (OUTPATIENT)
Dept: SPINE | Facility: CLINIC | Age: 58
End: 2018-12-14
Payer: COMMERCIAL

## 2018-12-14 DIAGNOSIS — M54.12 CERVICAL RADICULITIS: Primary | ICD-10-CM

## 2018-12-14 PROCEDURE — 99204 OFFICE O/P NEW MOD 45 MIN: CPT | Mod: ,,, | Performed by: PHYSICAL MEDICINE & REHABILITATION

## 2018-12-14 NOTE — LETTER
December 14, 2018      BRIGID Lantigua  [address] [place] [address]           [facility]  [address] [place] [address] [address]  [place] [address]  Phone: [phone]  Fax: [phone]          Patient: Bing Burch   MR Number: [id number]   YOB: 1960   Date of Visit: 12/14/2018       Dear Sarah Van:    Thank you for referring Bing Burch to me for evaluation. Attached you will find relevant portions of my assessment and plan of care.    If you have questions, please do not hesitate to call me. I look forward to following Bing Burch along with you.    Sincerely,    Braden Morales MD    Enclosure  CC:  No Recipients    If you would like to receive this communication electronically, please contact [email or web address] or [phone] to request more information on [employer] Link access.    For providers and/or their staff who would like to refer a patient to [facility], please contact us through our one-stop-shop provider referral line, [facility] [place], at [phone].    If you feel you have received this communication in error or would no longer like to receive these types of communications, please e-mail [email or web address]

## 2018-12-14 NOTE — PROGRESS NOTES
SUBJECTIVE:    Patient ID: Bing Burch is a 58 y.o. female.    Chief Complaint: No chief complaint on file.    This is a 58-year-old woman who sees Sarah RAINEY for her primary care.  She has no chronic major medical problems.  No history of neck pain.  For 2 months she has been having posterior neck discomfort radiating into the shoulders on both sides and for about 2 weeks she has been having right arm discomfort mostly toward the lateral portion of the right arm heading toward the right thumb.  No associated weakness bowel or bladder dysfunction fever chills sweats or unexpected weight loss.  She saw her primary care provider on 12/06/2018 and was prescribed Medrol Dosepak and Neurontin which helped some.  Current pain level is about 5/10.  MRI of the cervical spine shows multilevel degenerative disc disease mostly C4-5 C5-6 and C6-7 with moderate to marked neural foraminal stenosis most at C4-5 and C5-6.  She also had a right-sided perineural cyst at C6-7    1. There is multilevel degenerative change consisting of some degree of disc osteophyte complex, uncovertebral spurring and facet joint arthropathy.  There is some degree of spinal canal and foraminal stenosis but there is no cord compression.  2. There is no spinal canal or foraminal stenosis at the C2-3 or C7-T1 levels.  3. At the C3-4 level there is mild-to-moderate left foraminal stenosis.  4. At the C4-5 level there is moderate-to-marked bilateral, left greater than right, foraminal stenosis with mild spinal stenosis.  5. At the C5-6 level there is moderate-to-marked left lateral recess and foraminal stenosis as well as moderate right foraminal stenosis.  There is borderline to mild spinal stenosis.  6. At the C6-7 level there is borderline to mild spinal stenosis with mild left foraminal stenosis.          Past Medical History:   Diagnosis Date    GERD (gastroesophageal reflux disease)     Irritable bowel syndrome     mixed type,  with slightly predominant constipation     Social History     Socioeconomic History    Marital status:      Spouse name: Not on file    Number of children: Not on file    Years of education: Not on file    Highest education level: Not on file   Social Needs    Financial resource strain: Not on file    Food insecurity - worry: Not on file    Food insecurity - inability: Not on file    Transportation needs - medical: Not on file    Transportation needs - non-medical: Not on file   Occupational History    Not on file   Tobacco Use    Smoking status: Former Smoker     Last attempt to quit: 1989     Years since quittin.2    Smokeless tobacco: Never Used   Substance and Sexual Activity    Alcohol use: Yes     Comment: occasionally    Drug use: No    Sexual activity: Not Currently   Other Topics Concern    Are you pregnant or think you may be? Not Asked    Breast-feeding Not Asked   Social History Narrative    Not on file     Past Surgical History:   Procedure Laterality Date    ESOPHAGOGASTRODUODENOSCOPY (EGD) N/A 2017    [facility] ENDO    HERNIA REPAIR      hiatal hernia repair in 2013      SINUS SURGERY      TONSILLECTOMY      TUMOR EXCISION       Family History   Problem Relation Age of Onset    Psoriasis Brother     Breast cancer Mother 55    Eczema Neg Hx     Lupus Neg Hx     Melanoma Neg Hx      There were no vitals filed for this visit.    Review of Systems   Constitutional: Negative for chills, diaphoresis, fatigue, fever and unexpected weight change.   HENT: Negative for trouble swallowing.    Eyes: Negative for visual disturbance.   Respiratory: Negative for shortness of breath.    Cardiovascular: Negative for chest pain.   Gastrointestinal: Negative for abdominal pain, constipation, nausea and vomiting.   Genitourinary: Negative for difficulty urinating.   Musculoskeletal: Negative for arthralgias, back pain, gait problem, joint  swelling, myalgias, neck pain and neck stiffness.   Neurological: Negative for dizziness, speech difficulty, weakness, light-headedness, numbness and headaches.          Objective:      Physical Exam   Constitutional: She is oriented to person, place, and time. She appears well-developed and well-nourished.   Neurological: She is alert and oriented to person, place, and time.   She is awake and in no acute distress  No point tenderness or palpable masses about the cervical spine  Cervical range of motion is within normal limits albeit with some discomfort at the endpoints of her range particularly in rotation to the right and to the left knee  Reflexes- +1-+2 reflexes at the following:   C5-Biceps   C6-Brachioradialis   C7-Triceps   L3/4-Patellar   S1-Achilles  Cassandra sign is negative bilaterally  There is no clonus  Strength testing- 5/5 strength in the following muscle groups:  C5-Elbow flexion  C6-Wrist extension  C7-Elbow extension  C8-Finger flexion  T1-Finger abduction  L2-Hip flexion  L3-Knee extension  L4-Ankle dorsiflexion  L5-Great toe extension  S1-Ankle plantar flexion               Assessment:       1. Cervical radiculitis           Plan:     she has a nonfocal examination from a neurological standpoint and no historical red flags.  She has symptoms of cervical radiculitis with no evidence of nerve root dysfunction.  Treatment options include living with her pain, physical therapy, epidural steroid injections or surgical intervention.  At this point she opts for physical therapy.  She can continue with her Neurontin.  She asked me about the perineural cyst and as of right now I am sure not sure of the significance of that.  She is not clearly having C7 radicular symptoms.  I will see her back after she completes therapy.  Consider interlaminar epidural steroid injection      Cervical radiculitis  -     Ambulatory Referral to Physical/Occupational Therapy

## 2019-01-06 ENCOUNTER — CLINICAL SUPPORT (OUTPATIENT)
Dept: URGENT CARE | Facility: CLINIC | Age: 59
End: 2019-01-06
Payer: COMMERCIAL

## 2019-01-06 ENCOUNTER — NURSE TRIAGE (OUTPATIENT)
Dept: ADMINISTRATIVE | Facility: CLINIC | Age: 59
End: 2019-01-06

## 2019-01-06 VITALS
WEIGHT: 199.63 LBS | BODY MASS INDEX: 32.08 KG/M2 | RESPIRATION RATE: 16 BRPM | TEMPERATURE: 99 F | DIASTOLIC BLOOD PRESSURE: 88 MMHG | HEIGHT: 66 IN | HEART RATE: 108 BPM | OXYGEN SATURATION: 97 % | SYSTOLIC BLOOD PRESSURE: 146 MMHG

## 2019-01-06 DIAGNOSIS — J32.9 SINUSITIS, UNSPECIFIED CHRONICITY, UNSPECIFIED LOCATION: ICD-10-CM

## 2019-01-06 DIAGNOSIS — J06.9 UPPER RESPIRATORY TRACT INFECTION, UNSPECIFIED TYPE: Primary | ICD-10-CM

## 2019-01-06 PROCEDURE — 96372 PR INJECTION,THERAP/PROPH/DIAG2ST, IM OR SUBCUT: ICD-10-PCS | Mod: S$GLB,,, | Performed by: NURSE PRACTITIONER

## 2019-01-06 PROCEDURE — 99214 PR OFFICE/OUTPT VISIT, EST, LEVL IV, 30-39 MIN: ICD-10-PCS | Mod: 25,S$GLB,, | Performed by: NURSE PRACTITIONER

## 2019-01-06 PROCEDURE — 96372 THER/PROPH/DIAG INJ SC/IM: CPT | Mod: S$GLB,,, | Performed by: NURSE PRACTITIONER

## 2019-01-06 PROCEDURE — 99214 OFFICE O/P EST MOD 30 MIN: CPT | Mod: 25,S$GLB,, | Performed by: NURSE PRACTITIONER

## 2019-01-06 RX ORDER — AZITHROMYCIN 250 MG/1
TABLET, FILM COATED ORAL
Qty: 6 TABLET | Refills: 0 | Status: SHIPPED | OUTPATIENT
Start: 2019-01-06 | End: 2019-03-13

## 2019-01-06 RX ORDER — BENZONATATE 100 MG/1
100 CAPSULE ORAL EVERY 6 HOURS PRN
Qty: 30 CAPSULE | Refills: 1 | Status: SHIPPED | OUTPATIENT
Start: 2019-01-06 | End: 2019-03-13

## 2019-01-06 RX ORDER — PROMETHAZINE HYDROCHLORIDE AND DEXTROMETHORPHAN HYDROBROMIDE 6.25; 15 MG/5ML; MG/5ML
5 SYRUP ORAL EVERY 8 HOURS PRN
Qty: 150 ML | Refills: 0 | Status: SHIPPED | OUTPATIENT
Start: 2019-01-06 | End: 2019-01-16

## 2019-01-06 RX ORDER — DEXAMETHASONE SODIUM PHOSPHATE 4 MG/ML
8 INJECTION, SOLUTION INTRA-ARTICULAR; INTRALESIONAL; INTRAMUSCULAR; INTRAVENOUS; SOFT TISSUE
Status: COMPLETED | OUTPATIENT
Start: 2019-01-06 | End: 2019-01-06

## 2019-01-06 RX ADMIN — DEXAMETHASONE SODIUM PHOSPHATE 8 MG: 4 INJECTION, SOLUTION INTRA-ARTICULAR; INTRALESIONAL; INTRAMUSCULAR; INTRAVENOUS; SOFT TISSUE at 02:01

## 2019-01-06 NOTE — PATIENT INSTRUCTIONS
Viral Upper Respiratory Illness (Adult)  You have a viral upper respiratory illness (URI), which is another term for the common cold. This illness is contagious during the first few days. It is spread through the air by coughing and sneezing. It may also be spread by direct contact (touching the sick person and then touching your own eyes, nose, or mouth). Frequent handwashing will decrease risk of spread. Most viral illnesses go away within 7 to 10 days with rest and simple home remedies. Sometimes the illness may last for several weeks. Antibiotics will not kill a virus, and they are generally not prescribed for this condition.    Home care  · If symptoms are severe, rest at home for the first 2 to 3 days. When you resume activity, don't let yourself get too tired.  · Avoid being exposed to cigarette smoke (yours or others).  · You may use acetaminophen or ibuprofen to control pain and fever, unless another medicine was prescribed. (Note: If you have chronic liver or kidney disease, have ever had a stomach ulcer or gastrointestinal bleeding, or are taking blood-thinning medicines, talk with your healthcare provider before using these medicines.) Aspirin should never be given to anyone under 18 years of age who is ill with a viral infection or fever. It may cause severe liver or brain damage.  · Your appetite may be poor, so a light diet is fine. Avoid dehydration by drinking 6 to 8 glasses of fluids per day (water, soft drinks, juices, tea, or soup). Extra fluids will help loosen secretions in the nose and lungs.  · Over-the-counter cold medicines will not shorten the length of time youre sick, but they may be helpful for the following symptoms: cough, sore throat, and nasal and sinus congestion. (Note: Do not use decongestants if you have high blood pressure.)  Follow-up care  Follow up with your healthcare provider, or as advised.  When to seek medical advice  Call your healthcare provider right away if any  of these occur:  · Cough with lots of colored sputum (mucus)  · Severe headache; face, neck, or ear pain  · Difficulty swallowing due to throat pain  · Fever of 100.4°F (38°C)  Call 911, or get immediate medical care  Call emergency services right away if any of these occur:  · Chest pain, shortness of breath, wheezing, or difficulty breathing  · Coughing up blood  · Inability to swallow due to throat pain  Date Last Reviewed: 9/13/2015  © 9642-4823 [employer]. [address] [place] [address]. All rights reserved. This information is not intended as a substitute for professional medical care. Always follow your healthcare professional's instructions.

## 2019-01-06 NOTE — TELEPHONE ENCOUNTER
Reason for Disposition   [1] Using nasal washes and pain medicine > 24 hours AND [2] sinus pain (lower forehead, cheekbone, or eye) persists    Protocols used:  COMMON COLD-A-    Patient has been having cold symptoms and a headache behind her eyes for over 7 days and blowing out green and bloody mucous. Her fever is gone but she is still achy and congested despite taking over the counter Thera Flu for 3 days or more. She was advised to go to her pharmacy and ask if the flonase is contraindicated with any of the medicine she is taking. Also, she was given information about the nearest urgent care center to her home. Ms. Burch verbalized understanding.

## 2019-01-06 NOTE — PROGRESS NOTES
"Subjective:       Patient ID: Bing Burch is a 58 y.o. female.    Vitals:  height is 5' 6" (1.676 m) and weight is 90.5 kg (199 lb 9.6 oz). Her temperature is 99.4 °F (37.4 °C). Her blood pressure is 146/88 (abnormal) and her pulse is 108. Her respiration is 16 and oxygen saturation is 97%.     Chief Complaint: Sinus Problem    Productive cough at first and last few night was very dry.       Sinus Problem   This is a new problem. The current episode started in the past 7 days. The problem is unchanged. There has been no fever. Associated symptoms include chills, congestion, coughing and sinus pressure. (Body aches, fatigue ) Treatments tried: Theraflu. The treatment provided no relief.       Constitution: Positive for chills.   HENT: Positive for congestion and sinus pressure.    Respiratory: Positive for cough.    Gastrointestinal: Negative for abdominal pain.   Genitourinary: Negative for dysuria.       Objective:      Physical Exam   Constitutional: She is oriented to person, place, and time. She appears well-developed and well-nourished.   HENT:   Mouth/Throat: Oropharynx is clear and moist.   B TM with clear effusion    Oropharynx erythematous, no exudates or PTA   Eyes: Conjunctivae are normal.   Neck: Normal range of motion.   Cardiovascular: Normal rate and regular rhythm.   Pulmonary/Chest: Effort normal and breath sounds normal. She has no wheezes.   Neurological: She is alert and oriented to person, place, and time.   Skin: Skin is warm.   Psychiatric: She has a normal mood and affect. Her behavior is normal.   Nursing note and vitals reviewed.      Assessment:       1. Upper respiratory tract infection, unspecified type    2. Sinusitis, unspecified chronicity, unspecified location        Plan:         Upper respiratory tract infection, unspecified type    Sinusitis, unspecified chronicity, unspecified location    Other orders  -     dexamethasone injection 8 mg  -     benzonatate (TESSALON " PERLES) 100 MG capsule; Take 1 capsule (100 mg total) by mouth every 6 (six) hours as needed for Cough.  Dispense: 30 capsule; Refill: 1  -     promethazine-dextromethorphan (PROMETHAZINE-DM) 6.25-15 mg/5 mL Syrp; Take 5 mLs by mouth every 8 (eight) hours as needed (if unrelieved by tessalon or at night as it may make you sleepy).  Dispense: 150 mL; Refill: 0  -     azithromycin (ZITHROMAX Z-PATRICIA) 250 MG tablet; As directed  Dispense: 6 tablet; Refill: 0    Pt with over one week of increasing sinusitis complaints worsening in severity. Advised OTC treatment in addition to prescribed medications.

## 2019-01-07 ENCOUNTER — CLINICAL SUPPORT (OUTPATIENT)
Dept: REHABILITATION | Facility: HOSPITAL | Age: 59
End: 2019-01-07
Payer: COMMERCIAL

## 2019-01-07 DIAGNOSIS — M54.12 CERVICAL RADICULITIS: ICD-10-CM

## 2019-01-07 PROCEDURE — 97161 PT EVAL LOW COMPLEX 20 MIN: CPT | Mod: PN | Performed by: PHYSICAL THERAPIST

## 2019-01-07 PROCEDURE — 97012 MECHANICAL TRACTION THERAPY: CPT | Mod: PN | Performed by: PHYSICAL THERAPIST

## 2019-01-07 PROCEDURE — 97140 MANUAL THERAPY 1/> REGIONS: CPT | Mod: PN | Performed by: PHYSICAL THERAPIST

## 2019-01-07 NOTE — PATIENT INSTRUCTIONS
Flexibility: Corner Stretch        Standing in corner with hands just above shoulder level and feet __?__ inches from corner, lean forward until a comfortable stretch is felt across chest. Hold __10__ seconds.  Repeat __10__ times per set. Do __1__ sets per session. Do __1__ sessions per day.     [email or web address]     Copyright © [employer] All rights reserved.   Strengthening: Shoulder Shrug (Phase 1)        Shrug shoulders up and down, forward and backward.  Repeat __10__ times per set. Do __3__ sets per session. Do __1__ sessions per day.     [email or web address]     Copyright © [employer] All rights reserved.   Flexibility: Upper Trapezius Stretch        Gently grasp right side of head while reaching behind back with other hand. Tilt head away until a gentle stretch is felt. Hold __10__ seconds.  Repeat __10__ times per set. Do __1__ sets per session. Do __1__ sessions per day.     [email or web address]     Copyright © [employer] All rights reserved.   Scapular Retraction: Elbow Flexion (Standing)        With elbows bent to 90°, pinch shoulder blades together and rotate arms out, keeping elbows bent.  Repeat __10__ times per set. Do __3__ sets per session. Do __1__ sessions per day.     [email or web address]     Copyright © [employer] All rights reserved.

## 2019-01-07 NOTE — PLAN OF CARE
TIME RECORD    Date: 01/07/2019    Start Time:  1400  Stop Time:  1500    PROCEDURES:    TIMED  Procedure Time Min.    Start:  Stop:     Start:  Stop:     Start:  Stop:     Start:  Stop:          UNTIMED  Procedure Time Min.    Start:  Stop:     Start:  Stop:      Total Timed Minutes:  30  Total Timed Units:  2  Total Untimed Units:  1  Charges Billed/# of units:  3    OUTPATIENT PHYSICAL THERAPY   PATIENT EVALUATION  Onset Date: 2 months ago (11/2018)  Primary Diagnosis:   1. Cervical radiculitis       Treatment Diagnosis: Cervical pain  Past Medical History:   Diagnosis Date    GERD (gastroesophageal reflux disease)     Irritable bowel syndrome     mixed type, with slightly predominant constipation     Precautions: Standard  Prior Therapy: None  Medications: Bing Burch has a current medication list which includes the following prescription(s): azithromycin, benzonatate, bupropion, calcium carbonate, diazepam, esomeprazole, gabapentin, hydroxyzine hcl, mometasone, ondansetron, and promethazine-dextromethorphan.  Nutrition:  Overweight  History of Present Illness: The patient present to PT with c/o bilateral cervical and upper trap pain. She also reports tightness in the right UE intermittently   Prior Level of Function: Independent  Social History: The patient is a retiring   Place of Residence (Steps/Adaptations): 2 story on a slab  Functional Deficits Leading to Referral/Nature of Injury: Decreased ability to perform ADL  Patient Therapy Goals: Return to PLOF and decrease pain    Subjective     Bing Burch states she has bilateral upper trap pain with the right greater than the left.    Pain:  Location: Cervical and upper trap region   Description: Tight and stabbing  Activities Which Increase Pain: forward shoulder movements  Activities Which Decrease Pain: rest  Pain Scale: 0/10 at best 2/10 now  10/10 at worst    Objective     Posture: Decreased lumbar lordosis and forward  head in standing  Palpation: Moderate point tenderness noted with palpation of the right upper trap region  Sensation: Intact  Range of Motion/Strength:      CROM:  Flexion   48*  Extension  50*  R side bend  24*  L side bend  30*  R rotation  42*  L rotation  58*    MMT:   Left  Right  Shoulder flexion 5/5  5/5   Shoulder abduction 5/5  5/5  Shoulder ER  5/5  5/5  Scapular retraction 5/5  5/5  Elbow flexion  5/5  5/5  Elbow extension 5/5  5/5  Wrist flexion  5/5  5/5  Wrist extension 5/5  5/5     Strength  Right  Left  Hira Dynamometer  48#  50#   @ #2    Special Tests:    Compression - Positive for pain on the right with symptoms into the right upper trap region  Quadrant- Positive for pain on right        Treatment:     Mulligan mobilization with movement C6/C7 with right rotation and OP x 10    Patient received intermittent cervical traction x 15 min at 13# max/ 8# min, 30 sec on/ 10 sec off using 3 steps    Instruction in HEP consisting of:  Anterior chest wall stretch 10 x 10 sec  Upper trap stretch 10 x 10 sec  Shoulder shrugs 3 x 10  Scapular retraction 3 x 10    Assessment       Initial Assessment (Pertinent finding, problem list and factors affecting outcome):   1. Cervical pain  2. Decreased CROM  3, Poor sitting posture    Rehab Potiential: good    Short Term Goals (3 Weeks):   1. Patient will be begin a written HEP  2. Decrease soft tissue tenderness to mild  3. Decrease pain at worst to 5/10     Long Term Goals (6 Weeks):   1. Patient will be independent with a written HEP  2. Eliminate soft tissue tenderness  3. Decrease pain at worst to 2/10  4. Increase CROM to WNL    Plan     Certification Period: 1/7/2019 to 2/22/2019  Recommended Treatment Plan: 2 times per week for 6 weeks: Cervical/Lumbar Traction, Manual Therapy, Moist Heat/ Ice, Patient Education, Therapeutic Activites, Therapeutic Exercise, Ultrasound and Dry needling  Other Recommendations: Posterior shoulder strengthening, posture  education    Thank you for this referral!        Therapist: MELIDA Figueroa CERTIFY THE NEED FOR THESE SERVICES FURNISHED UNDER THIS PLAN OF TREATMENT AND WHILE UNDER MY CARE    Physician's comments: ________________________________________________________________________________________________________________________________________________      Physician's Name: ___________________________________

## 2019-01-10 ENCOUNTER — CLINICAL SUPPORT (OUTPATIENT)
Dept: REHABILITATION | Facility: HOSPITAL | Age: 59
End: 2019-01-10
Payer: COMMERCIAL

## 2019-01-10 DIAGNOSIS — M54.12 CERVICAL RADICULITIS: ICD-10-CM

## 2019-01-10 PROCEDURE — 97012 MECHANICAL TRACTION THERAPY: CPT | Mod: PN | Performed by: PHYSICAL THERAPIST

## 2019-01-10 PROCEDURE — 97110 THERAPEUTIC EXERCISES: CPT | Mod: PN | Performed by: PHYSICAL THERAPIST

## 2019-01-10 PROCEDURE — 97140 MANUAL THERAPY 1/> REGIONS: CPT | Mod: PN | Performed by: PHYSICAL THERAPIST

## 2019-01-10 NOTE — PROGRESS NOTES
Name: Bing Burch  [facility] Number: [id number]  Date of Treatment: 01/10/2019   Diagnosis:   Encounter Diagnosis   Name Primary?    Cervical radiculitis        Time in: 1000  Time Out: 1100  Total Treatment Time: 60  Group Time: 0      Subjective:    Bing reports improvement of symptoms.  Patient reports their pain to be 2/10 on a 0-10 scale with 0 being no pain and 10 being the worst pain imaginable.    Objective    Patient received individual therapy to increase strength, endurance, ROM and flexibility with 0 patients with activities as follows:     Bing received therapeutic exercises to develop strength, endurance, ROM and flexibility for 30 minutes including:     Anterior chest wall stretch 10 x 10   UBE 5 min/5 min  Cable column extension 3 x 10 3#  Cable column adduction 3 x 10 3#  Cable column mid row 3 x 10 3#  Cable column ER 3 x 10 3#  Scapular retraction 3 x 10 GTB    Patient received intermittent cervical traction x 15 min at 13# max/ 8# min, 30 sec on/ 10 sec off using 3 steps    The patient received the following manual therapy techniques: STM was applied to the: right upper trap for 10 minutes.     Assessment:       Pt will continue to benefit from skilled PT intervention. Medical Necessity is demonstrated by:  Requires skilled supervision to complete and progress HEP and Weakness.    Patient is making good progress towards established goals.    New/Revised goals: na      Plan:  Continue with established Plan of Care towards PT goals. Possible dry needling

## 2019-01-14 ENCOUNTER — CLINICAL SUPPORT (OUTPATIENT)
Dept: REHABILITATION | Facility: HOSPITAL | Age: 59
End: 2019-01-14
Payer: COMMERCIAL

## 2019-01-14 DIAGNOSIS — M54.12 CERVICAL RADICULITIS: ICD-10-CM

## 2019-01-14 PROCEDURE — 97010 HOT OR COLD PACKS THERAPY: CPT | Mod: PN

## 2019-01-14 PROCEDURE — 97110 THERAPEUTIC EXERCISES: CPT | Mod: PN

## 2019-01-14 PROCEDURE — 97012 MECHANICAL TRACTION THERAPY: CPT | Mod: PN

## 2019-01-14 PROCEDURE — 97140 MANUAL THERAPY 1/> REGIONS: CPT | Mod: PN

## 2019-01-14 NOTE — PROGRESS NOTES
Name: Bing Burch  [facility] Number: [id number]  Date of Treatment: 01/14/2019   Diagnosis:   Encounter Diagnosis   Name Primary?    Cervical radiculitis        Time in: 0853  Time Out: 0955  Total BillableTime: 62      Subjective:    Bing reports doing well today.  Patient reports their pain to be 4/10 on a 0-10 scale with 0 being no pain and 10 being the worst pain imaginable.    Objective    Patient received individual therapy to increase strength, ROM, flexibility and posture with activities as follows:     Bing received therapeutic exercises to develop strength, ROM, flexibility and posture for 34 minutes including:     Cervical traction: 13#/5#, 30/10 sec on/off hold cycle x 15 minutes in conjunction w/P  UBE 5/5  Chin tuck x 30  Corner pec stretch x 10, 10 sec hold  CC: 3# extension, adduction, mid row, ER x 30 each  B ER GTB x 30    MFR to R UT region with therapy bar and ball, skin rolling x 10 minutes.    Written Home Exercises Provided: cont HEP  Pt demo good understanding of the education provided. Bing demonstrated good return demonstration of activities.     Assessment:     Pt reported relief upon completion of therapy.  Pain remained 4/10.  Pt will continue to benefit from skilled PT intervention. Medical Necessity is demonstrated by:  Pain limits function of effected part for some activities, Unable to participate fully in daily activities, Requires skilled supervision to complete and progress HEP and Weakness.    Patient is making good progress towards established goals.      Plan:  Continue with established Plan of Care towards PT goals.

## 2019-01-16 ENCOUNTER — CLINICAL SUPPORT (OUTPATIENT)
Dept: REHABILITATION | Facility: HOSPITAL | Age: 59
End: 2019-01-16
Payer: COMMERCIAL

## 2019-01-16 DIAGNOSIS — M54.12 CERVICAL RADICULITIS: ICD-10-CM

## 2019-01-16 PROCEDURE — 97010 HOT OR COLD PACKS THERAPY: CPT | Mod: PN

## 2019-01-16 PROCEDURE — 97012 MECHANICAL TRACTION THERAPY: CPT | Mod: PN

## 2019-01-16 PROCEDURE — 97110 THERAPEUTIC EXERCISES: CPT | Mod: PN

## 2019-01-16 NOTE — PROGRESS NOTES
"Name: Bing Burch  [facility] Number: [id number]  Date of Treatment: 01/16/2019   Diagnosis:   Encounter Diagnosis   Name Primary?    Cervical radiculitis        Time in: 0856  Time Out: 0957  Total Billable Time: 45      Subjective:    Bing reports "It doesn't feel as bad".  Patient reports their pain to be 3/10 on a 0-10 scale with 0 being no pain and 10 being the worst pain imaginable.    Objective    Patient received individual therapy to increase strength, ROM, flexibility and posture with activities as follows:     Bing received therapeutic exercises to develop strength, ROM, flexibility and posture for 60 minutes including:     Cervical traction: 13#/5# hold/release, 30/10 seconds, 15 minutes in conjunction with MHP to cervical region  UBE 5/5 level 2  UT stretch 3 x 30 sec B  Levator scap stretch 3 x 30 sec B  Chin tucks x 30  B ER w/GTB x 30  Corner pec stretch 10 x 10 sec  CC: 3# extension, adduction, mid rows, ER x 30 each    Written Home Exercises Provided: cont HEP  Pt demo good understanding of the education provided. Bing demonstrated good return demonstration of activities.     Assessment:     Tight cervical musculature noted with cervical stretches.  Good tolerance for activity.  Pt will continue to benefit from skilled PT intervention. Medical Necessity is demonstrated by:  Pain limits function of effected part for some activities, Unable to participate fully in daily activities, Requires skilled supervision to complete and progress HEP and Weakness.    Patient is making good progress towards established goals.      Plan:  Continue with established Plan of Care towards PT goals.   "

## 2019-01-22 ENCOUNTER — CLINICAL SUPPORT (OUTPATIENT)
Dept: REHABILITATION | Facility: HOSPITAL | Age: 59
End: 2019-01-22
Payer: COMMERCIAL

## 2019-01-22 DIAGNOSIS — M54.12 CERVICAL RADICULITIS: ICD-10-CM

## 2019-01-22 PROCEDURE — 97012 MECHANICAL TRACTION THERAPY: CPT | Mod: PN

## 2019-01-22 PROCEDURE — 97110 THERAPEUTIC EXERCISES: CPT | Mod: PN

## 2019-01-22 PROCEDURE — 97010 HOT OR COLD PACKS THERAPY: CPT | Mod: PN

## 2019-01-22 NOTE — PROGRESS NOTES
"Name: Bing Burch  [facility] Number: [id number]  Date of Treatment: 01/22/2019   Diagnosis:   Encounter Diagnosis   Name Primary?    Cervical radiculitis        Time in: 1001  Time Out: 1100  Total Billable Time: 30    Subjective:    Bing reports "It's not so much pain, but tightness".  Patient reports their pain to be 0/10 on a 0-10 scale with 0 being no pain and 10 being the worst pain imaginable.    Objective    Patient received individual therapy to increase strength, flexibility, posture and core stabilization with activities as follows:     Bing received therapeutic exercises to develop strength, flexibility, posture and core stabilization for 44 minutes including:     Cervical traction: 13#/5# hold/release, 30/10 seconds, 15 minutes in conjunction with MHP to cervical region  UBE 5/5 level 2  UT stretch 3 x 30 sec R  Levator scap stretch 3 x 30 sec R  Chin tucks x 30  B ER w/GTB x 30  Corner pec stretch 10 x 10 sec  CC: 3# extension, adduction, mid rows, ER x 30 each    Written Home Exercises Provided: cont HEP  Pt demo good understanding of the education provided. Bing demonstrated good return demonstration of activities.     Assessment:     Decreased cervical ROM with ambulation.  Pain decreasing overall.  Pt will continue to benefit from skilled PT intervention. Medical Necessity is demonstrated by:  Pain limits function of effected part for some activities, Unable to participate fully in daily activities, Requires skilled supervision to complete and progress HEP and Weakness.    Patient is making good progress towards established goals.      Plan:  Continue with established Plan of Care towards PT goals.   "

## 2019-01-24 ENCOUNTER — CLINICAL SUPPORT (OUTPATIENT)
Dept: REHABILITATION | Facility: HOSPITAL | Age: 59
End: 2019-01-24
Payer: COMMERCIAL

## 2019-01-24 DIAGNOSIS — M54.12 CERVICAL RADICULITIS: ICD-10-CM

## 2019-01-24 PROCEDURE — 97110 THERAPEUTIC EXERCISES: CPT | Mod: PN

## 2019-01-24 PROCEDURE — 97140 MANUAL THERAPY 1/> REGIONS: CPT | Mod: PN

## 2019-01-24 PROCEDURE — 97010 HOT OR COLD PACKS THERAPY: CPT | Mod: PN

## 2019-01-24 PROCEDURE — 97012 MECHANICAL TRACTION THERAPY: CPT | Mod: PN

## 2019-01-24 NOTE — PROGRESS NOTES
Name: Bing Burch  [facility] Number: [id number]  Date of Treatment: 01/24/2019   Diagnosis:   Encounter Diagnosis   Name Primary?    Cervical radiculitis        Time in: 0958  Time Out: 1045  Total Billable Time 45      Subjective:    Bing reports increased R shoulder pain and pain near C7 region.  Patient reports their pain to be 5/10 on a 0-10 scale with 0 being no pain and 10 being the worst pain imaginable.    Objective    Patient received individual therapy to increase strength and posture with activities as follows:     Bing received therapeutic exercises to develop strength and flexibility for 15 minutes including:     Cervical traction 13#/5#; 30/10 on/off cycle, x 15 minutes, in conjunction with cervical MHP  Cervical: sb, rotation x 30 B each  Chin tucks x 30  Shoulder retro rolls, squeeze x 30 each    MT to R UT and MT regions x 15 minutes.    Written Home Exercises Provided: cont HEP  Pt demo good understanding of the education provided. Bing demonstrated fair return demonstration of activities.     Assessment:     Pain limited activity level today.   Pt will continue to benefit from skilled PT intervention. Medical Necessity is demonstrated by:  Pain limits function of effected part for some activities, Unable to participate fully in daily activities, Requires skilled supervision to complete and progress HEP and Weakness.    Patient is making fair progress towards established goals.    Care of pt given to CIPRIANO Edwards, PT for dry needling.    Plan:  Continue with established Plan of Care towards PT goals.

## 2019-01-29 ENCOUNTER — CLINICAL SUPPORT (OUTPATIENT)
Dept: REHABILITATION | Facility: HOSPITAL | Age: 59
End: 2019-01-29
Payer: COMMERCIAL

## 2019-01-29 DIAGNOSIS — M54.12 CERVICAL RADICULITIS: ICD-10-CM

## 2019-01-29 PROCEDURE — 97110 THERAPEUTIC EXERCISES: CPT | Mod: PN | Performed by: PHYSICAL THERAPIST

## 2019-01-29 NOTE — PROGRESS NOTES
Name: Bing Burch  [facility] Number: [id number]  Date of Treatment: 01/29/2019   Diagnosis:   Encounter Diagnosis   Name Primary?    Cervical radiculitis        Time in: 900  Time Out: 1000  Total Treatment Time: 60  Group Time: 0      Subjective:    Bing reports decreased pain.  Patient reports their pain to be 2/10 on a 0-10 scale with 0 being no pain and 10 being the worst pain imaginable.    Objective    Patient received individual therapy to increase strength, endurance, ROM, flexibility and posture with 0 patients with activities as follows:     Bing received therapeutic exercises to develop strength, endurance, ROM, flexibility and posture for 45 minutes including:     nterior chest wall stretch 10 x 10   UBE 5 min/5 min  Cable column extension 3 x 10 3#  Cable column adduction 3 x 10 3#  Cable column mid row 3 x 10 3#  Cable column ER 3 x 10 3#  Scapular retraction 3 x 10 GTB  Upper trap stretch 10 x 10 sec B  Standing rhomboid stretch 10 x 10 sec  Chin tucks 3 x 10    Patient received intermittent cervical traction x 15 min at 15# max/ 8# min, 30 sec on/ 10 sec off     Assessment:       Pt will continue to benefit from skilled PT intervention. Medical Necessity is demonstrated by:  Requires skilled supervision to complete and progress HEP and Weakness.    Patient is making good progress towards established goals.    New/Revised goals: na      Plan:  Continue with established Plan of Care towards PT goals.

## 2019-01-30 NOTE — PROGRESS NOTES
ALLERGIES:   Patient has no known allergies.      Current Outpatient Medications:     azithromycin (ZITHROMAX Z-PATRICIA) 250 MG tablet, As directed, Disp: 6 tablet, Rfl: 0    benzonatate (TESSALON PERLES) 100 MG capsule, Take 1 capsule (100 mg total) by mouth every 6 (six) hours as needed for Cough., Disp: 30 capsule, Rfl: 1    buPROPion (WELLBUTRIN XL) 300 MG 24 hr tablet, Take 1 tablet (300 mg total) by mouth once daily., Disp: 30 tablet, Rfl: 5    calcium carbonate (OS-ALLEY) 500 mg calcium (1,250 mg) chewable tablet, Take 1 tablet by mouth once daily., Disp: , Rfl:     diazePAM (VALIUM) 5 MG tablet, Take 1 tablet (5 mg total) by mouth every 6 (six) hours as needed (muscle spasm)., Disp: 4 tablet, Rfl: 0    esomeprazole (NEXIUM) 40 MG capsule, Take 1 capsule (40 mg total) by mouth 2 (two) times daily., Disp: 14 capsule, Rfl: 0    gabapentin (NEURONTIN) 300 MG capsule, Take 1 capsule (300 mg total) by mouth every evening., Disp: 30 capsule, Rfl: 2    hydrOXYzine HCl (ATARAX) 25 MG tablet, Take 1 tablet (25 mg total) by mouth nightly as needed (insomnia)., Disp: 30 tablet, Rfl: 2    mometasone (NASONEX) 50 mcg/actuation nasal spray, 2 sprays by Nasal route daily as needed (allergies)., Disp: 17 g, Rfl: 0    ondansetron (ZOFRAN-ODT) 4 MG TbDL, Take 1 tablet (4 mg total) by mouth every 8 (eight) hours as needed., Disp: 12 tablet, Rfl: 0  -------------------------------------------------------------  PROCEDURE: Mohs' Micrographic Surgery    SITE: left alar groove    INDICATION: basal cell carcinoma in an area at increased risk of recurrence    CASE NUMBER: [id number]      ANESTHETIC: 5 mL 1% Lidocaine with Epinephrine 1:100,000    SURGICAL PREP: Ethanol and Hibiclens    SURGEON: Keith Macario MD    ASSISTANTS: Braden Wang CST     PREOPERATIVE DIAGNOSIS: basal cell carcinoma    POSTOPERATIVE DIAGNOSIS: basal cell carcinoma    PATHOLOGIC DIAGNOSIS: basal cell carcinoma    STAGES OF MOHS' SURGERY PERFORMED:  two    TUMOR-FREE PLANE ACHIEVED: yes    HEMOSTASIS: Hyfrecation     SPECIMENS: two (one in stage A, one in stage B)    INITIAL LESION SIZE: 0.6 x 0.6 cm    FINAL DEFECT SIZE: 0.8 x 1.0 cm    WOUND REPAIR/DISPOSITION: see below    NARRATIVE:    The patient is a 58 y.o.female referred by Sarah Sheridan MD with a history of cancer on the left alar groove which was biopsied - pathology accession #[id number], [facility]. Findings revealed basal cell carcinoma. Examination revealed an ill-defined hypopigmented scar in the left anterior alar groove at the site of prior biopsy, which was confirmed by reference to the photograph taken at the previous patient visit. In light of the nature of this tumor and the location on the nose , Mohs' micrographic surgery was thought to be the most appropriate management choice, and this diagnosis is appropriate for treatment by Mohs' micrographic surgery.  I discussed it with the patient and she fully understands the aims, risks, alternatives, and possible complications, and elects to proceed.  There are no medical or surgical contraindications to the procedure.     A signed informed consent was obtained.    PROCEDURE:  The patient was placed in the semi-recumbent position on the operating table in the Mohs' Surgery Suite. The area in question was thoroughly prepped with ethanol and Hibiclens, with particular care to avoid application to the immediate periocular skin. A sterile surgical marker was used to outline the clinically apparent margins of the involved area, and a narrow margin of normal-appearing skin. Reference marks were made at the periphery of the outlined area with the surgical marker. The proposed area of excision was measured and photographed. Local anesthesia as noted above was administered.  The total volume of anesthetic used throughout this portion of the procedure was as documented above. The area was prepared and draped in the standard manner. All of the  "grossly identifiable area of clinically abnormal tissue and an underlying/peripheral layer was taken and processed by the Mohs' technique.  Hemostasis was obtained with the hyfrecator. Tissue was taken from any areas of residual marginal involvement (if present) and processed by the Mohs' technique in as many stages as needed until a tumor-free plane was achieved.    Colors of inks used in the reference nicks at epidermal margins (if present) and/or inking of non-epithelial edges, if applicable, is represented on the Mohs map as follows: solid lines represent red ink, dots represent blue ink, jagged lines represent black ink, curlicues represent green ink, "xxx" represents yellow ink.    The first Mohs' layer consisted of one section(s) with 6 slide(s) evaluated. Some residual tumor was noted at the margins of the first Mohs' layer. Histology of the specimen(s) showed residual infiltrative basal cell carcinoma, manifested as irregularly-shaped and angulated nests and strands of basaloid cells with hyperchromatic nuclei, peripheral palisading, and artifactual clefting around the nests/strands and some lymphocytic inflammatory infiltrate in the surrounding tissues, in the superficial dermis between the black and green nicks, and, on later cuts, between the red and blue nicks.     The second Mohs' layer consisted of one section(s) with 2 slide(s) evaluated. No residual tumor was noted at the margins of the second Mohs' layer. Histology of the specimen(s) showed chronic solar damage.    A total of two section(s) and 8 slide(s) were examined under the microscope via the Mohs technique.  A cancer free plane was reached after layer number one. Defect final size was as noted above.     The wound was covered with a nonadherent dressing between stages, and the patient allowed to wait in the waiting area during these periods. The final defect was photographed at the completion of the Mohs' procedure.    The patient was " returned to the procedure room following completion of the Mohs' procedure and final slide review. After reviewing the risk and benefits of the alternatives for management of the defect, I have had her see Michael Reddy, the plastic surgeon with whom I collaborate in such circumstances, today; and he has coordinated reconstruction of the wound for next Monday.    Final dressing consisted of petrolatum, Telfa and tape.    Estimated blood loss for the total procedure was less than 5 mL.    Total operative time including tissue processing in the Mohs' laboratory and microscopic Mohs' frozen section slide review was 2 hour(s). Verbal and written wound care instructions were given to the patient, and she expressed understanding of these instructions. The patient tolerated the procedure well and left the operating room in good condition; she is to return PRN to me.    Dr. Macario's cell phone number was given to the patient with instructions to call prn with any problems.

## 2019-01-31 ENCOUNTER — CLINICAL SUPPORT (OUTPATIENT)
Dept: REHABILITATION | Facility: HOSPITAL | Age: 59
End: 2019-01-31
Payer: COMMERCIAL

## 2019-01-31 DIAGNOSIS — M54.12 CERVICAL RADICULITIS: ICD-10-CM

## 2019-01-31 PROCEDURE — 97110 THERAPEUTIC EXERCISES: CPT | Mod: PN | Performed by: PHYSICAL THERAPIST

## 2019-01-31 PROCEDURE — 97012 MECHANICAL TRACTION THERAPY: CPT | Mod: PN | Performed by: PHYSICAL THERAPIST

## 2019-01-31 NOTE — PROGRESS NOTES
Name: Bing Burch  [facility] Number: [id number]  Date of Treatment: 01/31/2019   Diagnosis:   Encounter Diagnosis   Name Primary?    Cervical radiculitis        Time in: 1000  Time Out: 1100  Total Treatment Time: 60  Group Time: 0      Subjective:    Bing reports decreased pain.  Patient reports their pain to be 3/10 on a 0-10 scale with 0 being no pain and 10 being the worst pain imaginable.    Objective    Patient received individual therapy to increase strength, endurance, ROM, flexibility and posture with 0 patients with activities as follows:     Bing received therapeutic exercises to develop strength, endurance, ROM, flexibility and posture for 45 minutes including:     Anterior chest wall stretch 10 x 10   UBE 5 min/5 min  Cable column extension 3 x 10 3#  Cable column adduction 3 x 10 3#  Cable column mid row 3 x 10 3#  Cable column ER 3 x 10 3#  Scapular retraction 3 x 10 GTB  Upper trap stretch 10 x 10 sec B  Standing rhomboid stretch 10 x 10 sec  Chin tucks 3 x 10     Patient received intermittent cervical traction x 15 min at 15# max/ 8# min, 30 sec on/ 10 sec off     Assessment:       Pt will continue to benefit from skilled PT intervention. Medical Necessity is demonstrated by:  Unable to participate fully in daily activities and Requires skilled supervision to complete and progress HEP.    Patient is making good progress towards established goals.    New/Revised goals: na      Plan:  Continue with established Plan of Care towards PT goals.

## 2019-02-01 ENCOUNTER — PROCEDURE VISIT (OUTPATIENT)
Dept: DERMATOLOGY | Facility: CLINIC | Age: 59
End: 2019-02-01
Payer: COMMERCIAL

## 2019-02-01 DIAGNOSIS — Z98.890 MOHS DEFECT OF LEFT NOSE: Primary | ICD-10-CM

## 2019-02-01 DIAGNOSIS — C44.311 BASAL CELL CARCINOMA OF NOSE: Primary | ICD-10-CM

## 2019-02-01 DIAGNOSIS — M95.0 MOHS DEFECT OF LEFT NOSE: Primary | ICD-10-CM

## 2019-02-01 PROCEDURE — 99499 UNLISTED E&M SERVICE: CPT | Mod: S$GLB,,, | Performed by: DERMATOLOGY

## 2019-02-01 PROCEDURE — 17311 MOHS 1 STAGE H/N/HF/G: CPT | Mod: S$GLB,,, | Performed by: DERMATOLOGY

## 2019-02-01 PROCEDURE — 17312: ICD-10-PCS | Mod: S$GLB,,, | Performed by: DERMATOLOGY

## 2019-02-01 PROCEDURE — 17311: ICD-10-PCS | Mod: S$GLB,,, | Performed by: DERMATOLOGY

## 2019-02-01 PROCEDURE — 99499 NO LOS: ICD-10-PCS | Mod: S$GLB,,, | Performed by: DERMATOLOGY

## 2019-02-01 PROCEDURE — 17312 MOHS ADDL STAGE: CPT | Mod: S$GLB,,, | Performed by: DERMATOLOGY

## 2019-02-04 ENCOUNTER — HOSPITAL ENCOUNTER (OUTPATIENT)
Facility: HOSPITAL | Age: 59
Discharge: HOME | End: 2019-02-04
Attending: SURGERY | Admitting: SURGERY
Payer: COMMERCIAL

## 2019-02-04 ENCOUNTER — ANESTHESIA EVENT (OUTPATIENT)
Dept: SURGERY | Facility: HOSPITAL | Age: 59
End: 2019-02-04
Payer: COMMERCIAL

## 2019-02-04 ENCOUNTER — ANESTHESIA (OUTPATIENT)
Dept: SURGERY | Facility: HOSPITAL | Age: 59
End: 2019-02-04
Payer: COMMERCIAL

## 2019-02-04 VITALS
HEIGHT: 66 IN | HEART RATE: 83 BPM | OXYGEN SATURATION: 97 % | BODY MASS INDEX: 32.14 KG/M2 | WEIGHT: 200 LBS | SYSTOLIC BLOOD PRESSURE: 126 MMHG | DIASTOLIC BLOOD PRESSURE: 67 MMHG | TEMPERATURE: 98 F | RESPIRATION RATE: 16 BRPM

## 2019-02-04 DIAGNOSIS — C44.301 SKIN CANCER OF NOSE: Primary | ICD-10-CM

## 2019-02-04 PROCEDURE — 71000015 HC POSTOP RECOV 1ST HR: Performed by: SURGERY

## 2019-02-04 PROCEDURE — 37000008 HC ANESTHESIA 1ST 15 MINUTES: Performed by: SURGERY

## 2019-02-04 PROCEDURE — 36000706: Performed by: SURGERY

## 2019-02-04 PROCEDURE — 25000003 PHARM REV CODE 250: Performed by: SURGERY

## 2019-02-04 PROCEDURE — 63600175 PHARM REV CODE 636 W HCPCS: Performed by: ANESTHESIOLOGY

## 2019-02-04 PROCEDURE — 36000707: Performed by: SURGERY

## 2019-02-04 PROCEDURE — D9220A PRA ANESTHESIA: ICD-10-PCS | Mod: ,,, | Performed by: ANESTHESIOLOGY

## 2019-02-04 PROCEDURE — D9220A PRA ANESTHESIA: Mod: ,,, | Performed by: ANESTHESIOLOGY

## 2019-02-04 PROCEDURE — 37000009 HC ANESTHESIA EA ADD 15 MINS: Performed by: SURGERY

## 2019-02-04 PROCEDURE — 71000033 HC RECOVERY, INTIAL HOUR: Performed by: SURGERY

## 2019-02-04 PROCEDURE — 71000039 HC RECOVERY, EACH ADD'L HOUR: Performed by: SURGERY

## 2019-02-04 PROCEDURE — 94761 N-INVAS EAR/PLS OXIMETRY MLT: CPT

## 2019-02-04 PROCEDURE — 14060 PR ADJ TISS XFER LID,NOS,EAR <10 SQCM: ICD-10-PCS | Mod: ,,, | Performed by: SURGERY

## 2019-02-04 PROCEDURE — 63600175 PHARM REV CODE 636 W HCPCS: Performed by: GENERAL PRACTICE

## 2019-02-04 PROCEDURE — 25000003 PHARM REV CODE 250: Performed by: ANESTHESIOLOGY

## 2019-02-04 PROCEDURE — 14060 TIS TRNFR E/N/E/L 10 SQ CM/<: CPT | Mod: ,,, | Performed by: SURGERY

## 2019-02-04 RX ORDER — BACITRACIN 500 [USP'U]/G
OINTMENT TOPICAL 2 TIMES DAILY
Qty: 14 G | Refills: 0 | Status: SHIPPED | OUTPATIENT
Start: 2019-02-04 | End: 2019-04-11

## 2019-02-04 RX ORDER — OXYCODONE AND ACETAMINOPHEN 5; 325 MG/1; MG/1
1 TABLET ORAL EVERY 4 HOURS PRN
Qty: 21 TABLET | Refills: 0 | Status: SHIPPED | OUTPATIENT
Start: 2019-02-04 | End: 2019-03-13

## 2019-02-04 RX ORDER — HEPARIN SODIUM 5000 [USP'U]/ML
5000 INJECTION, SOLUTION INTRAVENOUS; SUBCUTANEOUS EVERY 8 HOURS
Status: DISCONTINUED | OUTPATIENT
Start: 2019-02-04 | End: 2019-02-04 | Stop reason: HOSPADM

## 2019-02-04 RX ORDER — FENTANYL CITRATE 50 UG/ML
25 INJECTION, SOLUTION INTRAMUSCULAR; INTRAVENOUS EVERY 5 MIN PRN
Status: DISCONTINUED | OUTPATIENT
Start: 2019-02-04 | End: 2019-02-04 | Stop reason: HOSPADM

## 2019-02-04 RX ORDER — SODIUM CHLORIDE 9 MG/ML
INJECTION, SOLUTION INTRAVENOUS CONTINUOUS PRN
Status: DISCONTINUED | OUTPATIENT
Start: 2019-02-04 | End: 2019-02-04

## 2019-02-04 RX ORDER — MIDAZOLAM HYDROCHLORIDE 1 MG/ML
INJECTION, SOLUTION INTRAMUSCULAR; INTRAVENOUS
Status: DISCONTINUED | OUTPATIENT
Start: 2019-02-04 | End: 2019-02-04

## 2019-02-04 RX ORDER — ONDANSETRON 2 MG/ML
4 INJECTION INTRAMUSCULAR; INTRAVENOUS DAILY PRN
Status: DISCONTINUED | OUTPATIENT
Start: 2019-02-04 | End: 2019-02-04 | Stop reason: HOSPADM

## 2019-02-04 RX ORDER — PROPOFOL 10 MG/ML
VIAL (ML) INTRAVENOUS CONTINUOUS PRN
Status: DISCONTINUED | OUTPATIENT
Start: 2019-02-04 | End: 2019-02-04

## 2019-02-04 RX ORDER — MUPIROCIN 20 MG/G
1 OINTMENT TOPICAL 2 TIMES DAILY
Status: DISCONTINUED | OUTPATIENT
Start: 2019-02-04 | End: 2019-02-04 | Stop reason: HOSPADM

## 2019-02-04 RX ORDER — LIDOCAINE HYDROCHLORIDE AND EPINEPHRINE 10; 10 MG/ML; UG/ML
INJECTION, SOLUTION INFILTRATION; PERINEURAL
Status: DISCONTINUED | OUTPATIENT
Start: 2019-02-04 | End: 2019-02-04 | Stop reason: HOSPADM

## 2019-02-04 RX ORDER — PROPOFOL 10 MG/ML
VIAL (ML) INTRAVENOUS
Status: DISCONTINUED | OUTPATIENT
Start: 2019-02-04 | End: 2019-02-04

## 2019-02-04 RX ORDER — CEFAZOLIN SODIUM 1 G/3ML
INJECTION, POWDER, FOR SOLUTION INTRAMUSCULAR; INTRAVENOUS
Status: DISCONTINUED | OUTPATIENT
Start: 2019-02-04 | End: 2019-02-04

## 2019-02-04 RX ORDER — SODIUM CHLORIDE 0.9 % (FLUSH) 0.9 %
3 SYRINGE (ML) INJECTION
Status: DISCONTINUED | OUTPATIENT
Start: 2019-02-04 | End: 2019-02-04 | Stop reason: HOSPADM

## 2019-02-04 RX ORDER — LIDOCAINE HCL/PF 100 MG/5ML
SYRINGE (ML) INTRAVENOUS
Status: DISCONTINUED | OUTPATIENT
Start: 2019-02-04 | End: 2019-02-04

## 2019-02-04 RX ORDER — BACITRACIN ZINC 500 UNIT/G
OINTMENT (GRAM) TOPICAL
Status: DISCONTINUED | OUTPATIENT
Start: 2019-02-04 | End: 2019-02-04 | Stop reason: HOSPADM

## 2019-02-04 RX ORDER — HYDROCODONE BITARTRATE AND ACETAMINOPHEN 5; 325 MG/1; MG/1
1 TABLET ORAL EVERY 4 HOURS PRN
Status: DISCONTINUED | OUTPATIENT
Start: 2019-02-04 | End: 2019-02-04 | Stop reason: HOSPADM

## 2019-02-04 RX ORDER — FENTANYL CITRATE 50 UG/ML
INJECTION, SOLUTION INTRAMUSCULAR; INTRAVENOUS
Status: DISCONTINUED | OUTPATIENT
Start: 2019-02-04 | End: 2019-02-04

## 2019-02-04 RX ORDER — CEPHALEXIN 500 MG/1
500 CAPSULE ORAL EVERY 6 HOURS
Qty: 28 CAPSULE | Refills: 0 | Status: SHIPPED | OUTPATIENT
Start: 2019-02-04 | End: 2019-02-11

## 2019-02-04 RX ADMIN — SODIUM CHLORIDE: 0.9 INJECTION, SOLUTION INTRAVENOUS at 10:02

## 2019-02-04 RX ADMIN — FENTANYL CITRATE 25 MCG: 50 INJECTION, SOLUTION INTRAMUSCULAR; INTRAVENOUS at 11:02

## 2019-02-04 RX ADMIN — PROPOFOL 25 MCG/KG/MIN: 10 INJECTION, EMULSION INTRAVENOUS at 11:02

## 2019-02-04 RX ADMIN — LIDOCAINE HYDROCHLORIDE 100 MG: 20 INJECTION, SOLUTION INTRAVENOUS at 10:02

## 2019-02-04 RX ADMIN — HEPARIN SODIUM 5000 UNITS: 5000 INJECTION, SOLUTION INTRAVENOUS; SUBCUTANEOUS at 10:02

## 2019-02-04 RX ADMIN — PROPOFOL 30 MG: 10 INJECTION, EMULSION INTRAVENOUS at 11:02

## 2019-02-04 RX ADMIN — HYDROCODONE BITARTRATE AND ACETAMINOPHEN 1 TABLET: 5; 325 TABLET ORAL at 12:02

## 2019-02-04 RX ADMIN — MIDAZOLAM HYDROCHLORIDE 2 MG: 1 INJECTION, SOLUTION INTRAMUSCULAR; INTRAVENOUS at 10:02

## 2019-02-04 RX ADMIN — PROPOFOL 40 MG: 10 INJECTION, EMULSION INTRAVENOUS at 10:02

## 2019-02-04 RX ADMIN — CEFAZOLIN 1 G: 330 INJECTION, POWDER, FOR SOLUTION INTRAMUSCULAR; INTRAVENOUS at 11:02

## 2019-02-04 NOTE — DISCHARGE INSTRUCTIONS
Instructions:    · Apply Bacitracin to the incision/nose wound two (2) times a day.  · Ok to shower tomorrow.      Discharge Instructions: Caring for Your Incision  You are going home with stitches (sutures), surgical staples, special strips of surgical tape called Steri-Strips, or surgical skin glue. One of these items was used to close your incision, help stop bleeding, and speed healing. Follow the tips on this sheet to help your incision heal.  Home care  · Always wash your hands before touching your incision.  · Keep your incision clean and dry.  · Avoid doing things that could cause dirt or sweat to get on your incision.  · Dont pick at scabs. They help protect the wound.  · Keep your incision out of water.  · Take a sponge bath to avoid getting your incision wet, unless your healthcare provider tells you otherwise.  · Ask your provider when can you take a shower or bathe.  · Ask your provider about the best way to keep your incision dry when bathing or showering.  · Pat sutures dry if they get wet. Dont rub.  · Leave the dressing (bandage) in place until you are told to remove it or change it. Change it only as directed, using clean hands.  · After the first 12 hours, change your dressing every 24 hours, or as directed by your healthcare provider.  · Change your dressing if it gets wet or soiled.  Care for specific closures  Follow these guidelines unless your child's healthcare provider tells you otherwise:  · Sutures or staples. Once you no longer need to keep these dry, clean the incision or wound daily. First remove the bandage using clean hands. Then wash the area gently with soap and warm water. Use a wet cotton swab to loosen and remove any blood or crust that forms. After cleaning, put a thin layer of antibiotic ointment on. Then put on a new bandage.  · Skin glue. Dont put liquid, ointment, or cream on your incision or wound while the glue is in place. Avoid activities that cause heavy sweating.  Protect the incision or wound from sunlight. Do not scratch, rub, or pick at the glue. Do not put tape directly over the glue. The glue should peel off within 5 to 10 days.  · Surgical tape. Keep your incision or wound dry. If it gets wet, blot the area dry with a clean towel. Surgical tape usually falls off within 7 to 10 days. If it has not fallen off after 10 days, contact your healthcare provider before taking it off yourself. If you are told to remove the tape, put mineral oil or petroleum jelly on a cotton ball. Gently rub the tape until it is removed.  Follow-up care  Follow up with your healthcare provider to ask how long sutures or staples should be left in place. Be sure to return for suture or staple removal as directed. If dissolving stitches were used in your mouth, these will not need to be removed. They should fall out or dissolve on their own.  If tape closures were used, remove them yourself when your provider recommends if they have not fallen off on their own. If skin glue was used, the glue will wear off by itself.      When to seek medical care  Call your healthcare provider right away if you have any of the following:  · More pain, redness, swelling, bleeding, or foul-smelling discharge around the incision area  · Fever of 100.4°F (38°C) or higher, or as directed by your healthcare provider  · Shaking chills  · Vomiting or nausea that doesnt go away  · Numbness, coldness, or tingling around the incision area, or changes in skin color  · Opening of the sutures or wound  · Stitches or staples come apart or fall out or surgical tape falls off before 7 days, or as directed by your provider   Date Last Reviewed: 10/22/2014  © 6785-3181 [employer]. [address] [place] [address], [place] [address]. All rights reserved. This information is not intended as a substitute for professional medical care. Always follow your healthcare professional's instructions.        Discharge Instructions: After  Your Surgery  Youve just had surgery. During surgery, you were given medicine called anesthesia to keep you relaxed and free of pain. After surgery, you may have some pain or nausea. This is common. Here are some tips for feeling better and getting well after surgery.     Stay on schedule with your medicine.   Going home  Your healthcare provider will show you how to take care of yourself when you go home. He or she will also answer your questions. Have an adult family member or friend drive you home. For the first 24 hours after your surgery:  · Do not drive or use heavy equipment.  · Do not make important decisions or sign legal papers.  · Do not drink alcohol.  · Have someone stay with you, if needed. He or she can watch for problems and help keep you safe.  Be sure to go to all follow-up visits with your healthcare provider. And rest after your surgery for as long as your healthcare provider tells you to.  Coping with pain  If you have pain after surgery, pain medicine will help you feel better. Take it as told, before pain becomes severe. Also, ask your healthcare provider or pharmacist about other ways to control pain. This might be with heat, ice, or relaxation. And follow any other instructions your surgeon or nurse gives you.  Tips for taking pain medicine  To get the best relief possible, remember these points:  · Pain medicines can upset your stomach. Taking them with a little food may help.  · Most pain relievers taken by mouth need at least 20 to 30 minutes to start to work.  · Taking medicine on a schedule can help you remember to take it. Try to time your medicine so that you can take it before starting an activity. This might be before you get dressed, go for a walk, or sit down for dinner.  · Constipation is a common side effect of pain medicines. Call your healthcare provider before taking any medicines such as laxatives or stool softeners to help ease constipation. Also ask if you should skip any  foods. Drinking lots of fluids and eating foods such as fruits and vegetables that are high in fiber can also help. Remember, do not take laxatives unless your surgeon has prescribed them.  · Drinking alcohol and taking pain medicine can cause dizziness and slow your breathing. It can even be deadly. Do not drink alcohol while taking pain medicine.  · Pain medicine can make you react more slowly to things. Do not drive or run machinery while taking pain medicine.  Your healthcare provider may tell you to take acetaminophen to help ease your pain. Ask him or her how much you are supposed to take each day. Acetaminophen or other pain relievers may interact with your prescription medicines or other over-the-counter (OTC) medicines. Some prescription medicines have acetaminophen and other ingredients. Using both prescription and OTC acetaminophen for pain can cause you to overdose. Read the labels on your OTC medicines with care. This will help you to clearly know the list of ingredients, how much to take, and any warnings. It may also help you not take too much acetaminophen. If you have questions or do not understand the information, ask your pharmacist or healthcare provider to explain it to you before you take the OTC medicine.  Managing nausea  Some people have an upset stomach after surgery. This is often because of anesthesia, pain, or pain medicine, or the stress of surgery. These tips will help you handle nausea and eat healthy foods as you get better. If you were on a special food plan before surgery, ask your healthcare provider if you should follow it while you get better. These tips may help:  · Do not push yourself to eat. Your body will tell you when to eat and how much.  · Start off with clear liquids and soup. They are easier to digest.  · Next try semi-solid foods, such as mashed potatoes, applesauce, and gelatin, as you feel ready.  · Slowly move to solid foods. Dont eat fatty, rich, or spicy foods  at first.  · Do not force yourself to have 3 large meals a day. Instead eat smaller amounts more often.  · Take pain medicines with a small amount of solid food, such as crackers or toast, to avoid nausea.     Call your surgeon if  · You still have pain an hour after taking medicine. The medicine may not be strong enough.  · You feel too sleepy, dizzy, or groggy. The medicine may be too strong.  · You have side effects like nausea, vomiting, or skin changes, such as rash, itching, or hives.       If you have obstructive sleep apnea  You were given anesthesia medicine during surgery to keep you comfortable and free of pain. After surgery, you may have more apnea spells because of this medicine and other medicines you were given. The spells may last longer than usual.   At home:  · Keep using the continuous positive airway pressure (CPAP) device when you sleep. Unless your healthcare provider tells you not to, use it when you sleep, day or night. CPAP is a common device used to treat obstructive sleep apnea.  · Talk with your provider before taking any pain medicine, muscle relaxants, or sedatives. Your provider will tell you about the possible dangers of taking these medicines.  Date Last Reviewed: 12/1/2016  © 8350-6138 [employer]. [address] [place] [address], [place] [address]. All rights reserved. This information is not intended as a substitute for professional medical care. Always follow your healthcare professional's instructions.

## 2019-02-04 NOTE — TRANSFER OF CARE
"Anesthesia Transfer of Care Note    Patient: Bing Burch    Procedure(s) Performed: Procedure(s) (LRB):  CLOSURE, MOHS PROCEDURE DEFECT - left nose (Left)    Patient location: PACU    Anesthesia Type: general    Transport from OR: Transported from OR on 2-3 L/min O2 by NC with adequate spontaneous ventilation    Post pain: adequate analgesia    Post assessment: no apparent anesthetic complications    Post vital signs: stable    Level of consciousness: awake, alert and oriented    Nausea/Vomiting: no nausea/vomiting    Complications: none    Transfer of care protocol was followed      Last vitals:   Visit Vitals  BP (!) 140/80 (BP Location: Left arm, Patient Position: Lying)   Pulse 90   Temp 37 °C (98.6 °F) (Oral)   Resp 16   Ht 5' 6" (1.676 m)   Wt 90.7 kg (200 lb)   SpO2 100%   BMI 32.28 kg/m²     "

## 2019-02-04 NOTE — ANESTHESIA RELEASE NOTE
"Anesthesia Release from PACU Note    Patient: Bing Burch    Procedure(s) Performed: Procedure(s) (LRB):  CLOSURE, MOHS PROCEDURE DEFECT - left nose (Left)    Anesthesia type: general    Post pain: Adequate analgesia    Post assessment: no apparent anesthetic complications    Last Vitals:   Visit Vitals  /67 (BP Location: Left arm, Patient Position: Lying)   Pulse 83   Temp 36.9 °C (98.4 °F) (Temporal)   Resp 16   Ht 5' 6" (1.676 m)   Wt 90.7 kg (200 lb)   SpO2 97%   BMI 32.28 kg/m²       Post vital signs: stable    Level of consciousness: awake, alert  and oriented    Nausea/Vomiting: no nausea/no vomiting    Complications: none    Airway Patency: patent    Respiratory: unassisted    Cardiovascular: stable and blood pressure at baseline    Hydration: euvolemic  "

## 2019-02-04 NOTE — DISCHARGE SUMMARY
Discharge Note    SUMMARY     Admit Date: 2/4/2019    Discharge Date and Time:  02/04/2019 11:39 AM    Hospital Course (synopsis of major diagnoses, care, treatment, and services provided during the course of the hospital stay): surgery as scheduled, dc home     Final Diagnosis: Post-Op Diagnosis Codes:     * Mohs defect of left nose [M95.0]    Disposition: Home or Self Care    Follow Up/Patient Instructions:     Medications:  Reconciled Home Medications:      Medication List      ASK your doctor about these medications    azithromycin 250 MG tablet  Commonly known as:  ZITHROMAX Z-PATRICIA  As directed     benzonatate 100 MG capsule  Commonly known as:  TESSALON PERLES  Take 1 capsule (100 mg total) by mouth every 6 (six) hours as needed for Cough.     buPROPion 300 MG 24 hr tablet  Commonly known as:  WELLBUTRIN XL  Take 1 tablet (300 mg total) by mouth once daily.     calcium carbonate 500 mg calcium (1,250 mg) chewable tablet  Commonly known as:  OS-ALLEY  Take 1 tablet by mouth once daily.     diazePAM 5 MG tablet  Commonly known as:  VALIUM  Take 1 tablet (5 mg total) by mouth every 6 (six) hours as needed (muscle spasm).     esomeprazole 40 MG capsule  Commonly known as:  NEXIUM  Take 1 capsule (40 mg total) by mouth 2 (two) times daily.     gabapentin 300 MG capsule  Commonly known as:  NEURONTIN  Take 1 capsule (300 mg total) by mouth every evening.     hydrOXYzine HCl 25 MG tablet  Commonly known as:  ATARAX  Take 1 tablet (25 mg total) by mouth nightly as needed (insomnia).     mometasone 50 mcg/actuation nasal spray  Commonly known as:  NASONEX  2 sprays by Nasal route daily as needed (allergies).     ondansetron 4 MG Tbdl  Commonly known as:  ZOFRAN-ODT  Take 1 tablet (4 mg total) by mouth every 8 (eight) hours as needed.          No discharge procedures on file.    Follow up 2 weeks

## 2019-02-04 NOTE — PLAN OF CARE
Patient arrived from PACU with LEXI Mireles RN.  Patient stable.  Report received at this time.  Assumed care of patient at this time.

## 2019-02-04 NOTE — H&P
58F s/p resection of 0.8x1cm BCC of L nose on Friday, here for closure. She denies any recent medical issues or sicknesses.     PMH: GERD, cervical radiculitis  PSH: as above    Soc: noncontributory  Fam Hx: noncontributory    ROS: 12 systems reviewed negative    PE:   NAD, pleasant  nonlabored breathing  Images reviewed, L nasal skin defect    A/P:   58F s/p BCC resection L nose. Plan of reconstruction with local tissue rearrangement  -anticipate dc home  -consent obtained

## 2019-02-04 NOTE — PLAN OF CARE
Patient and patient's spouse received discharge instructions and prescriptions.  Patient and patient's spouse verbalized understanding of all instructions given and all questions were addressed prior to patient's discharge.  Patient's vital signs are stable and within patient's baseline.  Patient tolerated clear liquids PO.  Patient voided 100 mL of clear yellow urine without difficulty in post-op.  Patient denies pain.  Patient denies nausea and vomiting at this time.  Patient meets all criteria for discharge at this time.  All required consents present in patient's chart upon patient's discharge.

## 2019-02-04 NOTE — BRIEF OP NOTE
[facility]  Brief Operative Note     SUMMARY     Surgery Date: 2/4/2019     Surgeon(s) and Role:     * Fran Reddy MD - Primary     * Taurus Poole MD - Resident - Assisting     * Hari Watt MD - Resident - Assisting        Pre-op Diagnosis:  Mohs defect of left nose [M95.0]    Post-op Diagnosis:  Post-Op Diagnosis Codes:     * Mohs defect of left nose [M95.0]    Procedure(s) (LRB):  CLOSURE, MOHS PROCEDURE DEFECT - left nose (Left)    Anesthesia: Local MAC    Description of the findings of the procedure:  Skin defect from prior mohs excision    Findings/Key Components: closed with bilobed flap    Estimated Blood Loss: * No values recorded between 2/4/2019 11:07 AM and 2/4/2019 11:38 AM *         Specimens:   Specimen (12h ago, onward)    None

## 2019-02-04 NOTE — ANESTHESIA POSTPROCEDURE EVALUATION
"Anesthesia Post Evaluation    Patient: Bing Burch    Procedure(s) Performed: Procedure(s) (LRB):  CLOSURE, MOHS PROCEDURE DEFECT - left nose (Left)    Final Anesthesia Type: general  Patient location during evaluation: PACU  Patient participation: Yes- Able to Participate  Level of consciousness: awake and alert  Post-procedure vital signs: reviewed and stable  Pain management: adequate  Airway patency: patent  PONV status at discharge: No PONV  Anesthetic complications: no      Cardiovascular status: blood pressure returned to baseline  Respiratory status: unassisted  Hydration status: euvolemic  Follow-up not needed.        Visit Vitals  /67 (BP Location: Left arm, Patient Position: Lying)   Pulse 83   Temp 36.9 °C (98.4 °F) (Temporal)   Resp 16   Ht 5' 6" (1.676 m)   Wt 90.7 kg (200 lb)   SpO2 97%   BMI 32.28 kg/m²       Pain/Bran Score: Pain Rating Prior to Med Admin: 4 (2/4/2019 12:06 PM)  Pain Rating Post Med Admin: 3 (2/4/2019 12:50 PM)  Bran Score: 10 (2/4/2019  1:15 PM)        "

## 2019-02-04 NOTE — ANESTHESIA PREPROCEDURE EVALUATION
02/04/2019  Bing Burch is a 58 y.o., female with GERD basal cell carcinoma of the nose here for the procedure below.    Pre-operative evaluation for CLOSURE, MOHS PROCEDURE DEFECT - left nose (Left)    Previous Airway: none on file    Past Surgical History:   Procedure Laterality Date    ESOPHAGOGASTRODUODENOSCOPY (EGD) N/A 8/18/2017    Performed by Attila Wei MD at [facility] ENDO    HERNIA REPAIR      hiatal hernia repair in 2013      SINUS SURGERY      TONSILLECTOMY      TUMOR EXCISION           Vital Signs Range (Last 24H):         CBC:   No results for input(s): WBC, RBC, HGB, HCT, PLT, MCV, MCH, MCHC in the last 720 hours.    CMP: No results for input(s): NA, K, CL, CO2, BUN, CREATININE, GLU, MG, PHOS, CALCIUM, ALBUMIN, PROT, ALKPHOS, ALT, AST, BILITOT in the last 720 hours.    INR:  No results for input(s): PT, INR, PROTIME, APTT in the last 720 hours.      Diagnostic Studies:      EKG:  Vent. Rate : 066 BPM     Atrial Rate : 066 BPM     P-R Int : 176 ms          QRS Dur : 086 ms      QT Int : 392 ms       P-R-T Axes : 057 032 021 degrees     QTc Int : 410 ms    Normal sinus rhythm  Normal ECG  When compared with ECG of 05-JUN-2018 23:17,  No significant change was found  Confirmed by Néstor CONNOLLY, Bola ([id number]) on 9/11/2018 7:05:28 PM      2D Echo: none on file      Anesthesia Evaluation    I have reviewed the Patient Summary Reports.    I have reviewed the Nursing Notes.   I have reviewed the Medications.     Review of Systems  Anesthesia Hx:  No problems with previous Anesthesia  History of prior surgery of interest to airway management or planning: Denies Family Hx of Anesthesia complications.   Denies Personal Hx of Anesthesia complications.   Social:  Former Smoker    Hematology/Oncology:  Hematology Normal   Oncology Normal     EENT/Dental:EENT/Dental Normal    Cardiovascular:  Cardiovascular Normal  Denies Hypertension.  Denies MI.          Pulmonary:  Pulmonary Normal  Denies Asthma.  Denies Shortness of breath.    Renal/:  Renal/ Normal     Hepatic/GI:   GERD Denies Hepatitis.    Musculoskeletal:  Spine Disorders: cervical    Neurological:   Neuromuscular Disease,   Chronic Pain Syndrome   Endocrine:  Endocrine Normal Denies Diabetes. Denies Hypothyroidism.    Dermatological:  Skin Normal    Psych:  Psychiatric Normal           Physical Exam  General:  Well nourished, Obesity    Airway/Jaw/Neck:  Airway Findings: Mouth Opening: Small, but > 3cm Tongue: Normal  General Airway Assessment: Adult  Mallampati: III  TM Distance: Normal, at least 6 cm  Jaw/Neck Findings:  Neck ROM: Normal Extension, Painful      Dental:  Dental Findings: In tact   Chest/Lungs:  Chest/Lungs Findings: Normal Respiratory Rate     Heart/Vascular:  Heart Findings: Rate: Normal  Rhythm: Regular Rhythm        Mental Status:  Mental Status Findings:  Cooperative, Alert and Oriented         Anesthesia Plan  Type of Anesthesia, risks & benefits discussed:  Anesthesia Type:  MAC  Patient's Preference:   Intra-op Monitoring Plan: standard ASA monitors  Intra-op Monitoring Plan Comments:   Post Op Pain Control Plan: per primary service following discharge from PACU  Post Op Pain Control Plan Comments:   Induction:   IV  Beta Blocker:  Patient is not currently on a Beta-Blocker (No further documentation required).       Informed Consent: Patient understands risks and agrees with Anesthesia plan.  Questions answered. Anesthesia consent signed with patient.  ASA Score: 2     Day of Surgery Review of History & Physical:    H&P update referred to the surgeon.         Ready For Surgery From Anesthesia Perspective.

## 2019-02-05 NOTE — OP NOTE
DATE OF PROCEDURE:  02/04/2019.    PREOPERATIVE DIAGNOSIS:  Mohs defect of the left naris.    POSTOPERATIVE DIAGNOSIS:  Mohs defect of the left naris.    PROCEDURES PERFORMED:  1.  Wound preparation for flap.  2.  Bilobed flap to the nose; the flap measuring less than 10 cm.    DESCRIPTION OF PROCEDURE:  The patient was evaluated in the preoperative holding   area.  Markings were made for the bilobed flap.  This was drawn actually for   the patient, so she could see what we were going to do.  The risks and possible   complications including, but not limited to infection, bleeding, scarring, nasal   asymmetry, nasal deformity, loss of the flap with open wound and need for   further surgery were all explained.  The patient signed the informed consent.    The patient was taken to the Operative Room and placed in the supine position,   after adequate general endotracheal anesthesia, was prepped and draped in the   normal sterile fashion.  Markings again for the bilobed flap were then made.    The flap was incised, staying above the perichondrium of the nose and the left   naris.  The bilobed flap was then transposed into the defect and easily closed   using interrupted 5-0 nylon sutures.  There were no complications with this   procedure.  The patient was rolled to the Recovery Room in stable condition.      JOE/JOHN  dd: 02/05/2019 10:11:16 (CST)  td: 02/05/2019 10:46:46 (CST)  Doc ID   #[id number]  Job ID #[id number]    CC:

## 2019-02-07 ENCOUNTER — CLINICAL SUPPORT (OUTPATIENT)
Dept: REHABILITATION | Facility: HOSPITAL | Age: 59
End: 2019-02-07
Payer: COMMERCIAL

## 2019-02-07 DIAGNOSIS — M54.12 CERVICAL RADICULITIS: ICD-10-CM

## 2019-02-07 PROCEDURE — 97010 HOT OR COLD PACKS THERAPY: CPT | Mod: PN

## 2019-02-07 PROCEDURE — 97110 THERAPEUTIC EXERCISES: CPT | Mod: PN

## 2019-02-07 PROCEDURE — 97012 MECHANICAL TRACTION THERAPY: CPT | Mod: PN

## 2019-02-07 NOTE — PROGRESS NOTES
Name: Bing Burch  [facility] Number: [id number]  Date of Treatment: 02/07/2019   Diagnosis:   Encounter Diagnosis   Name Primary?    Cervical radiculitis        Time in: 1002  Time Out: 1100  Total Treatment Time: 58      Subjective:    Bing reports doing better overall.  Had surgery on skin cancer.  Patient reports their pain to be 2/10 on a 0-10 scale with 0 being no pain and 10 being the worst pain imaginable.    Objective    Patient received individual therapy to increase strength, ROM, flexibility and posture with activities as follows:     Bing received therapeutic exercises to develop strength, ROM, flexibility and posture for 43 minutes including:     Cervical traction 15#/8#; 30/10 on/off cycle x 15 minutes, in conjunction w/MHP  UBE 5/5  Chin tucks x 30  B ER OTB w/scapular setting x 30  Corner pec stretch 3 x 30 sec B  CC: 7# rows, 3# extension, ER, adduction x 30 each    Written Home Exercises Provided: cont HEP  Pt demo good understanding of the education provided. Bing demonstrated good return demonstration of activities.     Assessment:     No increase in s/s reported.  Improved cervical movement.  Pt will continue to benefit from skilled PT intervention. Medical Necessity is demonstrated by:  Pain limits function of effected part for some activities, Unable to participate fully in daily activities, Requires skilled supervision to complete and progress HEP and Weakness.    Patient is making good progress towards established goals.      Plan:  Continue with established Plan of Care towards PT goals.

## 2019-02-12 ENCOUNTER — CLINICAL SUPPORT (OUTPATIENT)
Dept: REHABILITATION | Facility: HOSPITAL | Age: 59
End: 2019-02-12
Payer: COMMERCIAL

## 2019-02-12 DIAGNOSIS — M54.12 CERVICAL RADICULITIS: ICD-10-CM

## 2019-02-12 PROCEDURE — 97110 THERAPEUTIC EXERCISES: CPT | Mod: PN

## 2019-02-12 PROCEDURE — 97140 MANUAL THERAPY 1/> REGIONS: CPT | Mod: PN

## 2019-02-12 PROCEDURE — 97012 MECHANICAL TRACTION THERAPY: CPT | Mod: PN

## 2019-02-12 NOTE — PROGRESS NOTES
"Name: Bing Burch  [facility] Number: [id number]  Date of Treatment: 02/12/2019   Diagnosis:   Encounter Diagnosis   Name Primary?    Cervical radiculitis        Time in: 1000  Time Out: 1058  Total Billable Time: 58      Subjective:    Bing reports "Stiffness".  Patient reports their pain to be 0/10 on a 0-10 scale with 0 being no pain and 10 being the worst pain imaginable.    Objective    Patient received individual therapy to increase strength, flexibility and posture with activities as follows:     Bing received therapeutic exercises to develop strength, flexibility and posture for 43 minutes including:     Cervical traction 15#/8#; 30/10 on/off cycle x 15 minutes, in conjunction w/MHP  UBE 5/5, level 2  Chin tucks x 30  UT stretch 3 x 30 sec B  B ER OTB w/scapular setting x 30  D2 flexion OTB x 10 B  Corner pec stretch 3 x 30 sec B  Push up plus on wall x 30  CC: 7# rows, 3# extension, ER, adduction x 30 each      Written Home Exercises Provided: cont HEP  Pt demo good understanding of the education provided. Bing demonstrated good return demonstration of activities.     Assessment:     Good tolerance for activity.  Weakness remains UE musculature.  Pt will continue to benefit from skilled PT intervention. Medical Necessity is demonstrated by:  Pain limits function of effected part for some activities, Unable to participate fully in daily activities, Requires skilled supervision to complete and progress HEP and Weakness.    Patient is making good progress towards established goals.      Plan:  Continue with established Plan of Care towards PT goals.   "

## 2019-02-15 ENCOUNTER — OFFICE VISIT (OUTPATIENT)
Dept: SPINE | Facility: CLINIC | Age: 59
End: 2019-02-15
Payer: COMMERCIAL

## 2019-02-15 ENCOUNTER — OFFICE VISIT (OUTPATIENT)
Dept: PLASTIC SURGERY | Facility: CLINIC | Age: 59
End: 2019-02-15
Payer: COMMERCIAL

## 2019-02-15 VITALS
HEIGHT: 66 IN | DIASTOLIC BLOOD PRESSURE: 87 MMHG | HEART RATE: 106 BPM | WEIGHT: 199 LBS | BODY MASS INDEX: 31.98 KG/M2 | SYSTOLIC BLOOD PRESSURE: 135 MMHG

## 2019-02-15 VITALS — BODY MASS INDEX: 32.13 KG/M2 | WEIGHT: 199.94 LBS | HEIGHT: 66 IN

## 2019-02-15 DIAGNOSIS — M54.12 CERVICAL RADICULITIS: Primary | ICD-10-CM

## 2019-02-15 DIAGNOSIS — Z09 SURGERY FOLLOW-UP EXAMINATION: Primary | ICD-10-CM

## 2019-02-15 DIAGNOSIS — M54.2 CERVICALGIA: ICD-10-CM

## 2019-02-15 PROCEDURE — 3008F PR BODY MASS INDEX (BMI) DOCUMENTED: ICD-10-PCS | Mod: ,,, | Performed by: PHYSICAL MEDICINE & REHABILITATION

## 2019-02-15 PROCEDURE — 99024 PR POST-OP FOLLOW-UP VISIT: ICD-10-PCS | Mod: S$GLB,,, | Performed by: SURGERY

## 2019-02-15 PROCEDURE — 99024 POSTOP FOLLOW-UP VISIT: CPT | Mod: S$GLB,,, | Performed by: SURGERY

## 2019-02-15 PROCEDURE — 99213 OFFICE O/P EST LOW 20 MIN: CPT | Mod: ,,, | Performed by: PHYSICAL MEDICINE & REHABILITATION

## 2019-02-15 PROCEDURE — 99213 PR OFFICE/OUTPT VISIT, EST, LEVL III, 20-29 MIN: ICD-10-PCS | Mod: ,,, | Performed by: PHYSICAL MEDICINE & REHABILITATION

## 2019-02-15 PROCEDURE — 3008F BODY MASS INDEX DOCD: CPT | Mod: ,,, | Performed by: PHYSICAL MEDICINE & REHABILITATION

## 2019-02-15 PROCEDURE — 99999 PR PBB SHADOW E&M-EST. PATIENT-LVL III: ICD-10-PCS | Mod: PBBFAC,,, | Performed by: SURGERY

## 2019-02-15 PROCEDURE — 99999 PR PBB SHADOW E&M-EST. PATIENT-LVL III: CPT | Mod: PBBFAC,,, | Performed by: SURGERY

## 2019-02-15 NOTE — PROGRESS NOTES
SUBJECTIVE:    Patient ID: Bing Burch is a 58 y.o. female.    Chief Complaint: Follow-up (post physical therapy )    She is here for follow-up.  She has been attending outpatient physical therapy with good results.  She definitely has improved range of motion of the neck and decreased neck pain overall.  She says that her pain is usually around 2/10 but still, perhaps once a week, it gets up to 6 or 7/10 but is relieved with stretching.  She does have persistent intermittent right arm tingling in the same C6 distribution but even that seems to have improved somewhat.  Having said that she is not entirely satisfied with her quality of life and would like to proceed with epidural steroid injection.  She has no new or progressive problems          Past Medical History:   Diagnosis Date    GERD (gastroesophageal reflux disease)     Irritable bowel syndrome     mixed type, with slightly predominant constipation     Social History     Socioeconomic History    Marital status:      Spouse name: Not on file    Number of children: Not on file    Years of education: Not on file    Highest education level: Not on file   Social Needs    Financial resource strain: Not on file    Food insecurity - worry: Not on file    Food insecurity - inability: Not on file    Transportation needs - medical: Not on file    Transportation needs - non-medical: Not on file   Occupational History    Not on file   Tobacco Use    Smoking status: Former Smoker     Last attempt to quit: 1989     Years since quittin.4    Smokeless tobacco: Never Used   Substance and Sexual Activity    Alcohol use: Yes     Comment: occasionally    Drug use: No    Sexual activity: Not Currently   Other Topics Concern    Are you pregnant or think you may be? Not Asked    Breast-feeding Not Asked   Social History Narrative    Not on file     Past Surgical History:   Procedure Laterality Date    CLOSURE, MOHS PROCEDURE DEFECT  "- left nose Left 2/4/2019    Performed by [facility] at [facility] OR 2ND FLR    ESOPHAGOGASTRODUODENOSCOPY (EGD) N/A 8/18/2017    [facility] ENDO    HERNIA REPAIR      hiatal hernia repair in 2013      SINUS SURGERY      TONSILLECTOMY      TUMOR EXCISION       Family History   Problem Relation Age of Onset    Psoriasis Brother     Breast cancer Mother 55    Eczema Neg Hx     Lupus Neg Hx     Melanoma Neg Hx      Vitals:    02/15/19 0805   Weight: 90.7 kg (199 lb 15.3 oz)   Height: 5' 6" (1.676 m)       Review of Systems   Constitutional: Negative for chills, diaphoresis, fatigue, fever and unexpected weight change.   HENT: Negative for trouble swallowing.    Eyes: Negative for visual disturbance.   Respiratory: Negative for shortness of breath.    Cardiovascular: Negative for chest pain.   Gastrointestinal: Negative for abdominal pain, constipation, nausea and vomiting.   Genitourinary: Negative for difficulty urinating.   Musculoskeletal: Negative for arthralgias, back pain, gait problem, joint swelling, myalgias, neck pain and neck stiffness.   Neurological: Negative for dizziness, speech difficulty, weakness, light-headedness, numbness and headaches.          Objective:      Physical Exam   Constitutional: She appears well-developed and well-nourished.   Not examined           Assessment:       1. Cervical radiculitis    2. Cervicalgia           Plan:     we will schedule her for interlaminar epidural steroid injections at C7-T1.  I suspect that will help her some with her neck pain and possibly with the radicular arm pain.  I suggested that if she does not improved we may consider EMG and nerve conduction studies with the understanding that even if we get a more definite diagnosis there may not be anything that we can do about it.  I also advised her that after she finishes her physical therapy it is imperative that she continue her home exercise program.  I " will see her back after the injection      Cervical radiculitis    Cervicalgia

## 2019-02-19 ENCOUNTER — PATIENT MESSAGE (OUTPATIENT)
Dept: SPINE | Facility: CLINIC | Age: 59
End: 2019-02-19

## 2019-02-19 ENCOUNTER — TELEPHONE (OUTPATIENT)
Dept: PAIN MEDICINE | Facility: CLINIC | Age: 59
End: 2019-02-19

## 2019-02-19 ENCOUNTER — CLINICAL SUPPORT (OUTPATIENT)
Dept: REHABILITATION | Facility: HOSPITAL | Age: 59
End: 2019-02-19
Payer: COMMERCIAL

## 2019-02-19 DIAGNOSIS — M54.12 CERVICAL RADICULITIS: ICD-10-CM

## 2019-02-19 PROCEDURE — 97110 THERAPEUTIC EXERCISES: CPT | Mod: PN | Performed by: PHYSICAL THERAPIST

## 2019-02-19 NOTE — TELEPHONE ENCOUNTER
----- Message from Braden Morales MD sent at 2/19/2019  3:21 PM CST -----  Please schedule for interlaminar GUILLERMO at C7-T1

## 2019-02-19 NOTE — PROGRESS NOTES
Name: Bing Burch  [facility] Number: [id number]  Date of Treatment: 02/19/2019   Diagnosis:   Encounter Diagnosis   Name Primary?    Cervical radiculitis        Time in: 1000  Time Out: 1100  Total Treatment Time: 60  Group Time: 0      Subjective:    Bing reports increased pain following being on a [place] [employer] float this weekend.  Patient reports their pain to be 8/10 on a 0-10 scale with 0 being no pain and 10 being the worst pain imaginable.    Objective    Patient received individual therapy to increase strength, endurance, ROM, flexibility and posture with 0 patients with activities as follows:     Bing received therapeutic exercises to develop strength for 60 minutes including:     Anterior chest wall stretch 10 x 10   UBE 5 min/5 min  Cable column extension 3 x 10 3#  Cable column adduction 3 x 10 3#  Cable column mid row 3 x 10 3#  Cable column ER 3 x 10 3#  Scapular retraction 3 x 10 GTB  Upper trap stretch 10 x 10 sec B  Standing rhomboid stretch 10 x 10 sec  Chin tucks 3 x 10     Patient received intermittent cervical traction x 15 min at 15# max/ 8# min, 30 sec on/ 10 sec off       Assessment:       Pt will continue to benefit from skilled PT intervention. Medical Necessity is demonstrated by:  Unable to participate fully in daily activities, Requires skilled supervision to complete and progress HEP and Weakness.    Patient is making good progress towards established goals.    New/Revised goals: na      Plan:  Continue with established Plan of Care towards PT goals.

## 2019-02-26 ENCOUNTER — CLINICAL SUPPORT (OUTPATIENT)
Dept: REHABILITATION | Facility: HOSPITAL | Age: 59
End: 2019-02-26
Payer: COMMERCIAL

## 2019-02-26 DIAGNOSIS — M54.12 CERVICAL RADICULITIS: ICD-10-CM

## 2019-02-26 PROCEDURE — 97140 MANUAL THERAPY 1/> REGIONS: CPT | Mod: PN | Performed by: PHYSICAL THERAPIST

## 2019-02-26 PROCEDURE — 97110 THERAPEUTIC EXERCISES: CPT | Mod: PN | Performed by: PHYSICAL THERAPIST

## 2019-02-26 PROCEDURE — 97012 MECHANICAL TRACTION THERAPY: CPT | Mod: PN | Performed by: PHYSICAL THERAPIST

## 2019-02-26 NOTE — PLAN OF CARE
TIME RECORD    Date: 02/26/2019    Start Time:  1000  Stop Time:  1100    PROCEDURES:    TIMED  Procedure Time Min.    Start:  Stop:     Start:  Stop:     Start:  Stop:     Start:  Stop:          UNTIMED  Procedure Time Min.    Start:  Stop:     Start:  Stop:      Total Timed Minutes:  60  Total Timed Units:  4  Total Untimed Units:  0  Charges Billed/# of units:  4    PHYSICAL THERAPY UPDATED PLAN OF TREATMENT    Patient name: Bing Burch  Onset Date:  11/2018  SOC Date:  1/7/2019  Primary Diagnosis:    1. Cervical radiculitis       Treatment Diagnosis:  Cervical pain  Certification Period:  1/7/2019 to 2/22/2019  Precautions:  Standard  Visits from SOC:  12  Functional Level Prior to SOC:  Decreased ability to perform ADL    Updated Assessment:      S: The patient reports continued pain at end range with rotation and side bending. She is responding well to cervical traction and dry needling    O:  CROM:  Previous  Current  Flexion   48*   54*  Extension  50*   54*  Left side bending  24*   30*  Right side bending 30*   30*  Left rotation  42*   50*  Right rotation  58*   60*    MMT:   Left Right  Left Right  Shoulder flexion 5/5 5/5  5/5 5/5   Shoulder abduction 5/5 5/5  5/5 5/5  Shoulder ER  5/5 5/5  5/5 5/5  Elbow flexion  5/5 5/5  5/5 5/5  Elbow extension 5/5 5/5  5/5 5/5    A: The patient has been responding well to cervical traction and dry needling. She had an exacerbation of symptoms following a period of prolonged activity but appears to be once again improving    Previous Short Term Goals Status:   Progressing  1. Patient will be begin a written HEP  2. Decrease soft tissue tenderness to mild  3. Decrease pain at worst to 5/10     Long Term Goal Status:   continue per initial plan of care.  1. Patient will be independent with a written HEP  2. Eliminate soft tissue tenderness  3. Decrease pain at worst to 2/10  4. Increase CROM to WNL    Reasons for Recertification of Therapy:   Progress LEENA  decrease pain and promote postural muscle strengthening    Certification Period: 3/18/2019 to 5/10/2019  Recommended Treatment Plan: 2 times per week for 6 weeks: Electrical Stimulation IFC/IMS, Manual Therapy, Moist Heat/ Ice, Patient Education, Therapeutic Activites and Therapeutic Exercise  Other Recommendations: Progress as tolerated.        Therapist's Name: Romeo Edwards, PT   Date: 02/26/2019    I CERTIFY THE NEED FOR THESE SERVICES FURNISHED UNDER THIS PLAN OF TREATMENT AND WHILE UNDER MY CARE    Physician's comments: ________________________________________________________________________________________________________________________________________________      Physician's Name: ___________________________________

## 2019-02-26 NOTE — PROGRESS NOTES
Name: Bing Burch  [facility] Number: [id number]  Date of Treatment: 02/26/2019   Diagnosis:   Encounter Diagnosis   Name Primary?    Cervical radiculitis        Time in: 1000  Time Out: 1100  Total Treatment Time: 60  Group Time: 0      Subjective:    Bing reports decreased pain since last session but continues to have pain at end range with rotation.  Patient reports their pain to be 5/10 on a 0-10 scale with 0 being no pain and 10 being the worst pain imaginable.    Objective    Patient received individual therapy to increase strength, flexibility and posture with 0 patients with activities as follows:     Bing received therapeutic exercises to develop strength, flexibility and posture for 30 minutes including:     Anterior chest wall stretch 10 x 10 sec   Cable column extension 3 x 10  3#  B  Cable column adduction 3 x 10  3#  B  Cable column mid row 3 x 10 3#    Scapular retraction with GTB 3 x 10    Patient received intermittent cervical traction x 15 min at 15# max/ 8# min, 30 sec max/ 10 sec min    Dry needling was performed to the patient's bilateral upper trapezius and cervical paraspinals with 50 mm needles for 15 minutes. Decreased pain was the result of the procedure.    Assessment:       Pt will continue to benefit from skilled PT intervention. Medical Necessity is demonstrated by:  Requires skilled supervision to complete and progress HEP and Weakness.    Patient is making good progress towards established goals.    New/Revised goals: see updated plan of care      Plan:  Continue with established Plan of Care towards PT goals.

## 2019-02-27 ENCOUNTER — PATIENT OUTREACH (OUTPATIENT)
Dept: ADMINISTRATIVE | Facility: HOSPITAL | Age: 59
End: 2019-02-27

## 2019-02-27 NOTE — LETTER
"March 7, 2019    Bing Burch  [address] [place] [address]             [facility]  [address]  [facility] [address]  Phone: [phone] Dear Bing Burch,       [facility] is committed to your overall health.  To help you get the most out of each of your visits, we will review your information to make sure you are up to date on all of your recommended tests and/or procedures.       As a new patient to Dr. MEENA ANGEL, we may not have your complete medical records.  He has found that your chart shows you may be due for a:     COLORECTAL CANCER SCREENING     If you have already had your screening, or you have made an appointment for your screening, congratulations!  You're on the road to good health. If you haven't signed up for a colorectal screening please accept this invitation to be screened.       According to the American Cancer Society, colon cancer is the third most common cancer for people in the [place].  A simple screening test "Fit Kit" - done in your own home - can help find colon cancer at an e natalia stage when it can be treated, even before any signs or symptoms develop. THIS IS A YEARLY TEST.     Testing for blood in your stool (feces or bowel movement) is the first step. If you have an upcoming visit with your Primary Care Physician you can  a Fit Kit during your visit or you can  a Fit Kit at your Primary Care Clinic prior to your visit.     The Fit Kit includes:     Instructions on how to perform the test   (1) Sheet of tissue paper   (1) Small Absorption pad   (1) Bottle to hold the sample and a small probe to help you take the sample   (1) Small plastic bio-hazard bag   (1) Postage-paid return envelope     Please do your test (the instructions will tell you how) and then return your sample in the postage-paid return envelope within 24 hours of collection.     If your test results are negative, you won't need testing again for " "another year.  If results show you need more testing, we will call you with next steps.     Regular colorectal cancer screening is one of the most powerful weapons for preventing colon cancer.  The website https://www.cancer.org/cancer/colon-rectal-cancer.html can answer many of your questions about this cancer and its prevention.  Just search for "colorectal cancer".   If you have any questions please call [facility] [phone] for assistance.     If you have had any of the above done at another facility, please bring the records or information with you so that your record at [facility] will be complete.       If you are currently taking medication, please bring it with you to your appointment for review.     Also, if you have any type of Advanced Directives, please bring them with you to your office visit so we may scan them into your chart.         Clara Reeder LPN Clinical Care Coordinator   [facility] [address] [place] [address]   Phone [phone]   Fax [phone]        "

## 2019-03-01 DIAGNOSIS — M54.12 CERVICAL RADICULOPATHY: Primary | ICD-10-CM

## 2019-03-04 ENCOUNTER — HOSPITAL ENCOUNTER (OUTPATIENT)
Dept: RADIOLOGY | Facility: CLINIC | Age: 59
Discharge: HOME | End: 2019-03-04
Attending: NURSE PRACTITIONER
Payer: COMMERCIAL

## 2019-03-04 DIAGNOSIS — Z12.39 ENCOUNTER FOR SPECIAL SCREENING EXAMINATION FOR NEOPLASM OF BREAST: ICD-10-CM

## 2019-03-04 PROCEDURE — 77063 MAMMO DIGITAL SCREENING BILAT WITH TOMOSYNTHESIS_CAD: ICD-10-PCS | Mod: 26,,, | Performed by: RADIOLOGY

## 2019-03-04 PROCEDURE — 77067 SCR MAMMO BI INCL CAD: CPT | Mod: TC,PO

## 2019-03-04 PROCEDURE — 77067 SCR MAMMO BI INCL CAD: CPT | Mod: 26,,, | Performed by: RADIOLOGY

## 2019-03-04 PROCEDURE — 77063 BREAST TOMOSYNTHESIS BI: CPT | Mod: 26,,, | Performed by: RADIOLOGY

## 2019-03-04 PROCEDURE — 77067 MAMMO DIGITAL SCREENING BILAT WITH TOMOSYNTHESIS_CAD: ICD-10-PCS | Mod: 26,,, | Performed by: RADIOLOGY

## 2019-03-12 ENCOUNTER — DOCUMENTATION ONLY (OUTPATIENT)
Dept: FAMILY MEDICINE | Facility: CLINIC | Age: 59
End: 2019-03-12

## 2019-03-12 NOTE — PROGRESS NOTES
Pre-Visit Chart Review  For Appointment Scheduled on 3/13/19    Health Maintenance Due   Topic Date Due    Pneumococcal Vaccine (Highest Risk) (1 of 3 - PCV13) 11/30/1978    Colonoscopy  11/30/2009

## 2019-03-13 ENCOUNTER — PATIENT MESSAGE (OUTPATIENT)
Dept: OBSTETRICS AND GYNECOLOGY | Facility: CLINIC | Age: 59
End: 2019-03-13

## 2019-03-13 ENCOUNTER — TELEPHONE (OUTPATIENT)
Dept: OBSTETRICS AND GYNECOLOGY | Facility: CLINIC | Age: 59
End: 2019-03-13

## 2019-03-13 ENCOUNTER — OFFICE VISIT (OUTPATIENT)
Dept: FAMILY MEDICINE | Facility: CLINIC | Age: 59
End: 2019-03-13
Payer: COMMERCIAL

## 2019-03-13 ENCOUNTER — PATIENT MESSAGE (OUTPATIENT)
Dept: INFECTIOUS DISEASES | Facility: CLINIC | Age: 59
End: 2019-03-13

## 2019-03-13 VITALS
WEIGHT: 196.63 LBS | SYSTOLIC BLOOD PRESSURE: 121 MMHG | DIASTOLIC BLOOD PRESSURE: 80 MMHG | TEMPERATURE: 98 F | HEIGHT: 66 IN | HEART RATE: 82 BPM | BODY MASS INDEX: 31.6 KG/M2

## 2019-03-13 DIAGNOSIS — M54.12 CERVICAL RADICULAR PAIN: ICD-10-CM

## 2019-03-13 DIAGNOSIS — Z91.89 INCREASED RISK OF BREAST CANCER: Primary | ICD-10-CM

## 2019-03-13 DIAGNOSIS — G47.00 INSOMNIA, UNSPECIFIED TYPE: Primary | ICD-10-CM

## 2019-03-13 PROCEDURE — 99396 PREV VISIT EST AGE 40-64: CPT | Mod: S$GLB,,, | Performed by: FAMILY MEDICINE

## 2019-03-13 PROCEDURE — 99999 PR PBB SHADOW E&M-EST. PATIENT-LVL IV: ICD-10-PCS | Mod: PBBFAC,,, | Performed by: FAMILY MEDICINE

## 2019-03-13 PROCEDURE — 99999 PR PBB SHADOW E&M-EST. PATIENT-LVL IV: CPT | Mod: PBBFAC,,, | Performed by: FAMILY MEDICINE

## 2019-03-13 PROCEDURE — 99396 PR PREVENTIVE VISIT,EST,40-64: ICD-10-PCS | Mod: S$GLB,,, | Performed by: FAMILY MEDICINE

## 2019-03-13 RX ORDER — HYDROXYZINE HYDROCHLORIDE 25 MG/1
25 TABLET, FILM COATED ORAL NIGHTLY
Qty: 90 TABLET | Refills: 3 | Status: SHIPPED | OUTPATIENT
Start: 2019-03-13 | End: 2019-09-10

## 2019-03-13 RX ORDER — GABAPENTIN 300 MG/1
300 CAPSULE ORAL 2 TIMES DAILY
Qty: 60 CAPSULE | Refills: 11 | Status: SHIPPED | OUTPATIENT
Start: 2019-03-13 | End: 2019-05-09

## 2019-03-13 NOTE — PROGRESS NOTES
Subjective:   Patient ID: Bing Burch is a 58 y.o. female     Chief Complaint:Establish Care      Patient here for annual checkup and to establish care.  Patient endorses significant neck pain which has been going on for the past 3 months and recently became worse after riding on a float.  Patient is currently being seen by Neurosurgery.      Review of Systems   Constitutional: Negative for chills.   HENT: Negative for sore throat.    Eyes: Negative for visual disturbance.   Respiratory: Negative for shortness of breath.    Endocrine: Negative for polyuria.   Genitourinary: Negative for hematuria.   Skin: Negative for color change.   Psychiatric/Behavioral: Negative for agitation and confusion.     Past Medical History:   Diagnosis Date    GERD (gastroesophageal reflux disease)     Irritable bowel syndrome     mixed type, with slightly predominant constipation     Objective:     Vitals:    03/13/19 0728   BP: 121/80   Pulse: 82   Temp: 97.9 °F (36.6 °C)     Body mass index is 31.74 kg/m².  Physical Exam   Constitutional: No distress.   HENT:   Head: Normocephalic and atraumatic.   Eyes: EOM are normal.   Cardiovascular: Normal rate and normal heart sounds.   Pulmonary/Chest: Effort normal.   Abdominal: Bowel sounds are normal.   Musculoskeletal: Normal range of motion.   Neurological: She is alert.   Psychiatric: She has a normal mood and affect.     Assessment:     1. Insomnia, unspecified type    2. Cervical radicular pain      Plan:   Insomnia, unspecified type  -     hydrOXYzine HCl (ATARAX) 25 MG tablet; Take 1 tablet (25 mg total) by mouth every evening.  Dispense: 90 tablet; Refill: 3    Cervical radicular pain  -     gabapentin (NEURONTIN) 300 MG capsule; Take 1 capsule (300 mg total) by mouth 2 (two) times daily.  Dispense: 60 capsule; Refill: 11  Will increase the dose of patient's gabapentin from nightly to twice daily.  Will also consider increase the dose further if patient notices  improvement versus switching to another nerve pain medications such as Lyrica.    Patient's health maintenance is currently up-to-date.  Patient states she had a colonoscopy around 9 years ago and troughs in [place].  Will attempt for records.  Patient states her next colonoscopy is due 2020.      Time spent with patient: 30 minutes and over half of that time was spent on counseling an coordination of care.    Tej Blankenship MD  03/13/2019    Portions of this note have been dictated with DONA Camacho

## 2019-03-14 ENCOUNTER — HOSPITAL ENCOUNTER (OUTPATIENT)
Facility: AMBULARY SURGERY CENTER | Age: 59
Discharge: HOME | End: 2019-03-14
Attending: ANESTHESIOLOGY | Admitting: ANESTHESIOLOGY
Payer: COMMERCIAL

## 2019-03-14 ENCOUNTER — PATIENT OUTREACH (OUTPATIENT)
Dept: ADMINISTRATIVE | Facility: HOSPITAL | Age: 59
End: 2019-03-14

## 2019-03-14 DIAGNOSIS — M54.12 CERVICAL RADICULITIS: Primary | ICD-10-CM

## 2019-03-14 PROCEDURE — 62321 NJX INTERLAMINAR CRV/THRC: CPT | Performed by: ANESTHESIOLOGY

## 2019-03-14 PROCEDURE — 62321 NJX INTERLAMINAR CRV/THRC: CPT | Mod: ,,, | Performed by: ANESTHESIOLOGY

## 2019-03-14 PROCEDURE — 99152 MOD SED SAME PHYS/QHP 5/>YRS: CPT | Mod: ,,, | Performed by: ANESTHESIOLOGY

## 2019-03-14 PROCEDURE — 62321 PR INJ CERV/THORAC, W/GUIDANCE: ICD-10-PCS | Mod: ,,, | Performed by: ANESTHESIOLOGY

## 2019-03-14 PROCEDURE — 99152 PR MOD CONSCIOUS SEDATION, SAME PHYS, 5+ YRS, FIRST 15 MIN: ICD-10-PCS | Mod: ,,, | Performed by: ANESTHESIOLOGY

## 2019-03-14 RX ORDER — FENTANYL CITRATE 50 UG/ML
INJECTION, SOLUTION INTRAMUSCULAR; INTRAVENOUS
Status: DISCONTINUED | OUTPATIENT
Start: 2019-03-14 | End: 2019-03-14 | Stop reason: HOSPADM

## 2019-03-14 RX ORDER — SODIUM CHLORIDE 9 MG/ML
INJECTION, SOLUTION INTRAMUSCULAR; INTRAVENOUS; SUBCUTANEOUS
Status: DISCONTINUED | OUTPATIENT
Start: 2019-03-14 | End: 2019-03-14 | Stop reason: HOSPADM

## 2019-03-14 RX ORDER — MIDAZOLAM HYDROCHLORIDE 2 MG/2ML
INJECTION, SOLUTION INTRAMUSCULAR; INTRAVENOUS
Status: DISCONTINUED | OUTPATIENT
Start: 2019-03-14 | End: 2019-03-14 | Stop reason: HOSPADM

## 2019-03-14 RX ORDER — DEXAMETHASONE SODIUM PHOSPHATE 10 MG/ML
INJECTION INTRAMUSCULAR; INTRAVENOUS
Status: DISCONTINUED
Start: 2019-03-14 | End: 2019-03-14 | Stop reason: HOSPADM

## 2019-03-14 RX ORDER — SODIUM CHLORIDE, SODIUM LACTATE, POTASSIUM CHLORIDE, CALCIUM CHLORIDE 600; 310; 30; 20 MG/100ML; MG/100ML; MG/100ML; MG/100ML
INJECTION, SOLUTION INTRAVENOUS CONTINUOUS
Status: DISCONTINUED | OUTPATIENT
Start: 2019-03-14 | End: 2019-03-14 | Stop reason: HOSPADM

## 2019-03-14 RX ORDER — LIDOCAINE HYDROCHLORIDE 10 MG/ML
INJECTION, SOLUTION EPIDURAL; INFILTRATION; INTRACAUDAL; PERINEURAL
Status: DISCONTINUED | OUTPATIENT
Start: 2019-03-14 | End: 2019-03-14 | Stop reason: HOSPADM

## 2019-03-14 RX ORDER — DEXAMETHASONE SODIUM PHOSPHATE 10 MG/ML
INJECTION INTRAMUSCULAR; INTRAVENOUS
Status: DISCONTINUED | OUTPATIENT
Start: 2019-03-14 | End: 2019-03-14 | Stop reason: HOSPADM

## 2019-03-14 RX ADMIN — SODIUM CHLORIDE, SODIUM LACTATE, POTASSIUM CHLORIDE, CALCIUM CHLORIDE: 600; 310; 30; 20 INJECTION, SOLUTION INTRAVENOUS at 01:03

## 2019-03-14 NOTE — PLAN OF CARE
Stable, states ready to go home, eric po fluids, pain tolerable, able to hold legs off bed and stand, ambulated to car with RN to

## 2019-03-14 NOTE — OP NOTE
PROCEDURE DATE: 3/14/2019    Procedure: C7-T1 cervical interlaminar epidural steroid injection under utilizing fluoroscopy.    Diagnosis: Cervical Degenerative Disc Diease; Cervical Radiculitis  POSTOP DIAGNOSIS: SAME    Physician: Devaughn Drew MD    Medications injected:  Dexamethasone 10mg followed by a slow injection of 4 mL sterile, preservative-free normal saline.    Local anesthetic used: Lidocaine 1%, 2 ml.    Sedation Medications: RN IV sedation    Complications:  None    Estimated blood loss: None    Technique:  A time-out was taken to identify patient and procedure prior to starting the procedure.  With the patient laying in a prone position with the neck in a mid-flexed forward position, the area was prepped and draped in the usual sterile fashion using ChloraPrep and a fenestrated drape.  The area was determined under AP fluoroscopic guidance.  Local anesthetic was given using a 25-gauge 1.5 inch needle by raising a wheal and then infiltrating ventrally.  A 3.5 inch 20-gauge Touhy needle was introduced under fluoroscopic guidance to meet the lamina of C7.  The needle was then hinged under the lamina then advanced using loss of resistance technique.  Once the tip of the needle was in the desired position, the 1ml contrast dye Omnipaque was injected to determine placement and no uptake.  The steroid was then injected slowly followed by a slow injection of 4 mL of the sterile preservative-free normal saline.  The patient tolerated the procedure well.    The patient was monitored after the procedure and was given post-procedure and discharge instructions to follow at home. The patient was discharged in a stable condition.

## 2019-03-14 NOTE — DISCHARGE SUMMARY
[facility]  Discharge Note  Short Stay    Admit Date: 3/14/2019    Discharge Date and Time: 3/14/2019    Attending Physician: Devaughn Drew MD     Discharge Provider: Devaughn Drew    Diagnoses:  Active Hospital Problems    Diagnosis  POA    *Cervical radiculitis [M54.12]  Yes      Resolved Hospital Problems   No resolved problems to display.       Hospital Course: Cervical GUILLERMO  Discharged Condition: Good    Final Diagnoses:   Active Hospital Problems    Diagnosis  POA    *Cervical radiculitis [M54.12]  Yes      Resolved Hospital Problems   No resolved problems to display.       Disposition: Home or Self Care    Follow up/Patient Instructions:    Medications:  Reconciled Home Medications:      Medication List      CONTINUE taking these medications    bacitracin 500 unit/gram ointment  Apply topically 2 (two) times daily.     buPROPion 300 MG 24 hr tablet  Commonly known as:  WELLBUTRIN XL  Take 1 tablet (300 mg total) by mouth once daily.     calcium carbonate 500 mg calcium (1,250 mg) chewable tablet  Commonly known as:  OS-ALLEY  Take 1 tablet by mouth once daily.     diazePAM 5 MG tablet  Commonly known as:  VALIUM  Take 1 tablet (5 mg total) by mouth every 6 (six) hours as needed (muscle spasm).     esomeprazole 40 MG capsule  Commonly known as:  NEXIUM  Take 1 capsule (40 mg total) by mouth 2 (two) times daily.     gabapentin 300 MG capsule  Commonly known as:  NEURONTIN  Take 1 capsule (300 mg total) by mouth 2 (two) times daily.     hydrOXYzine HCl 25 MG tablet  Commonly known as:  ATARAX  Take 1 tablet (25 mg total) by mouth every evening.     mometasone 50 mcg/actuation nasal spray  Commonly known as:  NASONEX  2 sprays by Nasal route daily as needed (allergies).     MOTRIN ORAL  Take by mouth.     ondansetron 4 MG Tbdl  Commonly known as:  ZOFRAN-ODT  Take 1 tablet (4 mg total) by mouth every 8 (eight) hours as needed.          Discharge Procedure Orders   Call MD for:  temperature >100.4     Call MD  for:  persistent nausea and vomiting or diarrhea     Call MD for:  severe uncontrolled pain     Call MD for:  redness, tenderness, or signs of infection (pain, swelling, redness, odor or green/yellow discharge around incision site)     Call MD for:  difficulty breathing or increased cough     Call MD for:  severe persistent headache        Follow up with MD in 2-3 weeks    Discharge Procedure Orders (must include Diet, Follow-up, Activity):   Discharge Procedure Orders (must include Diet, Follow-up, Activity)   Call MD for:  temperature >100.4     Call MD for:  persistent nausea and vomiting or diarrhea     Call MD for:  severe uncontrolled pain     Call MD for:  redness, tenderness, or signs of infection (pain, swelling, redness, odor or green/yellow discharge around incision site)     Call MD for:  difficulty breathing or increased cough     Call MD for:  severe persistent headache

## 2019-03-15 ENCOUNTER — TELEPHONE (OUTPATIENT)
Dept: ADMINISTRATIVE | Facility: HOSPITAL | Age: 59
End: 2019-03-15

## 2019-03-18 VITALS
RESPIRATION RATE: 20 BRPM | WEIGHT: 199 LBS | HEART RATE: 76 BPM | OXYGEN SATURATION: 97 % | TEMPERATURE: 98 F | HEIGHT: 66 IN | BODY MASS INDEX: 31.98 KG/M2 | DIASTOLIC BLOOD PRESSURE: 68 MMHG | SYSTOLIC BLOOD PRESSURE: 119 MMHG

## 2019-03-20 RX ORDER — METAXALONE 800 MG/1
800 TABLET ORAL 3 TIMES DAILY PRN
Qty: 30 TABLET | Refills: 0 | Status: SHIPPED | OUTPATIENT
Start: 2019-03-20 | End: 2019-07-08

## 2019-03-22 ENCOUNTER — OFFICE VISIT (OUTPATIENT)
Dept: PLASTIC SURGERY | Facility: CLINIC | Age: 59
End: 2019-03-22
Payer: COMMERCIAL

## 2019-03-22 VITALS
DIASTOLIC BLOOD PRESSURE: 76 MMHG | HEIGHT: 66 IN | WEIGHT: 196.88 LBS | BODY MASS INDEX: 31.64 KG/M2 | SYSTOLIC BLOOD PRESSURE: 122 MMHG | HEART RATE: 96 BPM

## 2019-03-22 DIAGNOSIS — Z09 SURGERY FOLLOW-UP EXAMINATION: Primary | ICD-10-CM

## 2019-03-22 PROCEDURE — 99999 PR PBB SHADOW E&M-EST. PATIENT-LVL III: ICD-10-PCS | Mod: PBBFAC,,, | Performed by: SURGERY

## 2019-03-22 PROCEDURE — 99999 PR PBB SHADOW E&M-EST. PATIENT-LVL III: CPT | Mod: PBBFAC,,, | Performed by: SURGERY

## 2019-03-22 PROCEDURE — 99024 PR POST-OP FOLLOW-UP VISIT: ICD-10-PCS | Mod: S$GLB,,, | Performed by: SURGERY

## 2019-03-22 PROCEDURE — 99024 POSTOP FOLLOW-UP VISIT: CPT | Mod: S$GLB,,, | Performed by: SURGERY

## 2019-04-04 ENCOUNTER — OFFICE VISIT (OUTPATIENT)
Dept: SPINE | Facility: CLINIC | Age: 59
End: 2019-04-04
Payer: COMMERCIAL

## 2019-04-04 VITALS — HEIGHT: 66 IN | WEIGHT: 196.88 LBS | BODY MASS INDEX: 31.64 KG/M2

## 2019-04-04 DIAGNOSIS — M54.12 CERVICAL RADICULITIS: ICD-10-CM

## 2019-04-04 DIAGNOSIS — M54.2 CERVICALGIA: Primary | ICD-10-CM

## 2019-04-04 PROCEDURE — 3008F PR BODY MASS INDEX (BMI) DOCUMENTED: ICD-10-PCS | Mod: ,,, | Performed by: PHYSICAL MEDICINE & REHABILITATION

## 2019-04-04 PROCEDURE — 3008F BODY MASS INDEX DOCD: CPT | Mod: ,,, | Performed by: PHYSICAL MEDICINE & REHABILITATION

## 2019-04-04 PROCEDURE — 99213 PR OFFICE/OUTPT VISIT, EST, LEVL III, 20-29 MIN: ICD-10-PCS | Mod: ,,, | Performed by: PHYSICAL MEDICINE & REHABILITATION

## 2019-04-04 PROCEDURE — 99213 OFFICE O/P EST LOW 20 MIN: CPT | Mod: ,,, | Performed by: PHYSICAL MEDICINE & REHABILITATION

## 2019-04-04 NOTE — PROGRESS NOTES
SUBJECTIVE:    Patient ID: Bing Burch is a 58 y.o. female.    Chief Complaint: Follow-up (post procedure )    She is here for follow-up status post C7-T1 interlaminar epidural steroid injection on 2019 with Dr. Drew.  She had a good result from that procedure and has significant Catherine less of this stabbing discomfort in the posterior neck.  The right arm radicular pains have improved.  Overall she feels 75% better.  She does however have persistent intermittent neck tightness and also complains of tingling in both hands right greater than left.  In addition to that she complains of numbness and tingling in both feet that has been going on for just a few weeks.  No associated radicular symptoms.  No known history of back problems.  She says she has a history of psoriasis but no recent flare ups.  She complains of joint discomfort in the hands and also in the feet, the forefeet especially when she 1st wakes up in the morning.        Past Medical History:   Diagnosis Date    GERD (gastroesophageal reflux disease)     Irritable bowel syndrome     mixed type, with slightly predominant constipation     Social History     Socioeconomic History    Marital status:      Spouse name: Not on file    Number of children: Not on file    Years of education: Not on file    Highest education level: Not on file   Occupational History    Not on file   Social Needs    Financial resource strain: Not on file    Food insecurity:     Worry: Not on file     Inability: Not on file    Transportation needs:     Medical: Not on file     Non-medical: Not on file   Tobacco Use    Smoking status: Former Smoker     Last attempt to quit: 1989     Years since quittin.5    Smokeless tobacco: Never Used   Substance and Sexual Activity    Alcohol use: Yes     Comment: occasionally    Drug use: No    Sexual activity: Not Currently   Lifestyle    Physical activity:     Days per week: Not on file     Minutes  "per session: Not on file    Stress: Not on file   Relationships    Social connections:     Talks on phone: Not on file     Gets together: Not on file     Attends Worship service: Not on file     Active member of club or organization: Not on file     Attends meetings of clubs or organizations: Not on file     Relationship status: Not on file   Other Topics Concern    Are you pregnant or think you may be? Not Asked    Breast-feeding Not Asked   Social History Narrative    Not on file     Past Surgical History:   Procedure Laterality Date    CLOSURE, MOHS PROCEDURE DEFECT - left nose Left 2/4/2019    [facility] 2ND [facility]    ESOPHAGOGASTRODUODENOSCOPY (EGD) N/A 8/18/2017    [facility]O    HERNIA REPAIR      hiatal hernia repair in 2013      Injection-steroid-epidural-cervical N/A 3/14/2019    [facility]    SINUS SURGERY      TONSILLECTOMY      TUMOR EXCISION       Family History   Problem Relation Age of Onset    Psoriasis Brother     Breast cancer Mother 55    Eczema Neg Hx     Lupus Neg Hx     Melanoma Neg Hx      Vitals:    04/04/19 1027   Weight: 89.3 kg (196 lb 13.9 oz)   Height: 5' 6" (1.676 m)       Review of Systems   Constitutional: Negative for chills, diaphoresis, fatigue, fever and unexpected weight change.   HENT: Negative for trouble swallowing.    Eyes: Negative for visual disturbance.   Respiratory: Negative for shortness of breath.    Cardiovascular: Negative for chest pain.   Gastrointestinal: Negative for abdominal pain, constipation, nausea and vomiting.   Genitourinary: Negative for difficulty urinating.   Musculoskeletal: Negative for arthralgias, back pain, gait problem, joint swelling, myalgias, neck pain and neck stiffness.   Neurological: Negative for dizziness, speech difficulty, weakness, light-headedness, numbness and headaches.          Objective:      Physical Exam "   Constitutional: She appears well-developed and well-nourished.   Musculoskeletal:   She has no joint swelling in the hands or feet.  No active synovitis.  No increased warmth or erythema  Pulses in the lower  Extremities are normal.  She has no point tenderness over the plantar fascia.  No interdigital tenderness           Assessment:       1. Cervicalgia    2. Cervical radiculitis           Plan:     she has improved following epidural steroid injection but has persistent neck stiffness and numbness and tingling in both hands and feet.  I recommend EMG and nerve conduction studies to ascertain whether there is a radicular component to her pain.  She may be a neurosurgical candidate.  With regards to the discomfort in the feet, examination is benign.  Not sure what is going on there.  To orally for EMG and nerve conduction studies to really show anything.  If her symptoms persist we may go back and do those studies at a later day.  I will see her back after the EMG and nerve conduction      Cervicalgia    Cervical radiculitis  -     EMG W/ ULTRASOUND AND NERVE CONDUCTION TEST 2 Extremities; Future

## 2019-04-05 ENCOUNTER — TELEPHONE (OUTPATIENT)
Dept: SPINE | Facility: CLINIC | Age: 59
End: 2019-04-05

## 2019-04-05 NOTE — TELEPHONE ENCOUNTER
Spoke with Cynthia in pre service and let her know I would give the message to Dr. Morales's nurse, Leanna, and she indicated understanding.

## 2019-04-05 NOTE — TELEPHONE ENCOUNTER
----- Message from Stacey M Lefort sent at 4/5/2019 10:55 AM CDT -----  Cynthia call from pre service - she has questions regarding this patient and the emg that needs to be scheduled.  Cynthia can be reached at [phone].  Thank you

## 2019-04-11 ENCOUNTER — OFFICE VISIT (OUTPATIENT)
Dept: DERMATOLOGY | Facility: CLINIC | Age: 59
End: 2019-04-11
Payer: COMMERCIAL

## 2019-04-11 VITALS — HEIGHT: 66 IN | WEIGHT: 196 LBS | BODY MASS INDEX: 31.5 KG/M2

## 2019-04-11 DIAGNOSIS — D22.9 MULTIPLE BENIGN NEVI: ICD-10-CM

## 2019-04-11 DIAGNOSIS — D18.01 CHERRY ANGIOMA: ICD-10-CM

## 2019-04-11 DIAGNOSIS — L81.4 SOLAR LENTIGO: ICD-10-CM

## 2019-04-11 DIAGNOSIS — Z85.828 HISTORY OF NONMELANOMA SKIN CANCER: Primary | ICD-10-CM

## 2019-04-11 DIAGNOSIS — L82.1 SEBORRHEIC KERATOSES: ICD-10-CM

## 2019-04-11 PROCEDURE — 99213 OFFICE O/P EST LOW 20 MIN: CPT | Mod: S$GLB,,, | Performed by: DERMATOLOGY

## 2019-04-11 PROCEDURE — 99213 PR OFFICE/OUTPT VISIT, EST, LEVL III, 20-29 MIN: ICD-10-PCS | Mod: S$GLB,,, | Performed by: DERMATOLOGY

## 2019-04-11 PROCEDURE — 99999 PR PBB SHADOW E&M-EST. PATIENT-LVL III: ICD-10-PCS | Mod: PBBFAC,,, | Performed by: DERMATOLOGY

## 2019-04-11 PROCEDURE — 3008F PR BODY MASS INDEX (BMI) DOCUMENTED: ICD-10-PCS | Mod: CPTII,S$GLB,, | Performed by: DERMATOLOGY

## 2019-04-11 PROCEDURE — 3008F BODY MASS INDEX DOCD: CPT | Mod: CPTII,S$GLB,, | Performed by: DERMATOLOGY

## 2019-04-11 PROCEDURE — 99999 PR PBB SHADOW E&M-EST. PATIENT-LVL III: CPT | Mod: PBBFAC,,, | Performed by: DERMATOLOGY

## 2019-04-11 NOTE — PATIENT INSTRUCTIONS
Sun Protection      The [facility] would like to remind you of the importance of sun protection all year round and particularly during the summer when the suns rays are the strongest. It has been proven that both acute and chronic sun exposure damages our cells and leads to skin cancer. Beyond skin cancer, the sun causes 90% of the symptoms of pre-mature skin aging, including wrinkles, lentigines (brown spots), and thin, easily bruised skin. Proper sun protection can help prevent these unwanted conditions.    Many patients report that the dont go in the sun. It has been shown that the average person receives 18 hours of incidental sun exposure per week during activities such as walking through parking lots, driving, or sitting next to windows. This accumulates to several bad sunburns per year!    In choosing sunscreen, you want one that protects against both UVA and UVB rays. It is recommended that you use one of SPF 30 or higher. It is important to apply the sunscreen about 20 minutes prior to sun exposure. Most sunscreens are chemical sunscreens and a reaction must take place in the skin so that they are effective. If they are applied and then you are immediately exposed to the sun or start sweating, this reaction has not had time to take place and you are therefore unprotected. Sunscreen needs to be reapplied every 2 hours if you are participating in water sports or sweating. We recommend Elta MD or Neutrogena Ultra Sheer Dry Touch SPF 55 for daily use; however there are many options and it is most important for you to find one that you will use on a consistent basis.    If you have sensitive skin, you may do best with a sunscreen that contains only physical blockers such as titanium dioxide or zinc oxide. These are typically thicker and harder to apply, however they afford very good protection. Neutrogena Sensitive Skin, Blue Lizard Sensitive Skin (pink top) or Neutrogena Pure and Free  are popular ones.     Aside from sunscreen, clothes with UV protection, wide brimmed hats, and sunglasses are other means of sun protection that we recommend.                        [facility]  [address] [place] [address] Nic Casanova [id number]  Dept: [phone]

## 2019-04-11 NOTE — PROGRESS NOTES
Subjective:       Patient ID:  Bing Burch is a 58 y.o. female who presents for   Chief Complaint   Patient presents with    Spot     chest, x 1 week, scaly, no tx    Skin Check     TBSE     Patient last seen 10/2018, new dx BCC left alar groove s/p Mohs Dr CARTY with reconstruction by Dr Reddy  H/o AKs treated witch cryo    H/o intense sun exposure, youth in [place], blistering sunburns  No h/o skin cancer,  New complaint today        Spot  - Initial  Affected locations: chest  Duration: 1 week  Signs / symptoms: scaling  Severity: mild  Timing: constant  Aggravated by: nothing  Relieving factors/Treatments tried: nothing        Review of Systems   Constitutional: Negative for fever, chills and fatigue.   Skin: Positive for daily sunscreen use, activity-related sunscreen use and wears hat.   Hematologic/Lymphatic: Does not bruise/bleed easily.        Objective:    Physical Exam   Constitutional: She appears well-developed and well-nourished. No distress.   Neurological: She is alert and oriented to person, place, and time. She is not disoriented.   Psychiatric: She has a normal mood and affect.   Skin:   Areas Examined (abnormalities noted in diagram):   Scalp / Hair Palpated and Inspected  Head / Face Inspection Performed  Neck Inspection Performed  Chest / Axilla Inspection Performed  Abdomen Inspection Performed  Genitals / Buttocks / Groin Inspection Performed  Back Inspection Performed  RUE Inspected  LUE Inspection Performed  RLE Inspected  LLE Inspection Performed  Nails and Digits Inspection Performed                       Diagram Legend     Erythematous scaling macule/papule c/w actinic keratosis       Vascular papule c/w angioma      Pigmented verrucoid papule/plaque c/w seborrheic keratosis      Yellow umbilicated papule c/w sebaceous hyperplasia      Irregularly shaped tan macule c/w lentigo     1-2 mm smooth white papules consistent with Milia      Movable subcutaneous cyst with punctum c/w  epidermal inclusion cyst      Subcutaneous movable cyst c/w pilar cyst      Firm pink to brown papule c/w dermatofibroma      Pedunculated fleshy papule(s) c/w skin tag(s)      Evenly pigmented macule c/w junctional nevus     Mildly variegated pigmented, slightly irregular-bordered macule c/w mildly atypical nevus      Flesh colored to evenly pigmented papule c/w intradermal nevus       Pink pearly papule/plaque c/w basal cell carcinoma      Erythematous hyperkeratotic cursted plaque c/w SCC      Surgical scar with no sign of skin cancer recurrence      Open and closed comedones      Inflammatory papules and pustules      Verrucoid papule consistent consistent with wart     Erythematous eczematous patches and plaques     Dystrophic onycholytic nail with subungual debris c/w onychomycosis     Umbilicated papule    Erythematous-base heme-crusted tan verrucoid plaque consistent with inflamed seborrheic keratosis     Erythematous Silvery Scaling Plaque c/w Psoriasis     See annotation      Assessment / Plan:        History of nonmelanoma skin cancer  Area of previous BCC (left ala)examined. Site well healed with no signs of recurrence.    Total body skin examination performed today including at least 12 points as noted in physical examination. No lesions suspicious for malignancy noted.    Seborrheic keratoses  These are benign inherited growths without a malignant potential. Reassurance given to patient. No treatment is necessary.     Solar lentigo  This is a benign hyperpigmented sun induced lesion. Daily sun protection will reduce the number of new lesions. Treatment of these benign lesions are considered cosmetic.    Cherry angioma  This is a benign vascular lesion. Reassurance given. No treatment required.     Multiple benign nevi  Discussed ABCDE's of nevi.  Monitor for new mole or moles that are becoming bigger, darker, irritated, or developing irregular borders. Brochure provided.    Patient instructed in  importance in daily sun protection of at least spf 30. Mineral sunscreen ingredients preferred (Zinc +/- Titanium).   Recommend Elta MD for daily use on face and neck.  Patient encouraged to wear hat for all outdoor exposure.   Also discussed sun avoidance and use of protective clothing.           Follow up in about 6 months (around 10/11/2019).

## 2019-04-15 ENCOUNTER — DOCUMENTATION ONLY (OUTPATIENT)
Dept: FAMILY MEDICINE | Facility: CLINIC | Age: 59
End: 2019-04-15

## 2019-04-15 NOTE — PROGRESS NOTES
Pre-Visit Chart Review  For Appointment Scheduled on 4/16/2019.      Health Maintenance Due   Topic Date Due    Pneumococcal Vaccine (Highest Risk) (1 of 3 - PCV13) 11/30/1978    Colonoscopy  08/14/2016

## 2019-04-16 ENCOUNTER — DOCUMENTATION ONLY (OUTPATIENT)
Dept: FAMILY MEDICINE | Facility: CLINIC | Age: 59
End: 2019-04-16

## 2019-04-16 ENCOUNTER — OFFICE VISIT (OUTPATIENT)
Dept: FAMILY MEDICINE | Facility: CLINIC | Age: 59
End: 2019-04-16
Payer: COMMERCIAL

## 2019-04-16 ENCOUNTER — HOSPITAL ENCOUNTER (OUTPATIENT)
Dept: RADIOLOGY | Facility: CLINIC | Age: 59
Discharge: HOME | End: 2019-04-16
Attending: PHYSICIAN ASSISTANT
Payer: COMMERCIAL

## 2019-04-16 VITALS
BODY MASS INDEX: 31.71 KG/M2 | TEMPERATURE: 98 F | HEART RATE: 92 BPM | HEIGHT: 66 IN | DIASTOLIC BLOOD PRESSURE: 79 MMHG | WEIGHT: 197.31 LBS | SYSTOLIC BLOOD PRESSURE: 123 MMHG

## 2019-04-16 DIAGNOSIS — S99.921A INJURY OF RIGHT FOOT, INITIAL ENCOUNTER: Primary | ICD-10-CM

## 2019-04-16 DIAGNOSIS — S99.921A INJURY OF RIGHT FOOT, INITIAL ENCOUNTER: ICD-10-CM

## 2019-04-16 DIAGNOSIS — S93.601A SPRAIN OF RIGHT FOOT, INITIAL ENCOUNTER: ICD-10-CM

## 2019-04-16 PROCEDURE — 3008F PR BODY MASS INDEX (BMI) DOCUMENTED: ICD-10-PCS | Mod: CPTII,S$GLB,, | Performed by: PHYSICIAN ASSISTANT

## 2019-04-16 PROCEDURE — 3008F BODY MASS INDEX DOCD: CPT | Mod: CPTII,S$GLB,, | Performed by: PHYSICIAN ASSISTANT

## 2019-04-16 PROCEDURE — 99214 OFFICE O/P EST MOD 30 MIN: CPT | Mod: S$GLB,,, | Performed by: PHYSICIAN ASSISTANT

## 2019-04-16 PROCEDURE — 73630 XR FOOT COMPLETE 3 VIEW RIGHT: ICD-10-PCS | Mod: 26,RT,S$GLB, | Performed by: RADIOLOGY

## 2019-04-16 PROCEDURE — 73630 X-RAY EXAM OF FOOT: CPT | Mod: TC,FY,PO,RT

## 2019-04-16 PROCEDURE — 99999 PR PBB SHADOW E&M-EST. PATIENT-LVL IV: ICD-10-PCS | Mod: PBBFAC,,, | Performed by: PHYSICIAN ASSISTANT

## 2019-04-16 PROCEDURE — 99214 PR OFFICE/OUTPT VISIT, EST, LEVL IV, 30-39 MIN: ICD-10-PCS | Mod: S$GLB,,, | Performed by: PHYSICIAN ASSISTANT

## 2019-04-16 PROCEDURE — 99999 PR PBB SHADOW E&M-EST. PATIENT-LVL IV: CPT | Mod: PBBFAC,,, | Performed by: PHYSICIAN ASSISTANT

## 2019-04-16 PROCEDURE — 73630 X-RAY EXAM OF FOOT: CPT | Mod: 26,RT,S$GLB, | Performed by: RADIOLOGY

## 2019-04-16 NOTE — PATIENT INSTRUCTIONS
Foot Sprain    A sprain is a stretching or tearing of the ligaments that hold a joint together. There are no broken bones. Sprains generally take from 3-6 weeks to heal. A sprain may be treated with a splint, walking cast, or special boot. Mild sprains may not need any additional support.  Home care  The following guidelines will help you care for your injury at home:  · Keep your leg elevated when sitting or lying down. This is very important during the first 48 hours to reduce swelling. Stay off the injured foot as much as possible until you can walk on it without pain. If needed, you may use crutches during the first week for this purpose. Crutches can be rented at many pharmacies or surgical/orthopedic supply stores.  · You may be given a cast shoe to wear to prevent movement in your foot. If not, you can use a sandal or any shoe that does not put pressure on the injured area until the swelling and pain go away. If using a sandal, be careful not to hit your foot against anything, since another injury could make the sprain worse.  · Apply an ice pack over the injured area for 15 to 20 minutes every 3 to 6 hours. You should do this for the first 24 to 48 hours. You can make an ice pack by filling a plastic bag that seals at the top with ice cubes and then wrapping it with a thin towel. Continue to use ice packs for relief of pain and swelling as needed. As the ice melts, avoid getting any wrap, splint, or cast wet. After 48 hours, apply heat from a warm shower or bath for 20 minutes several times daily. Alternating ice and heat may also be helpful.  · You may use over-the-counter pain medicine to control pain, unless another medicine was prescribed. If you have chronic liver or kidney disease or ever had a stomach ulcer or GI bleeding, talk with your healthcare provider before using these medicines.  · If you were given a splint or cast, keep it dry. Bathe with your splint or cast well out of the water,  protected with 2 large plastic bags, rubber-banded at the top end. If a fiberglass splint or cast gets wet, you can dry it with a hair dryer.  · You may return to sports after healing, when you can run without pain.  Follow-up care  Follow up with your healthcare provider as directed. Sometimes fractures dont show up on the first X-ray. Bruises and sprains can sometimes hurt as much as a fracture. These injuries can take time to heal completely. If your symptoms dont improve or they get worse, talk with your healthcare provider. You may need a repeat X-ray.  When to seek medical advice  Call your healthcare provider right away if any of these occur:  · The plaster cast or splint gets wet or soft  · The fiberglass cast or splint gets wet and does not dry for 24 hours  · Pain or swelling increases, or redness appears  · A bad odor comes from within the cast  · Fever of 100.4°F (38°C) or above lasting for 24 to 48 hours  · Toes on the injured foot become cold, blue, numb, or tingly  Date Last Reviewed: 11/20/2015  © 2549-1995 [employer]. [address] [place] [address], [place] [address]. All rights reserved. This information is not intended as a substitute for professional medical care. Always follow your healthcare professional's instructions.
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
## 2019-05-07 ENCOUNTER — DOCUMENTATION ONLY (OUTPATIENT)
Dept: FAMILY MEDICINE | Facility: CLINIC | Age: 59
End: 2019-05-07

## 2019-05-07 NOTE — PROGRESS NOTES
Pre-Visit Chart Review  For Appointment Scheduled on 05/09/2019    Health Maintenance Due   Topic Date Due    Pneumococcal Vaccine (Highest Risk) (1 of 3 - PCV13) 11/30/1978    Colonoscopy  08/14/2016

## 2019-05-09 ENCOUNTER — OFFICE VISIT (OUTPATIENT)
Dept: FAMILY MEDICINE | Facility: CLINIC | Age: 59
End: 2019-05-09
Payer: COMMERCIAL

## 2019-05-09 VITALS
HEIGHT: 66 IN | BODY MASS INDEX: 30.97 KG/M2 | TEMPERATURE: 98 F | HEART RATE: 79 BPM | WEIGHT: 192.69 LBS | DIASTOLIC BLOOD PRESSURE: 78 MMHG | SYSTOLIC BLOOD PRESSURE: 115 MMHG

## 2019-05-09 DIAGNOSIS — S93.609D SPRAIN OF FOOT, UNSPECIFIED LATERALITY, SUBSEQUENT ENCOUNTER: Primary | ICD-10-CM

## 2019-05-09 PROCEDURE — 99999 PR PBB SHADOW E&M-EST. PATIENT-LVL IV: ICD-10-PCS | Mod: PBBFAC,,, | Performed by: PHYSICIAN ASSISTANT

## 2019-05-09 PROCEDURE — 99213 PR OFFICE/OUTPT VISIT, EST, LEVL III, 20-29 MIN: ICD-10-PCS | Mod: S$GLB,,, | Performed by: PHYSICIAN ASSISTANT

## 2019-05-09 PROCEDURE — 3008F PR BODY MASS INDEX (BMI) DOCUMENTED: ICD-10-PCS | Mod: CPTII,S$GLB,, | Performed by: PHYSICIAN ASSISTANT

## 2019-05-09 PROCEDURE — 99213 OFFICE O/P EST LOW 20 MIN: CPT | Mod: S$GLB,,, | Performed by: PHYSICIAN ASSISTANT

## 2019-05-09 PROCEDURE — 3008F BODY MASS INDEX DOCD: CPT | Mod: CPTII,S$GLB,, | Performed by: PHYSICIAN ASSISTANT

## 2019-05-09 PROCEDURE — 99999 PR PBB SHADOW E&M-EST. PATIENT-LVL IV: CPT | Mod: PBBFAC,,, | Performed by: PHYSICIAN ASSISTANT

## 2019-05-09 NOTE — PROGRESS NOTES
Subjective:       Patient ID: Bing Burch is a 58 y.o. female.    Chief Complaint: Follow-up (right foot injury )    Foot Injury    The incident occurred 4 weeks ago The incident occurred at home. The injury mechanism was a fall and an inversion injury. The pain is present in the right foot. The quality of the pain is described as aching. The pain is at a severity of 1/10. The pain is mild. The pain has significantly improved.  Pertinent negatives include no inability to bear weight, loss of motion, loss of sensation, muscle weakness, numbness or tingling. She reports no foreign bodies present. The symptoms are aggravated by weight bearing, palpation and movement.  NSAIDs improved pain.   Patients patient medical/surgical, social and family histories have been reviewed          Review of Systems   Constitutional: Negative for activity change and unexpected weight change.   HENT: Negative for hearing loss, rhinorrhea and trouble swallowing.    Eyes: Negative for discharge and visual disturbance.   Respiratory: Negative for chest tightness and wheezing.    Cardiovascular: Negative for chest pain and palpitations.   Gastrointestinal: Negative for blood in stool, constipation, diarrhea and vomiting.   Endocrine: Negative for polydipsia and polyuria.   Genitourinary: Negative for difficulty urinating, dysuria, hematuria and menstrual problem.   Musculoskeletal: Positive for arthralgias, joint swelling and neck pain.   Neurological: Negative for weakness and headaches.   Psychiatric/Behavioral: Negative for confusion and dysphoric mood.       Objective:      Physical Exam   Constitutional: She appears well-developed and well-nourished. No distress.   Musculoskeletal:        Right foot: There is normal range of motion, no tenderness, no bony tenderness, no swelling and normal capillary refill.        Feet:    Skin: Skin is warm and dry.   Vitals reviewed.      Assessment:       1. Sprain of foot, unspecified  laterality, subsequent encounter        Plan:       Bing was seen today for follow-up.    Diagnoses and all orders for this visit:    Sprain of foot, unspecified laterality, subsequent encounter, resolved    No further management needed    Of note patient has colonoscopy due for health maintenance.  She states that she tried to schedule the colonoscopy but was told that she needed to wait another 2 years.  She has a fit kit at home and will return this soon

## 2019-05-13 ENCOUNTER — PROCEDURE VISIT (OUTPATIENT)
Dept: PHYSICAL MEDICINE AND REHAB | Facility: CLINIC | Age: 59
End: 2019-05-13
Payer: COMMERCIAL

## 2019-05-13 DIAGNOSIS — M54.12 CERVICAL RADICULITIS: ICD-10-CM

## 2019-05-13 PROCEDURE — 95910 NRV CNDJ TEST 7-8 STUDIES: CPT | Mod: S$GLB,,, | Performed by: PHYSICAL MEDICINE & REHABILITATION

## 2019-05-13 PROCEDURE — 95910 PR NERVE CONDUCTION STUDY; 7-8 STUDIES: ICD-10-PCS | Mod: S$GLB,,, | Performed by: PHYSICAL MEDICINE & REHABILITATION

## 2019-05-13 PROCEDURE — 95886 MUSC TEST DONE W/N TEST COMP: CPT | Mod: S$GLB,,, | Performed by: PHYSICAL MEDICINE & REHABILITATION

## 2019-05-13 PROCEDURE — 95886 PR EMG COMPLETE, W/ NERVE CONDUCTION STUDIES, 5+ MUSCLES: ICD-10-PCS | Mod: S$GLB,,, | Performed by: PHYSICAL MEDICINE & REHABILITATION

## 2019-05-13 NOTE — PROCEDURES
Procedures     [facility]  Clotilde rBown D.O.  Physical Medicine and Rehab  Phone: [phone]  Fax: [phone]    Neurography & Electromyography Report              Patient: Bing Burch   Patient ID: [id number]   Sex:     Date of Birth:     Age:     Notes:     Last visit date: 5/13/2019             Visit date and time: 5/13/2019 07:44   Patient Age on Visit Date:     Referring Physician:     Diagnoses:        Right arm pain       Sensory NCS      Nerve / Sites Rec. Site Onset Lat Peak Lat Ref. NP Amp Ref. PP Amp Ref. Segments Distance Velocity     ms ms ms µV µV µV µV  cm m/s   R Median - Digit II (Antidromic)      Wrist Dig II 2.55 3.28 ?3.40 42.6 ?15.0 31.9 ?20.0 Wrist - Dig II 13 51   L Median - Digit II (Antidromic)      Wrist Dig II 1.98 2.86 ?3.40 42.4 ?15.0 46.7 ?20.0 Wrist - Dig II 13 66   R Ulnar - Digit V (Antidromic)      Wrist Dig V 2.08 2.71 ?3.10 30.5 ?10.0 46.4 ?15.0 Wrist - Dig V 11 53   L Ulnar - Digit V (Antidromic)      Wrist Dig V 2.14 2.76 ?3.10 27.5 ?10.0 27.6 ?15.0 Wrist - Dig V 11 52           Motor NCS      Nerve / Sites Muscle Latency Ref. Amplitude Ref. Amp % Duration Segments Distance Lat Diff Velocity Ref.     ms ms mV mV % ms  cm ms m/s m/s   L Median - APB      Wrist APB 3.02 ?4.40 11.9 ?4.0 100 6.77 Wrist - APB 7         Elbow APB 6.93  9.1  76.8 7.29 Elbow - Wrist 23 3.91 59 ?49   R Median - APB      Wrist APB 3.02 ?4.40 9.2 ?4.0 100 6.35 Wrist - APB 7         Elbow APB 6.72  6.6  71.8 8.54 Elbow - Wrist 23 3.70 62 ?49   L Ulnar - ADM      Wrist ADM 2.60 ?3.60 8.1 ?5.0 100 8.07 Wrist - ADM 7         B.Elbow ADM 6.35  7.5  91.9 7.40 B.Elbow - Wrist 20 3.75 53 ?49      A.Elbow ADM 7.86  8.2  101 7.29 A.Elbow - B.Elbow 10 1.51 66 ?49   R Ulnar - ADM      Wrist ADM 2.50 ?3.60 7.8 ?5.0 100 6.20 Wrist - ADM 7         B.Elbow ADM 5.89  7.2  92.4 6.09 B.Elbow - Wrist 20.5 3.39 61 ?49      A.Elbow ADM 7.66  7.0  89.4 6.09 A.Elbow - B.Elbow 10 1.77 56 ?49           EMG  Summary Table     Spontaneous MUAP Recruitment   Muscle IA Fib PSW Fasc H.F. Amp Dur. PPP Pattern   L. Triceps brachii N None None None None 1+ 1+ 1+ Reduced   R. Triceps brachii N None None None None N N N N   L. Deltoid N None None None None N N N N   R. Deltoid N None None None None N N N N   L. Biceps brachii N None None None None N N N N   R. Biceps brachii N None None None None N N N N   L. Extensor carpi radialis brevis N None None None None 1+ 1+ 1+ Reduced   R. Extensor carpi radialis brevis N None None None None N N N N   L. Pronator teres N None None None None 1+ 1+ 1+ Reduced   L. Brachioradialis N None None None None N N N N   R. Brachioradialis N None None None None N N N N   L. First dorsal interosseous N None None None None N N N N   R. First dorsal interosseous N None None None None N N N N   L. Cervical paraspinals (mid) 1+ None None None None N N N N   R. Cervical paraspinals (mid) N None None None None N N N N   L. Cervical paraspinals (low) 1+ None None None None N N N N   R. Cervical paraspinals (low) N None None None None N N N N           Summary    The motor conduction test was normal in all 4 of the tested nerves: L Median - APB, R Median - APB, L Ulnar - ADM, R Ulnar - ADM.    The sensory conduction test was normal in all 4 of the tested nerves: R Median - Digit II (Antidromic), L Median - Digit II (Antidromic), R Ulnar - Digit V (Antidromic), L Ulnar - Digit V (Antidromic).    The needle EMG examination was performed in 17 muscles. It was normal in 12 muscle(s): R. Triceps brachii, L. Deltoid, R. Deltoid, L. Biceps brachii, R. Biceps brachii, R. Extensor carpi radialis brevis, L. Brachioradialis, R. Brachioradialis, L. First dorsal interosseous, R. First dorsal interosseous, R. Cervical paraspinals (mid), R. Cervical paraspinals (low). The study was abnormal in 5 muscle(s), with the following distribution:   Abnormal spontaneous/insertional activity was found in L. Cervical paraspinals  (mid), L. Cervical paraspinals (low).   The MUP waveform abnormality was found in L. Triceps brachii, L. Extensor carpi radialis brevis, L. Pronator teres.   Abnormal interference pattern was found in L. Triceps brachii, L. Extensor carpi radialis brevis, L. Pronator teres.              Conclusion:     Subtle changes in the contralateral side of her pain were noted.  The findings were indicative of a very mild Left C6 chronic radiculopathy          ____________________________  Clotilde Brown D.O.

## 2019-05-15 ENCOUNTER — TELEPHONE (OUTPATIENT)
Dept: SPINE | Facility: CLINIC | Age: 59
End: 2019-05-15

## 2019-05-15 PROCEDURE — 82274 ASSAY TEST FOR BLOOD FECAL: CPT

## 2019-05-16 ENCOUNTER — LAB VISIT (OUTPATIENT)
Dept: LAB | Facility: HOSPITAL | Age: 59
End: 2019-05-16
Attending: PHYSICIAN ASSISTANT
Payer: COMMERCIAL

## 2019-05-16 ENCOUNTER — OFFICE VISIT (OUTPATIENT)
Dept: SPINE | Facility: CLINIC | Age: 59
End: 2019-05-16
Payer: COMMERCIAL

## 2019-05-16 VITALS
HEART RATE: 87 BPM | HEIGHT: 66 IN | SYSTOLIC BLOOD PRESSURE: 126 MMHG | WEIGHT: 192.69 LBS | BODY MASS INDEX: 30.97 KG/M2 | DIASTOLIC BLOOD PRESSURE: 82 MMHG

## 2019-05-16 DIAGNOSIS — M54.12 CERVICAL RADICULITIS: Primary | ICD-10-CM

## 2019-05-16 DIAGNOSIS — Z12.11 COLON CANCER SCREENING: ICD-10-CM

## 2019-05-16 PROCEDURE — 3008F PR BODY MASS INDEX (BMI) DOCUMENTED: ICD-10-PCS | Mod: ,,, | Performed by: PHYSICAL MEDICINE & REHABILITATION

## 2019-05-16 PROCEDURE — 99213 PR OFFICE/OUTPT VISIT, EST, LEVL III, 20-29 MIN: ICD-10-PCS | Mod: ,,, | Performed by: PHYSICAL MEDICINE & REHABILITATION

## 2019-05-16 PROCEDURE — 99213 OFFICE O/P EST LOW 20 MIN: CPT | Mod: ,,, | Performed by: PHYSICAL MEDICINE & REHABILITATION

## 2019-05-16 PROCEDURE — 3008F BODY MASS INDEX DOCD: CPT | Mod: ,,, | Performed by: PHYSICAL MEDICINE & REHABILITATION

## 2019-05-16 NOTE — PROGRESS NOTES
SUBJECTIVE:    Patient ID: Bing Burch is a 58 y.o. female.    Chief Complaint: Follow-up (EMG)    She is here to review her EMG and nerve conduction studies which were done to evaluate her complaints of numbness and tingling in both hands.  The study is summarized below:    Conclusion:      Subtle changes in the contralateral side of her pain were noted.  The findings were indicative of a very mild Left C6 chronic radiculopathy    Clinically she is improving.  Less hand numbness and tingling.  Likewise the numbness and tingling in her feet seems to be getting better.  I note that she has tried gabapentin which made her feel groggy so she is not interested in getting on that.  She says she is not concerned enough about the numbness to warrant going to a neurologist        Past Medical History:   Diagnosis Date    Basal cell carcinoma 10/2018    left alar groove MOHS    GERD (gastroesophageal reflux disease)     Irritable bowel syndrome     mixed type, with slightly predominant constipation     Social History     Socioeconomic History    Marital status:      Spouse name: Not on file    Number of children: Not on file    Years of education: Not on file    Highest education level: Not on file   Occupational History    Not on file   Social Needs    Financial resource strain: Not hard at all    Food insecurity:     Worry: Never true     Inability: Never true    Transportation needs:     Medical: No     Non-medical: No   Tobacco Use    Smoking status: Former Smoker     Last attempt to quit: 1989     Years since quittin.6    Smokeless tobacco: Never Used   Substance and Sexual Activity    Alcohol use: Yes     Frequency: 2-3 times a week     Drinks per session: 1 or 2     Binge frequency: Never     Comment: occasionally    Drug use: No    Sexual activity: Not Currently   Lifestyle    Physical activity:     Days per week: 0 days     Minutes per session: Not on file    Stress: To  "some extent   Relationships    Social connections:     Talks on phone: More than three times a week     Gets together: Once a week     Attends Scientologist service: Not on file     Active member of club or organization: Yes     Attends meetings of clubs or organizations: 1 to 4 times per year     Relationship status:    Other Topics Concern    Are you pregnant or think you may be? Not Asked    Breast-feeding Not Asked   Social History Narrative    Not on file     Past Surgical History:   Procedure Laterality Date    CLOSURE, MOHS PROCEDURE DEFECT - left nose Left 2/4/2019    [facility] 2ND FLR    ESOPHAGOGASTRODUODENOSCOPY (EGD) N/A 8/18/2017    [facility]O    HERNIA REPAIR      hiatal hernia repair in 2013      Injection-steroid-epidural-cervical N/A 3/14/2019    Performed by [facility]aughn Drew [facility] at [facility] OR    SINUS SURGERY      TONSILLECTOMY      TUMOR EXCISION       Family History   Problem Relation Age of Onset    Psoriasis Brother     Breast cancer Mother 55    Eczema Neg Hx     Lupus Neg Hx     Melanoma Neg Hx      Vitals:    05/16/19 0839   BP: 126/82   Pulse: 87   Weight: 87.4 kg (192 lb 10.9 oz)   Height: 5' 6" (1.676 m)       Review of Systems   Constitutional: Negative for chills, diaphoresis, fatigue, fever and unexpected weight change.   HENT: Negative for trouble swallowing.    Eyes: Negative for visual disturbance.   Respiratory: Negative for shortness of breath.    Cardiovascular: Negative for chest pain.   Gastrointestinal: Negative for abdominal pain, constipation, nausea and vomiting.   Genitourinary: Negative for difficulty urinating.   Musculoskeletal: Negative for arthralgias, back pain, gait problem, joint swelling, myalgias, neck pain and neck stiffness.   Neurological: Negative for dizziness, speech difficulty, weakness, light-headedness, numbness and headaches.          Objective:      Physical Exam "   Constitutional: She appears well-developed and well-nourished.   Not examined           Assessment:       1. Cervical radiculitis           Plan:     she is better with regards to her posterior neck discomfort following the interlaminar epidural steroid injection on 03/14/2019.  Numbness and tingling in the hands and feet is improving and she is not interested in further intervention.  She can follow up on an as-needed basis      Cervical radiculitis

## 2019-05-17 LAB — HEMOCCULT STL QL IA: NEGATIVE

## 2019-05-19 ENCOUNTER — PATIENT MESSAGE (OUTPATIENT)
Dept: FAMILY MEDICINE | Facility: CLINIC | Age: 59
End: 2019-05-19

## 2019-05-19 DIAGNOSIS — K21.9 GASTROESOPHAGEAL REFLUX DISEASE WITHOUT ESOPHAGITIS: ICD-10-CM

## 2019-05-20 ENCOUNTER — TELEPHONE (OUTPATIENT)
Dept: FAMILY MEDICINE | Facility: CLINIC | Age: 59
End: 2019-05-20

## 2019-05-20 RX ORDER — ESOMEPRAZOLE MAGNESIUM 40 MG/1
40 CAPSULE, DELAYED RELEASE ORAL
Qty: 30 CAPSULE | Refills: 2 | Status: SHIPPED | OUTPATIENT
Start: 2019-05-20 | End: 2019-09-11 | Stop reason: SDUPTHER

## 2019-07-08 ENCOUNTER — OFFICE VISIT (OUTPATIENT)
Dept: SURGERY | Facility: CLINIC | Age: 59
End: 2019-07-08
Payer: COMMERCIAL

## 2019-07-08 VITALS
HEART RATE: 84 BPM | TEMPERATURE: 98 F | SYSTOLIC BLOOD PRESSURE: 122 MMHG | BODY MASS INDEX: 31.22 KG/M2 | DIASTOLIC BLOOD PRESSURE: 78 MMHG | HEIGHT: 66 IN | WEIGHT: 194.25 LBS

## 2019-07-08 DIAGNOSIS — N64.89 BREAST ASYMMETRY: ICD-10-CM

## 2019-07-08 DIAGNOSIS — R92.30 DENSE BREAST TISSUE ON MAMMOGRAM: ICD-10-CM

## 2019-07-08 DIAGNOSIS — Z91.89 AT HIGH RISK FOR BREAST CANCER: Primary | ICD-10-CM

## 2019-07-08 DIAGNOSIS — Z80.3 FAMILY HISTORY OF BREAST CANCER: ICD-10-CM

## 2019-07-08 DIAGNOSIS — Z12.39 BREAST CANCER SCREENING, HIGH RISK PATIENT: ICD-10-CM

## 2019-07-08 PROCEDURE — 99202 OFFICE O/P NEW SF 15 MIN: CPT | Mod: S$GLB,,, | Performed by: NURSE PRACTITIONER

## 2019-07-08 PROCEDURE — 3008F PR BODY MASS INDEX (BMI) DOCUMENTED: ICD-10-PCS | Mod: CPTII,S$GLB,, | Performed by: NURSE PRACTITIONER

## 2019-07-08 PROCEDURE — 99202 PR OFFICE/OUTPT VISIT, NEW, LEVL II, 15-29 MIN: ICD-10-PCS | Mod: S$GLB,,, | Performed by: NURSE PRACTITIONER

## 2019-07-08 PROCEDURE — 99999 PR PBB SHADOW E&M-EST. PATIENT-LVL III: CPT | Mod: PBBFAC,,, | Performed by: NURSE PRACTITIONER

## 2019-07-08 PROCEDURE — 99999 PR PBB SHADOW E&M-EST. PATIENT-LVL III: ICD-10-PCS | Mod: PBBFAC,,, | Performed by: NURSE PRACTITIONER

## 2019-07-08 PROCEDURE — 3008F BODY MASS INDEX DOCD: CPT | Mod: CPTII,S$GLB,, | Performed by: NURSE PRACTITIONER

## 2019-07-08 NOTE — LETTER
July 8, 2019      Parul Yap NP  [address] [place] [address] Drive  [id number]  [facility] [id number]           [facility]  [address]  [facility] [address]  Phone: [phone]  Fax: [phone]          Patient: Bing Burch   MR Number: [id number]   YOB: 1960   Date of Visit: 7/8/2019       Dear Parul Yap:    Thank you for referring Bing Burch to me for evaluation. Attached you will find relevant portions of my assessment and plan of care.    If you have questions, please do not hesitate to call me. I look forward to following Bing Burch along with you.    Sincerely,    Az Keene, DNP    Enclosure  CC:  No Recipients    If you would like to receive this communication electronically, please contact [email or web address] or [phone] to request more information on [employer] Link access.    For providers and/or their staff who would like to refer a patient to [facility], please contact us through our one-stop-shop provider referral line, [facility], at [phone].    If you feel you have received this communication in error or would no longer like to receive these types of communications, please e-mail [email or web address]

## 2019-07-08 NOTE — PROGRESS NOTES
"Patient ID: Bing Burch is a 58 y.o. female.    Chief Complaint: Breast Cancer Screening        HPI:  New patient to the breast center presents today for possible increased risk of breast cancer.    Patient denies palpable breast mass, breast pain, breast-related skin changes, nipple discharge and nipple retraction.  No other breast-related concerns.     Breast/Oncologic History:    Personal history of breast cancer:  no  Personal history of breast biopsy:  no  Personal history of breast surgery:  no    History of basal cell skin carcinoma diagnosed in 10/2018, with surgery and reconstruction in 2019, no chemo or XRT    Breast Imaging    3/4/19 bilat screening mmg report reviewed:  heterogeneously dense breast tissue  BI-RADS 1  TC-7 lifetime risk 22.14%    GYN History:  Age of menarche:  12-12 y/o  , first live birth at 26 y/o  Uterus and ovaries:  intact  Menopause:  Early 50s (natural)  HRT usage:  stopped use 5 or more years ago, unknown if estrogen-only or combined, used for 1.5 years approximately    Other Oncologic History:  Personal history of other cancer not abovementioned:  yes, basal cell skin CA (see above); no others  Personal history of genetic testing:  no    Social History:  Tobacco use:  former smoker  Alcohol use:  1-2 drink(s) per week    Family Oncologic History:    Ashkenazi Pentecostalism ancestry:  no  History of genetic testing in relatives:  no    Family History   Problem Relation Age of Onset    Breast cancer Mother 55        unilateral, "estrogen-fed"    Breast cancer Maternal Aunt         great-aunt in 90s    Ovarian cancer Neg Hx          Patient's Breast Cancer Risk Assessment Scores:  Taking into account the above information, the patient's breast cancer risk assessment scores were calculated today as follows:  Irene lifetime risk:  30.4%        Review of Systems - See HPI.  Objective:   Physical Exam   Pulmonary/Chest: Effort normal. No respiratory distress. She " exhibits no mass, no edema and no deformity. Right breast exhibits no inverted nipple, no mass, no nipple discharge, no skin change and no tenderness. Left breast exhibits tenderness. Left breast exhibits no inverted nipple, no nipple discharge and no skin change. No breast swelling. Breasts are asymmetrical.   L breast 1oc region about 7cmfn with ~1.5-inch area of nodularity, asymmetrical as compared to corresponding contralateral region; flat, mobile, TTP.  No discrete mass appreciated.   Genitourinary: No breast swelling.   Lymphadenopathy:     She has no cervical adenopathy.     She has no axillary adenopathy.        Right: No supraclavicular adenopathy present.        Left: No supraclavicular adenopathy present.   She has no right or left infraclavicular adenopathy.  Assessment:       1. At high risk for breast cancer    2. Breast cancer screening, high risk patient    3. Dense breast tissue on mammogram    4. Breast asymmetry    5. Family history of breast cancer          Plan:     HIGH-RISK COUNSELING    Counseled patient regarding her being at increased risk for breast cancer based on risk factors which patient and I discussed today and which were factored into the Tyrer-Cuzick risk assessment model, which estimated a lifetime risk of breast cancer of 30.4% for this patient as of today.  Discussed with patient that there are several models available for stratifying breast cancer risk, and Tyrer-Cuzick is presently the model utilized by [facility] and is a model recommended per current NCCN guidelines.  Counseled patient regarding modifiable risk factors for breast cancer as recommended by NCCN, including exercising, maintaining a healthy BMI, limiting alcohol consumption to less than one drink per day, and refraining from smoking.  Discussed with patient current NCCN guideline recommendations for increased breast cancer screening in individuals with a lifetime risk of breast cancer >20%, to  include semiannual CBE, along with annual mammogram and annual breast MRI, which would alternate.  Risks, including gadolinium retention and risk for false-positives, and benefits of increased screening with breast MRI were discussed.  Discussed with patient option for genetic counseling through [facility].  Discussed with patient option of speaking with Medical Oncology regarding taking a pharmacologic agent such as tamoxifen or an AI to reduce breast cancer risk.     PLAN    L breast limited US now for L breast 1oc region nodularity.    After a thorough discussion, patient elects to proceed with alternating annual mammogram and annual breast MRI along with semiannual CBEs.    Order placed for bilateral breast MRI, to occur around 9/4/19 along with CBE.  Advised patient to contact the pricing transparency line (phone number provided), prior to undergoing breast MRI, to determine her out-of-pocket cost.  Advised patient to RTC around 9/4/19 for CBE regardless of whether she undergoes breast MRI, for follow-up and so that she can then be on Q6-month CBE schedule.    Next bilat mmg due around 3/5/20.    Patient declines a referral to [facility] for genetic counseling and an appointment in Medical Oncology to discuss the possibility of chemoprevention at this time and was advised to contact clinic with any changes in her decision.    Offered pt referral to Nutrition secondary to her increased risk of breast cancer along with her elevated BMI.  Pt declined.    Encouraged breast awareness, including monthly breast self-exams.  Advised patient to RTC with any interval changes or concerns.  Questions were encouraged and answered to patient's satisfaction, and patient verbalized understanding of information and agreement with the plan.

## 2019-08-05 ENCOUNTER — HOSPITAL ENCOUNTER (OUTPATIENT)
Dept: RADIOLOGY | Facility: HOSPITAL | Age: 59
Discharge: HOME | End: 2019-08-05
Attending: NURSE PRACTITIONER
Payer: COMMERCIAL

## 2019-08-05 DIAGNOSIS — N64.89 BREAST ASYMMETRY: ICD-10-CM

## 2019-08-05 PROCEDURE — 76642 US BREAST LEFT LIMITED: ICD-10-PCS | Mod: 26,LT,, | Performed by: RADIOLOGY

## 2019-08-05 PROCEDURE — 76642 ULTRASOUND BREAST LIMITED: CPT | Mod: TC,PO,LT

## 2019-08-05 PROCEDURE — 76642 ULTRASOUND BREAST LIMITED: CPT | Mod: 26,LT,, | Performed by: RADIOLOGY

## 2019-09-04 ENCOUNTER — OFFICE VISIT (OUTPATIENT)
Dept: FAMILY MEDICINE | Facility: CLINIC | Age: 59
End: 2019-09-04
Payer: COMMERCIAL

## 2019-09-04 VITALS
BODY MASS INDEX: 30.9 KG/M2 | DIASTOLIC BLOOD PRESSURE: 76 MMHG | OXYGEN SATURATION: 97 % | HEIGHT: 66 IN | HEART RATE: 86 BPM | SYSTOLIC BLOOD PRESSURE: 126 MMHG | TEMPERATURE: 98 F | WEIGHT: 192.25 LBS

## 2019-09-04 DIAGNOSIS — B96.89 ACUTE BACTERIAL SINUSITIS: Primary | ICD-10-CM

## 2019-09-04 DIAGNOSIS — J01.90 ACUTE BACTERIAL SINUSITIS: Primary | ICD-10-CM

## 2019-09-04 PROCEDURE — 99214 OFFICE O/P EST MOD 30 MIN: CPT | Mod: 25,S$GLB,, | Performed by: FAMILY MEDICINE

## 2019-09-04 PROCEDURE — 3008F PR BODY MASS INDEX (BMI) DOCUMENTED: ICD-10-PCS | Mod: CPTII,S$GLB,, | Performed by: FAMILY MEDICINE

## 2019-09-04 PROCEDURE — 3008F BODY MASS INDEX DOCD: CPT | Mod: CPTII,S$GLB,, | Performed by: FAMILY MEDICINE

## 2019-09-04 PROCEDURE — 99999 PR PBB SHADOW E&M-EST. PATIENT-LVL III: ICD-10-PCS | Mod: PBBFAC,,, | Performed by: FAMILY MEDICINE

## 2019-09-04 PROCEDURE — 99999 PR PBB SHADOW E&M-EST. PATIENT-LVL III: CPT | Mod: PBBFAC,,, | Performed by: FAMILY MEDICINE

## 2019-09-04 PROCEDURE — 96372 PR INJECTION,THERAP/PROPH/DIAG2ST, IM OR SUBCUT: ICD-10-PCS | Mod: S$GLB,,, | Performed by: FAMILY MEDICINE

## 2019-09-04 PROCEDURE — 96372 THER/PROPH/DIAG INJ SC/IM: CPT | Mod: S$GLB,,, | Performed by: FAMILY MEDICINE

## 2019-09-04 PROCEDURE — 99214 PR OFFICE/OUTPT VISIT, EST, LEVL IV, 30-39 MIN: ICD-10-PCS | Mod: 25,S$GLB,, | Performed by: FAMILY MEDICINE

## 2019-09-04 RX ORDER — BETAMETHASONE SODIUM PHOSPHATE AND BETAMETHASONE ACETATE 3; 3 MG/ML; MG/ML
9 INJECTION, SUSPENSION INTRA-ARTICULAR; INTRALESIONAL; INTRAMUSCULAR; SOFT TISSUE
Status: COMPLETED | OUTPATIENT
Start: 2019-09-04 | End: 2019-09-04

## 2019-09-04 RX ORDER — AMOXICILLIN AND CLAVULANATE POTASSIUM 875; 125 MG/1; MG/1
1 TABLET, FILM COATED ORAL 2 TIMES DAILY
Qty: 20 TABLET | Refills: 0 | Status: SHIPPED | OUTPATIENT
Start: 2019-09-04 | End: 2019-09-14

## 2019-09-04 RX ADMIN — BETAMETHASONE SODIUM PHOSPHATE AND BETAMETHASONE ACETATE 9 MG: 3; 3 INJECTION, SUSPENSION INTRA-ARTICULAR; INTRALESIONAL; INTRAMUSCULAR; SOFT TISSUE at 11:09

## 2019-09-04 NOTE — PROGRESS NOTES
Subjective:       Patient ID: Bing Burch is a 58 y.o. female.    Chief Complaint: URI    New to me patient here for UC visit.    Sinusitis   This is a new problem. The current episode started in the past 7 days. The problem has been gradually worsening since onset. Maximum temperature: Subjective. The fever has been present for 1 to 2 days. The pain is moderate. Associated symptoms include congestion, coughing and sinus pressure. Pertinent negatives include no shortness of breath. (Facial pain; thick, purulent PND) Treatments tried: Day/Nyquil. The treatment provided mild relief.     Review of Systems   Constitutional: Negative for fever.   HENT: Positive for congestion and sinus pressure.    Respiratory: Positive for cough. Negative for shortness of breath.    Cardiovascular: Negative for chest pain.   Gastrointestinal: Negative for abdominal pain and nausea.   Skin: Negative for rash.   All other systems reviewed and are negative.      Objective:      Physical Exam   Constitutional: She appears well-developed. No distress.   HENT:   Right Ear: Tympanic membrane normal. Tympanic membrane is not erythematous.   Left Ear: Tympanic membrane normal. Tympanic membrane is not erythematous.   Nose: Mucosal edema present. Right sinus exhibits maxillary sinus tenderness. Left sinus exhibits maxillary sinus tenderness.   Mouth/Throat: Posterior oropharyngeal erythema present.   Neck: Neck supple.   Cardiovascular: Normal rate and regular rhythm.   No murmur heard.  Pulmonary/Chest: Effort normal and breath sounds normal.   Lymphadenopathy:     She has no cervical adenopathy.   Skin: Skin is warm and dry.       Assessment:       1. Acute bacterial sinusitis        Plan:       Acute bacterial sinusitis  -     betamethasone acetate-betamethasone sodium phosphate injection 9 mg  -     amoxicillin-clavulanate 875-125mg (AUGMENTIN) 875-125 mg per tablet; Take 1 tablet by mouth 2 (two) times daily. Take after meals. for  10 days  Dispense: 20 tablet; Refill: 0      Push fluids intake.

## 2019-09-09 ENCOUNTER — CLINICAL SUPPORT (OUTPATIENT)
Dept: URGENT CARE | Facility: CLINIC | Age: 59
End: 2019-09-09
Payer: COMMERCIAL

## 2019-09-09 ENCOUNTER — DOCUMENTATION ONLY (OUTPATIENT)
Dept: FAMILY MEDICINE | Facility: CLINIC | Age: 59
End: 2019-09-09

## 2019-09-09 VITALS
TEMPERATURE: 97 F | DIASTOLIC BLOOD PRESSURE: 74 MMHG | BODY MASS INDEX: 30.53 KG/M2 | SYSTOLIC BLOOD PRESSURE: 130 MMHG | HEART RATE: 70 BPM | RESPIRATION RATE: 16 BRPM | HEIGHT: 66 IN | WEIGHT: 190 LBS | OXYGEN SATURATION: 99 %

## 2019-09-09 DIAGNOSIS — T63.484A LOCAL REACTION TO INSECT STING, UNDETERMINED INTENT, INITIAL ENCOUNTER: Primary | ICD-10-CM

## 2019-09-09 PROCEDURE — 99214 OFFICE O/P EST MOD 30 MIN: CPT | Mod: 25,S$GLB,, | Performed by: NURSE PRACTITIONER

## 2019-09-09 PROCEDURE — 96372 THER/PROPH/DIAG INJ SC/IM: CPT | Mod: S$GLB,,, | Performed by: NURSE PRACTITIONER

## 2019-09-09 PROCEDURE — 96372 PR INJECTION,THERAP/PROPH/DIAG2ST, IM OR SUBCUT: ICD-10-PCS | Mod: S$GLB,,, | Performed by: NURSE PRACTITIONER

## 2019-09-09 PROCEDURE — 99214 PR OFFICE/OUTPT VISIT, EST, LEVL IV, 30-39 MIN: ICD-10-PCS | Mod: 25,S$GLB,, | Performed by: NURSE PRACTITIONER

## 2019-09-09 RX ORDER — CETIRIZINE HYDROCHLORIDE 10 MG/1
10 TABLET ORAL DAILY
Qty: 30 TABLET | Refills: 2 | Status: SHIPPED | OUTPATIENT
Start: 2019-09-09 | End: 2019-11-18

## 2019-09-09 RX ORDER — PREDNISONE 20 MG/1
20 TABLET ORAL 2 TIMES DAILY
Qty: 10 TABLET | Refills: 0 | Status: SHIPPED | OUTPATIENT
Start: 2019-09-09 | End: 2019-09-14

## 2019-09-09 RX ORDER — DEXAMETHASONE SODIUM PHOSPHATE 100 MG/10ML
10 INJECTION INTRAMUSCULAR; INTRAVENOUS
Status: COMPLETED | OUTPATIENT
Start: 2019-09-09 | End: 2019-09-09

## 2019-09-09 RX ADMIN — DEXAMETHASONE SODIUM PHOSPHATE 10 MG: 100 INJECTION INTRAMUSCULAR; INTRAVENOUS at 10:09

## 2019-09-09 NOTE — PROGRESS NOTES
Patient ID: Bing Burch is a 58 y.o. female.    Chief Complaint: Follow-up (F/U CBE Breast MRI @ 10:45 am .)        HPI:  Established patient presents today for breast cancer screening, high-risk patient.    Breast/Oncologic History:     Personal history of breast cancer:  no  Personal history of breast biopsy:  no  Personal history of breast surgery:  no     History of basal cell skin carcinoma diagnosed in 10/2018, with surgery and reconstruction in 2019, no chemo or XRT     Breast Imaging     3/4/19 bilat screening mmg report reviewed:  heterogeneously dense breast tissue  BI-RADS 1  TC-7 lifetime risk 22.14%    19 L breast ltd US report reviewed:  Findings:  There is no evidence of suspicious mass or focal shadowing in the 1 o'clock axis of the left breast, at area of clinical concern.   Impression:  No sonographic evidence of malignancy. Of note, the decision to biopsy any palpable finding should be based on clinical assessment.    BI-RADS Category 1: Negative Finding   Recommendation:  Return to annual screening mammogram schedule is recommended due in .   The patient's estimated lifetime risk of breast cancer (to age 85) based on Tyrer-Cuzick - 7 risk assessment model is: Tyrer-Cuzick: 25.81 %.     Interval Breast History    Patient denies palpable breast mass, breast pain, breast-related skin changes, nipple discharge and nipple retraction.  No other breast-related concerns.      GYN History:  Age of menarche:  12-14 y/o  , first live birth at 28 y/o  Uterus and ovaries:  intact  Menopause:  Early 50s (natural)  HRT usage:  stopped use 5 or more years ago, unknown if estrogen-only or combined, used for 1.5 years approximately     Other Oncologic History:  Personal history of other cancer not abovementioned:  yes, basal cell skin CA (see above); no others  Personal history of genetic testing:  no     Social History:  Tobacco use:  former smoker     Family Oncologic  "History:     Ashkenazi Anabaptism ancestry:  no  History of genetic testing in relatives:  no    Family History   Problem Relation Age of Onset    Breast cancer Mother 55        unilateral, "estrogen-fed"    Breast cancer Maternal Aunt         great-aunt in 90s    Ovarian cancer Neg Hx         Patient's Breast Cancer Risk Assessment Scores:  Taking into account the above information, the patient's breast cancer risk assessment score was calculated on 7/8/19 as follows:  Irene lifetime risk:  30.4%       Review of Systems - See HPI.  Objective:   Physical Exam   Pulmonary/Chest: Effort normal. No respiratory distress. She exhibits no mass, no edema and no deformity. Right breast exhibits no inverted nipple, no mass, no nipple discharge, no skin change and no tenderness. Left breast exhibits tenderness. Left breast exhibits no inverted nipple, no mass, no nipple discharge and no skin change. No breast swelling. Breasts are symmetrical.   L breast 1oc region about 7cmfn with ~1.5-inch area of more pronounced dense breast tissue with no detrimental change appreciated as compared to last CBE; not suggestive of pathology.  Left areolar region with mild TTP with no associated mass appreciated; this is not suggestive of pathology.  Unable to elicit discharge from either nipple.   Genitourinary: No breast swelling.   Lymphadenopathy:     She has no cervical adenopathy.     She has no axillary adenopathy.        Right: No supraclavicular adenopathy present.        Left: No supraclavicular adenopathy present.      She has no right or left infraclavicular adenopathy.   Assessment:       1. Breast cancer screening, high risk patient    2. Family history of breast cancer          Counseling / Medical Decision-Making:        Clinically MANDO as of clinic visit.    Bilateral breast MRI is scheduled for today after clinic visit.     Next bilat mmg due around 3/5/20.    Encouraged breast awareness, including monthly breast " self-exams.  Advised patient to RTC with any interval changes or concerns.  Questions were encouraged and answered to patient's satisfaction, and patient verbalized understanding of information and agreement with the plan.     Plan:     1.  Breast MRI today  2.  RTC around 3/5/20 for bilat MMG and CBE; MMG order to be placed at future visit

## 2019-09-09 NOTE — PROGRESS NOTES
Pre-Visit Chart Review  For Appointment Scheduled on 9/11/19    Health Maintenance Due   Topic Date Due    Pneumococcal Vaccine (Highest Risk) (1 of 3 - PCV13) 11/30/1978    Colonoscopy  08/14/2016

## 2019-09-09 NOTE — PROGRESS NOTES
"Subjective:       Patient ID: Bing Burch is a 58 y.o. female.    Vitals:  height is 5' 6" (1.676 m) and weight is 86.2 kg (190 lb). Her oral temperature is 97.4 °F (36.3 °C). Her blood pressure is 130/74 and her pulse is 70. Her respiration is 16 and oxygen saturation is 99%.     Chief Complaint: Insect Bite    Possible insect bite to Lt forearm X 9/8/19, C/O pain/redness/itching/warm to touch; pt is currently on augmentin 875 mg for sinus infection    Insect Bite   This is a new problem. The current episode started yesterday. Associated symptoms include a rash. Pertinent negatives include no arthralgias, chills, coughing, fever, joint swelling or sore throat. Treatments tried: cortizone cream.       Constitution: Negative for chills and fever.   HENT: Negative for facial swelling and sore throat.    Neck: Negative for painful lymph nodes.   Eyes: Negative for eye itching and eyelid swelling.   Respiratory: Negative for cough.    Musculoskeletal: Negative for joint pain and joint swelling.   Skin: Positive for rash and erythema. Negative for color change, pale, wound, abrasion, laceration, lesion, skin thickening/induration, puncture wound, bruising, abscess, avulsion and hives.   Allergic/Immunologic: Negative for environmental allergies, immunocompromised state and hives.   Hematologic/Lymphatic: Negative for swollen lymph nodes.       Objective:      Physical Exam   Constitutional: She is oriented to person, place, and time. She appears well-developed and well-nourished.   HENT:   Head: Normocephalic and atraumatic. Head is without abrasion, without contusion and without laceration.   Right Ear: External ear normal.   Left Ear: External ear normal.   Nose: Nose normal.   Mouth/Throat: Oropharynx is clear and moist.   Eyes: Pupils are equal, round, and reactive to light. Conjunctivae, EOM and lids are normal.   Neck: Trachea normal, full passive range of motion without pain and phonation normal. Neck " supple.   Cardiovascular: Normal rate, regular rhythm and normal heart sounds.   Pulmonary/Chest: Effort normal and breath sounds normal. No stridor. No respiratory distress.   Musculoskeletal: Normal range of motion.   Neurological: She is alert and oriented to person, place, and time.   Skin: Skin is warm, dry and intact. Capillary refill takes less than 2 seconds. No abrasion, no bruising, no burn, no ecchymosis, no laceration, no lesion and no rash noted. There is erythema.        Single erythematous raised whelp to left anterior forearm, mildly warm to touch and tender. No fluctuance   Psychiatric: She has a normal mood and affect. Her speech is normal and behavior is normal. Judgment and thought content normal. Cognition and memory are normal.   Nursing note and vitals reviewed.      Assessment:       1. Local reaction to insect sting, undetermined intent, initial encounter        Plan:         Local reaction to insect sting, undetermined intent, initial encounter    Other orders  -     dexamethasone injection 10 mg  -     predniSONE (DELTASONE) 20 MG tablet; Take 1 tablet (20 mg total) by mouth 2 (two) times daily. for 5 days  Dispense: 10 tablet; Refill: 0  -     cetirizine (ZYRTEC) 10 MG tablet; Take 1 tablet (10 mg total) by mouth once daily.  Dispense: 30 tablet; Refill: 2

## 2019-09-09 NOTE — PATIENT INSTRUCTIONS
Insect Sting Allergy, Generalized  You are having an allergic reaction to an insect sting. This may occur after a sting by a wasp, honeybee, yellow jacket, or other insect. This may cause an itchy rash and swelling in the face or other parts of the body. A more severe reaction may cause you to feel dizzy, faint, or have trouble breathing or swallowing. Other warning signs are listed below.  Symptoms can include:  · Rash, hives, redness, welts, or blisters in areas other than the sting site  · Itching, burning, stinging, pain in areas other than the sting site  · Dry, flaky, cracking, scaly skin  · Swelling in areas other than the sting site   · Stomach pain or cramps  More severe symptoms include:  · Swelling of the face or lips or drooling  · Trouble swallowing, feeling like your throat is closing  · Trouble breathing, wheezing  · Dizziness or a sudden decrease in blood pressure  · Hoarse voice or trouble speaking  · Severe nausea, vomiting, or diarrhea  · Feeling faint or lightheaded  · Rapid heart rate  Home care  Medicine  The healthcare provider may prescribe medicines to relieve swelling, itching, and pain. Follow the providers instructions when taking these medicines.  · If you had a severe reaction, the provider may prescribe an injectable epinephrine kit. Epinephrine will stop the progression of an allergic reaction. Before you leave the hospital, be sure that you understand when and how to use this medicine.  · Oral diphenhydramine is an over-the-counter antihistamine available at pharmacies and grocery stores. Unless a prescription antihistamine was given, diphenhydramine may be used to reduce itching if large areas of the skin are involved. It may make you sleepy, so be careful using it in the daytime or when going to school, working, or driving. Note: Dont use diphenhydramine if you have glaucoma or if you are a man with trouble urinating due to an enlarged prostate. There are other antihistamines  that cause less drowsiness and are good choices for daytime use. Ask your pharmacist for suggestions.  · Dont use diphenhydramine cream on your skin. It can cause a further reaction in some people.  · Calamine lotion or oatmeal baths sometimes help with itching.  · You may use acetaminophen or ibuprofen to control pain, unless another pain medicine was prescribed. Note: If you have chronic liver or kidney disease or ever had a stomach ulcer or gastrointestinal bleeding, talk with your provider before using these medicines.    General care  Avoid tight clothing and things that heat up your skin (such as hot showers or baths, or direct sunlight). Heat makes the itching worse.  An ice pack will relieve local areas of intense itching and redness. Apply 5 to 10 minutes. To make an ice pack, put ice cubes in a plastic bag that seals at the top. Wrap the bag in a clean, thin towel or cloth. Dont put ice directly on the skin.  Ticks  If you try to remove a tick, do the following:  · Use a set of fine tweezers and  the tick as close to the skin as is possible.  · Pull upwards, using even, steady pressure. Dont jerk or twist the tick. The ticks bodily fluids may contain infection-causing organisms. So dont squeeze, crush, or puncture the body of the tick. Dont use a smoldering match or cigarette, nail polish, petroleum jelly, liquid soap, or kerosene. They may irritate the tick.  · If any mouthparts of the tick remain in the skin, these can be removed with tweezers. If you cant remove the mouth (of a tick) easily with clean tweezers, leave it alone and let the skin heal.  · After the tick is removed, wash the bite area with rubbing alcohol, iodine, or soap and water.  · Put the tick in a sealed container and completely cover it with alcohol. Never try to kill or crush a tick with your hand or fingers.  Stings  Wasps, yellow jackets, and hornets dont leave a stinger behind. But if a honeybee stings you, a stinger  may stay in your skin. The stinger of a honeybee releases a substance that will attract other bees to you. So try to move away from the nest immediately. Once you are away from the nest, then remove the stinger as quickly as possible by:  · Scraping the stinger out with the edge of a dull knife or plastic card (credit card).  · Don't use a tweezer or your fingers to remove the stinger since that may squeeze more toxin from the stinger.  · Wash the affected area with soap and warm water 2 to 3 times a day. Don't break a blister, if present.  · Next apply an ice pack for 5 to 10 minutes. To make an ice pack, put ice cubes in a plastic bag that seals at the top. Wrap the bag in a clean, thin towel or cloth. Dont put ice directly on the skin.  · Contact your healthcare provider and ask what can be used to help decrease the swelling and itching to the affected area.   · To prevent an infection, don't scratch the affected areas. Always check the sting area for signs of an infection: increased redness, swelling, or pain to the affected area.  Preventing future reactions  Future reactions could be worse than this one. So try to avoid situations where you might be stung:  · Don't walk in grass without shoes. Avoid wearing sandals.  · Don't leave food uncovered when eating outside. Sweet treats, watermelon, and ice cream attract insects.  · Don't drink from uncovered sweetened drinks in cans when outside. Insects are attracted to soda drink cans and sometimes crawl inside of them.  · Don't wear bright colored clothes with flowery prints and patterns when outside.  · Dont wear perfume when outside. Smell attracts insects.  · Wear long pants, long-sleeved shirts, socks, and work gloves when working outside.  · Be aware that honeybees nest in trees. Wasps and yellow jackets nest in the ground, trees or roof eaves. Avoid garbage cans when outside.  Auto-injectable epinephrine  · If you are at high risk for another sting due to  where you work or play, or if your reaction included dizziness, fainting or trouble breathing or swallowing, an auto-injectable epinephrine may be prescribed. If not, ask your healthcare provider for one and always carry it with you. Learn how to use the device. If you begin to feel the symptoms of another reaction in the future, use the auto-injectable epinephrine to inject yourself, and then call 911. Don't wait until symptoms become severe.   · Remember that the auto-injectable epinephrine is a rescue medicine only. You still need someone to take you to the hospital or call 911 after you have received the medicine.  Follow-up care  Follow up with your healthcare provider, or as advised if your symptoms do not continue to improve.  Call 911  Call 911 if any of these occur:  · Trouble breathing or swallowing, wheezing   · Cool, moist, pale skin  · Hoarse voice or trouble speaking  · Confused  · Very drowsy or trouble waking up  · Fainting or loss of consciousness  · Rapid heart rate  · Low blood pressure or feeling dizzy or weak  · Feeling of doom  · Severe nausea, vomiting, or diarrhea  · Seizure  · Swelling in the face, eyelids, lips, mouth, throat or tongue  · Drooling  When to seek medical advice  Call your healthcare provider right away if any of the following occur:  · Spreading areas of itching, redness or swelling  · Headache, fever, chills, muscle or joint aching  · Increased pain or swelling  · Signs of infection of the affected area:  ¨ Spreading redness  ¨ Increase in pain or swelling  ¨ Fluid or colored drainage from the affected site  Date Last Reviewed: 3/1/2017  © 1650-2453 [employer]. [address] [place] [address]. All rights reserved. This information is not intended as a substitute for professional medical care. Always follow your healthcare professional's instructions.

## 2019-09-10 ENCOUNTER — HOSPITAL ENCOUNTER (OUTPATIENT)
Dept: RADIOLOGY | Facility: HOSPITAL | Age: 59
Discharge: HOME | End: 2019-09-10
Attending: NURSE PRACTITIONER
Payer: COMMERCIAL

## 2019-09-10 ENCOUNTER — OFFICE VISIT (OUTPATIENT)
Dept: SURGERY | Facility: CLINIC | Age: 59
End: 2019-09-10
Payer: COMMERCIAL

## 2019-09-10 VITALS
HEART RATE: 71 BPM | WEIGHT: 186 LBS | BODY MASS INDEX: 29.89 KG/M2 | TEMPERATURE: 98 F | HEIGHT: 66 IN | SYSTOLIC BLOOD PRESSURE: 132 MMHG | DIASTOLIC BLOOD PRESSURE: 77 MMHG

## 2019-09-10 DIAGNOSIS — R92.30 DENSE BREAST TISSUE ON MAMMOGRAM: ICD-10-CM

## 2019-09-10 DIAGNOSIS — Z12.39 BREAST CANCER SCREENING, HIGH RISK PATIENT: Primary | ICD-10-CM

## 2019-09-10 DIAGNOSIS — Z80.3 FAMILY HISTORY OF BREAST CANCER: ICD-10-CM

## 2019-09-10 DIAGNOSIS — Z91.89 AT HIGH RISK FOR BREAST CANCER: ICD-10-CM

## 2019-09-10 PROCEDURE — 99999 PR PBB SHADOW E&M-EST. PATIENT-LVL III: ICD-10-PCS | Mod: PBBFAC,,, | Performed by: NURSE PRACTITIONER

## 2019-09-10 PROCEDURE — 77049 MRI BREAST C-+ W/CAD BI: CPT | Mod: 26,,, | Performed by: RADIOLOGY

## 2019-09-10 PROCEDURE — 77049 MRI BREAST C-+ W/CAD BI: CPT | Mod: TC

## 2019-09-10 PROCEDURE — 99999 PR PBB SHADOW E&M-EST. PATIENT-LVL III: CPT | Mod: PBBFAC,,, | Performed by: NURSE PRACTITIONER

## 2019-09-10 PROCEDURE — 3008F BODY MASS INDEX DOCD: CPT | Mod: CPTII,S$GLB,, | Performed by: NURSE PRACTITIONER

## 2019-09-10 PROCEDURE — 77049 MRI BREAST W/WO CONTRAST, W/CAD, BILATERAL: ICD-10-PCS | Mod: 26,,, | Performed by: RADIOLOGY

## 2019-09-10 PROCEDURE — 25500020 PHARM REV CODE 255: Performed by: NURSE PRACTITIONER

## 2019-09-10 PROCEDURE — 99213 OFFICE O/P EST LOW 20 MIN: CPT | Mod: S$GLB,,, | Performed by: NURSE PRACTITIONER

## 2019-09-10 PROCEDURE — A9577 INJ MULTIHANCE: HCPCS | Performed by: NURSE PRACTITIONER

## 2019-09-10 PROCEDURE — 3008F PR BODY MASS INDEX (BMI) DOCUMENTED: ICD-10-PCS | Mod: CPTII,S$GLB,, | Performed by: NURSE PRACTITIONER

## 2019-09-10 PROCEDURE — 99213 PR OFFICE/OUTPT VISIT, EST, LEVL III, 20-29 MIN: ICD-10-PCS | Mod: S$GLB,,, | Performed by: NURSE PRACTITIONER

## 2019-09-10 RX ADMIN — GADOBENATE DIMEGLUMINE 18 ML: 529 INJECTION, SOLUTION INTRAVENOUS at 11:09

## 2019-09-11 ENCOUNTER — OFFICE VISIT (OUTPATIENT)
Dept: FAMILY MEDICINE | Facility: CLINIC | Age: 59
End: 2019-09-11
Payer: COMMERCIAL

## 2019-09-11 ENCOUNTER — TELEPHONE (OUTPATIENT)
Dept: RADIOLOGY | Facility: HOSPITAL | Age: 59
End: 2019-09-11

## 2019-09-11 VITALS
BODY MASS INDEX: 30.72 KG/M2 | TEMPERATURE: 98 F | HEIGHT: 66 IN | SYSTOLIC BLOOD PRESSURE: 112 MMHG | HEART RATE: 65 BPM | WEIGHT: 191.13 LBS | DIASTOLIC BLOOD PRESSURE: 82 MMHG | OXYGEN SATURATION: 94 %

## 2019-09-11 DIAGNOSIS — K21.9 GASTROESOPHAGEAL REFLUX DISEASE WITHOUT ESOPHAGITIS: ICD-10-CM

## 2019-09-11 DIAGNOSIS — K21.9 GASTROESOPHAGEAL REFLUX DISEASE, ESOPHAGITIS PRESENCE NOT SPECIFIED: ICD-10-CM

## 2019-09-11 DIAGNOSIS — R79.89 LOW SERUM VITAMIN D: Primary | ICD-10-CM

## 2019-09-11 PROCEDURE — 99396 PREV VISIT EST AGE 40-64: CPT | Mod: 25,S$GLB,, | Performed by: FAMILY MEDICINE

## 2019-09-11 PROCEDURE — 99999 PR PBB SHADOW E&M-EST. PATIENT-LVL IV: ICD-10-PCS | Mod: PBBFAC,,, | Performed by: FAMILY MEDICINE

## 2019-09-11 PROCEDURE — 90686 FLU VACCINE (QUAD) GREATER THAN OR EQUAL TO 3YO PRESERVATIVE FREE IM: ICD-10-PCS | Mod: S$GLB,,, | Performed by: FAMILY MEDICINE

## 2019-09-11 PROCEDURE — 90686 IIV4 VACC NO PRSV 0.5 ML IM: CPT | Mod: S$GLB,,, | Performed by: FAMILY MEDICINE

## 2019-09-11 PROCEDURE — 99396 PR PREVENTIVE VISIT,EST,40-64: ICD-10-PCS | Mod: 25,S$GLB,, | Performed by: FAMILY MEDICINE

## 2019-09-11 PROCEDURE — 90471 IMMUNIZATION ADMIN: CPT | Mod: S$GLB,,, | Performed by: FAMILY MEDICINE

## 2019-09-11 PROCEDURE — 90471 FLU VACCINE (QUAD) GREATER THAN OR EQUAL TO 3YO PRESERVATIVE FREE IM: ICD-10-PCS | Mod: S$GLB,,, | Performed by: FAMILY MEDICINE

## 2019-09-11 PROCEDURE — 99999 PR PBB SHADOW E&M-EST. PATIENT-LVL IV: CPT | Mod: PBBFAC,,, | Performed by: FAMILY MEDICINE

## 2019-09-11 RX ORDER — ESOMEPRAZOLE MAGNESIUM 40 MG/1
40 CAPSULE, DELAYED RELEASE ORAL
Qty: 90 CAPSULE | Refills: 3 | OUTPATIENT
Start: 2019-09-11 | End: 2019-09-11 | Stop reason: SDUPTHER

## 2019-09-11 RX ORDER — ESOMEPRAZOLE MAGNESIUM 40 MG/1
40 CAPSULE, DELAYED RELEASE ORAL
Qty: 90 CAPSULE | Refills: 3 | Status: SHIPPED | OUTPATIENT
Start: 2019-09-11 | End: 2020-09-10

## 2019-09-11 NOTE — PROGRESS NOTES
Subjective:   Patient ID: Bing Burch is a 58 y.o. female     Chief Complaint:Follow-up (6 months)      Patient here for checkup.  Patient doing well.    Review of Systems   Constitutional: Negative for activity change, chills, fever and unexpected weight change.   HENT: Positive for rhinorrhea. Negative for hearing loss, sore throat and trouble swallowing.    Eyes: Negative for discharge and visual disturbance.   Respiratory: Negative for chest tightness, shortness of breath and wheezing.    Cardiovascular: Negative for chest pain and palpitations.   Gastrointestinal: Positive for vomiting. Negative for abdominal pain, blood in stool, constipation and diarrhea.   Endocrine: Positive for polyuria. Negative for polydipsia.   Genitourinary: Negative for difficulty urinating, dysuria, hematuria and menstrual problem.   Musculoskeletal: Positive for arthralgias and joint swelling. Negative for back pain and neck pain.   Skin: Negative for color change.   Neurological: Negative for weakness and headaches.   Psychiatric/Behavioral: Negative for agitation, confusion and dysphoric mood.     Past Medical History:   Diagnosis Date    Basal cell carcinoma 10/2018    left alar groove MOHS    GERD (gastroesophageal reflux disease)     Irritable bowel syndrome     mixed type, with slightly predominant constipation     Objective:     Vitals:    09/11/19 0735   BP: 112/82   Pulse: 65   Temp: 97.9 °F (36.6 °C)     Body mass index is 30.85 kg/m².  Physical Exam   Constitutional: No distress.   HENT:   Head: Normocephalic and atraumatic.   Eyes: EOM are normal.   Cardiovascular: Normal rate and normal heart sounds.   Pulmonary/Chest: Effort normal.   Abdominal: Bowel sounds are normal.   Musculoskeletal: Normal range of motion.   Neurological: She is alert.   Psychiatric: She has a normal mood and affect.     Assessment:     1. Low serum vitamin D    2. Gastroesophageal reflux disease without esophagitis    3.  Gastroesophageal reflux disease, esophagitis presence not specified      Plan:   Low serum vitamin D  -     Lipid panel; Future; Expected date: 09/11/2019  -     Comprehensive metabolic panel; Future; Expected date: 09/11/2019  -     Vitamin D; Future; Expected date: 09/11/2019    Gastroesophageal reflux disease without esophagitis  -     Discontinue: esomeprazole (NEXIUM) 40 MG capsule; Take 1 capsule (40 mg total) by mouth before breakfast.  Dispense: 90 capsule; Refill: 3  -     Lipid panel; Future; Expected date: 09/11/2019  -     Comprehensive metabolic panel; Future; Expected date: 09/11/2019  -     Vitamin D; Future; Expected date: 09/11/2019  -     esomeprazole (NEXIUM) 40 MG capsule; Take 1 capsule (40 mg total) by mouth before breakfast.  Dispense: 90 capsule; Refill: 3    Gastroesophageal reflux disease, esophagitis presence not specified  -     Ambulatory referral to Gastroenterology    Other orders  -     Influenza - Quadrivalent (6 months+) (PF)            Tej Blankenship MD  09/11/2019    Portions of this note have been dictated with DONA Siu.

## 2019-09-11 NOTE — TELEPHONE ENCOUNTER
Called patient in reference to her breast MRI imaging and scheduling a breast biopsy . No answer. Left the patient a message with my direct phone number.

## 2019-09-12 ENCOUNTER — TELEPHONE (OUTPATIENT)
Dept: RADIOLOGY | Facility: HOSPITAL | Age: 59
End: 2019-09-12

## 2019-09-12 NOTE — TELEPHONE ENCOUNTER
Spoke with patient. Reviewed MRI breast biopsy procedure and reviewed instructions for MRI breast biopsy. Patient expressed understanding and all questions were answered. Provided patient with my phone number to call for any further concerns or questions.   Patient scheduled MRI breast biopsy for 9/18/2019.

## 2019-09-13 ENCOUNTER — LAB VISIT (OUTPATIENT)
Dept: LAB | Facility: HOSPITAL | Age: 59
End: 2019-09-13
Attending: FAMILY MEDICINE
Payer: COMMERCIAL

## 2019-09-13 ENCOUNTER — TELEPHONE (OUTPATIENT)
Dept: SURGERY | Facility: CLINIC | Age: 59
End: 2019-09-13

## 2019-09-13 DIAGNOSIS — R79.89 LOW SERUM VITAMIN D: ICD-10-CM

## 2019-09-13 DIAGNOSIS — K21.9 GASTROESOPHAGEAL REFLUX DISEASE WITHOUT ESOPHAGITIS: ICD-10-CM

## 2019-09-13 LAB
25(OH)D3+25(OH)D2 SERPL-MCNC: 27 NG/ML (ref 30–96)
ALBUMIN SERPL BCP-MCNC: 3.6 G/DL (ref 3.5–5.2)
ALP SERPL-CCNC: 88 U/L (ref 55–135)
ALT SERPL W/O P-5'-P-CCNC: 18 U/L (ref 10–44)
ANION GAP SERPL CALC-SCNC: 9 MMOL/L (ref 8–16)
AST SERPL-CCNC: 11 U/L (ref 10–40)
BILIRUB SERPL-MCNC: 0.8 MG/DL (ref 0.1–1)
BUN SERPL-MCNC: 19 MG/DL (ref 6–20)
CALCIUM SERPL-MCNC: 9.6 MG/DL (ref 8.7–10.5)
CHLORIDE SERPL-SCNC: 102 MMOL/L (ref 95–110)
CHOLEST SERPL-MCNC: 217 MG/DL (ref 120–199)
CHOLEST/HDLC SERPL: 3.3 {RATIO} (ref 2–5)
CO2 SERPL-SCNC: 27 MMOL/L (ref 23–29)
CREAT SERPL-MCNC: 0.7 MG/DL (ref 0.5–1.4)
EST. GFR  (AFRICAN AMERICAN): >60 ML/MIN/1.73 M^2
EST. GFR  (NON AFRICAN AMERICAN): >60 ML/MIN/1.73 M^2
GLUCOSE SERPL-MCNC: 99 MG/DL (ref 70–110)
HDLC SERPL-MCNC: 65 MG/DL (ref 40–75)
HDLC SERPL: 30 % (ref 20–50)
LDLC SERPL CALC-MCNC: 121 MG/DL (ref 63–159)
NONHDLC SERPL-MCNC: 152 MG/DL
POTASSIUM SERPL-SCNC: 4.4 MMOL/L (ref 3.5–5.1)
PROT SERPL-MCNC: 7.1 G/DL (ref 6–8.4)
SODIUM SERPL-SCNC: 138 MMOL/L (ref 136–145)
TRIGL SERPL-MCNC: 155 MG/DL (ref 30–150)

## 2019-09-13 PROCEDURE — 80061 LIPID PANEL: CPT

## 2019-09-13 PROCEDURE — 36415 COLL VENOUS BLD VENIPUNCTURE: CPT | Mod: PO

## 2019-09-13 PROCEDURE — 82306 VITAMIN D 25 HYDROXY: CPT

## 2019-09-13 PROCEDURE — 80053 COMPREHEN METABOLIC PANEL: CPT

## 2019-09-13 RX ORDER — DIAZEPAM 5 MG/1
5 TABLET ORAL DAILY PRN
Qty: 2 TABLET | Refills: 0 | Status: SHIPPED | OUTPATIENT
Start: 2019-09-13 | End: 2020-03-11 | Stop reason: ALTCHOICE

## 2019-09-13 NOTE — TELEPHONE ENCOUNTER
Contacted the patient regarding the message below. Patient voiced understanding of this information..         ----- Message from BRIGID Ray sent at 9/13/2019  3:01 PM CDT -----  Done.  April, Can you call and let her know the valium is ready to be picked up at [facility] in [place]?    ~Adela    ----- Message -----  From: Damian Varner MD  Sent: 9/12/2019   8:51 PM  To: Az Keene DNP, BRIGID Ray, could please prescribe this patient 5 mg of valium orally for her breast MRI.  Thanks, [facility]  ----- Message -----  From: Az Keene DNP  Sent: 9/12/2019   1:55 PM  To: Damian Varner MD, MD Dr. Gardenia Mcbride,    This patient of [facility] is scheduled for an MRI biopsy next week and is requesting PO sedation, per Dr. Sanchez.  Can you please assist with this?    Thanks,  Az  ----- Message -----  From: Sonja Sanchez MD  Sent: 9/12/2019   1:48 PM  To: Az Keene DNP    Can you get Ms. Burch something to take before her Mri bx next Wednesday?   Thanks!

## 2019-09-18 ENCOUNTER — HOSPITAL ENCOUNTER (OUTPATIENT)
Dept: RADIOLOGY | Facility: HOSPITAL | Age: 59
Discharge: HOME | End: 2019-09-18
Attending: NURSE PRACTITIONER
Payer: COMMERCIAL

## 2019-09-18 DIAGNOSIS — Z91.89 AT HIGH RISK FOR BREAST CANCER: ICD-10-CM

## 2019-09-18 DIAGNOSIS — R92.8 ABNORMAL MRI, BREAST: ICD-10-CM

## 2019-09-18 PROCEDURE — 25000003 PHARM REV CODE 250: Performed by: NURSE PRACTITIONER

## 2019-09-18 PROCEDURE — 77065 DX MAMMO INCL CAD UNI: CPT | Mod: TC,RT

## 2019-09-18 PROCEDURE — 19085 MRI BREAST BIOPSY WITH IMAGING 1ST SITE: ICD-10-PCS | Mod: ,,, | Performed by: RADIOLOGY

## 2019-09-18 PROCEDURE — 88305 TISSUE EXAM BY PATHOLOGIST: CPT | Performed by: PATHOLOGY

## 2019-09-18 PROCEDURE — 25500020 PHARM REV CODE 255: Performed by: NURSE PRACTITIONER

## 2019-09-18 PROCEDURE — 19085 BX BREAST 1ST LESION MR IMAG: CPT | Mod: ,,, | Performed by: RADIOLOGY

## 2019-09-18 PROCEDURE — 88305 TISSUE EXAM BY PATHOLOGIST: CPT | Mod: 26,,, | Performed by: PATHOLOGY

## 2019-09-18 PROCEDURE — 88305 TISSUE EXAM BY PATHOLOGIST: ICD-10-PCS | Mod: 26,,, | Performed by: PATHOLOGY

## 2019-09-18 PROCEDURE — 77065 DX MAMMO INCL CAD UNI: CPT | Mod: 26,RT,, | Performed by: RADIOLOGY

## 2019-09-18 PROCEDURE — A9577 INJ MULTIHANCE: HCPCS | Performed by: NURSE PRACTITIONER

## 2019-09-18 PROCEDURE — 77065 MAMMO DIGITAL DIAGNOSTIC RIGHT WITH CAD: ICD-10-PCS | Mod: 26,RT,, | Performed by: RADIOLOGY

## 2019-09-18 PROCEDURE — 19085 BX BREAST 1ST LESION MR IMAG: CPT | Mod: TC

## 2019-09-18 RX ORDER — LIDOCAINE HCL/EPINEPHRINE/PF 2%-1:200K
18 VIAL (ML) INJECTION ONCE
Status: COMPLETED | OUTPATIENT
Start: 2019-09-18 | End: 2019-09-18

## 2019-09-18 RX ORDER — LIDOCAINE HYDROCHLORIDE 10 MG/ML
1 INJECTION, SOLUTION EPIDURAL; INFILTRATION; INTRACAUDAL; PERINEURAL ONCE
Status: DISCONTINUED | OUTPATIENT
Start: 2019-09-18 | End: 2019-09-19 | Stop reason: HOSPADM

## 2019-09-18 RX ADMIN — LIDOCAINE HYDROCHLORIDE,EPINEPHRINE BITARTRATE 18 ML: 20; .005 INJECTION, SOLUTION EPIDURAL; INFILTRATION; INTRACAUDAL; PERINEURAL at 08:09

## 2019-09-18 RX ADMIN — GADOBENATE DIMEGLUMINE 19 ML: 529 INJECTION, SOLUTION INTRAVENOUS at 08:09

## 2019-09-20 ENCOUNTER — TELEPHONE (OUTPATIENT)
Dept: SURGERY | Facility: CLINIC | Age: 59
End: 2019-09-20

## 2019-09-20 NOTE — TELEPHONE ENCOUNTER
Patient called with results of breast biopsy from 9/18/2019,   no atypia/benign, all questions answered, pt advised to follow up in 6 months with repeat imaging per core biopsy protocol, advised case will be reviewed in the weekly core conference and pt will be notified if there are any changes. Patient verbalized understanding

## 2019-09-27 ENCOUNTER — DOCUMENTATION ONLY (OUTPATIENT)
Dept: SURGERY | Facility: CLINIC | Age: 59
End: 2019-09-27

## 2019-09-27 NOTE — Clinical Note
Billy pt needs 6-month post-biopsy follow-up imaging around 3/18/20.  Please place order under Adela's name.  Thank you both.  -Az

## 2019-10-09 ENCOUNTER — OFFICE VISIT (OUTPATIENT)
Dept: GASTROENTEROLOGY | Facility: CLINIC | Age: 59
End: 2019-10-09
Payer: COMMERCIAL

## 2019-10-09 VITALS
BODY MASS INDEX: 31.6 KG/M2 | HEART RATE: 87 BPM | DIASTOLIC BLOOD PRESSURE: 79 MMHG | HEIGHT: 66 IN | SYSTOLIC BLOOD PRESSURE: 122 MMHG | WEIGHT: 196.63 LBS

## 2019-10-09 DIAGNOSIS — R10.13 EPIGASTRIC PAIN: ICD-10-CM

## 2019-10-09 DIAGNOSIS — R13.10 DYSPHAGIA, UNSPECIFIED TYPE: ICD-10-CM

## 2019-10-09 DIAGNOSIS — R12 HEARTBURN: ICD-10-CM

## 2019-10-09 DIAGNOSIS — K21.9 GASTROESOPHAGEAL REFLUX DISEASE WITHOUT ESOPHAGITIS: Primary | ICD-10-CM

## 2019-10-09 PROCEDURE — 3008F PR BODY MASS INDEX (BMI) DOCUMENTED: ICD-10-PCS | Mod: CPTII,S$GLB,, | Performed by: INTERNAL MEDICINE

## 2019-10-09 PROCEDURE — 99214 PR OFFICE/OUTPT VISIT, EST, LEVL IV, 30-39 MIN: ICD-10-PCS | Mod: S$GLB,,, | Performed by: INTERNAL MEDICINE

## 2019-10-09 PROCEDURE — 99999 PR PBB SHADOW E&M-EST. PATIENT-LVL III: CPT | Mod: PBBFAC,,, | Performed by: INTERNAL MEDICINE

## 2019-10-09 PROCEDURE — 3008F BODY MASS INDEX DOCD: CPT | Mod: CPTII,S$GLB,, | Performed by: INTERNAL MEDICINE

## 2019-10-09 PROCEDURE — 99214 OFFICE O/P EST MOD 30 MIN: CPT | Mod: S$GLB,,, | Performed by: INTERNAL MEDICINE

## 2019-10-09 PROCEDURE — 99999 PR PBB SHADOW E&M-EST. PATIENT-LVL III: ICD-10-PCS | Mod: PBBFAC,,, | Performed by: INTERNAL MEDICINE

## 2019-10-09 NOTE — LETTER
October 9, 2019      Tej Blankenship MD  [address] [address]           [facility]  [address]  [place] [address]  Phone: [phone]          Patient: Bing Burch   MR Number: [id number]   YOB: 1960   Date of Visit: 10/9/2019       Dear Dr. Tej Blankenship:    Thank you for referring Bing Burch to me for evaluation. Attached you will find relevant portions of my assessment and plan of care.    If you have questions, please do not hesitate to call me. I look forward to following Bing Burch along with you.    Sincerely,    Attila Wei MD    Enclosure  CC:  No Recipients    If you would like to receive this communication electronically, please contact [email or web address] or [phone] to request more information on [employer] access.    For providers and/or their staff who would like to refer a patient to [facility], please contact us through our one-stop-shop provider referral line, [facility], at [phone].    If you feel you have received this communication in error or would no longer like to receive these types of communications, please e-mail [email or web address]

## 2019-10-09 NOTE — H&P (VIEW-ONLY)
Subjective:       Patient ID: Bing Burch is a 58 y.o. female.    This is an established patient.        Chief Complaint: Gastroesophageal Reflux    PAST HISTORY:    Gastroesophageal Reflux   She complains of abdominal pain (epigastric) and heartburn. She reports no chest pain, no coughing, no dysphagia, no nausea, no sore throat or no wheezing. This is a new problem. The current episode started more than 1 month ago (about 6 months). The problem occurs frequently. The problem has been unchanged (abrupt onset). The heartburn duration is several minutes. The heartburn is of moderate intensity. The heartburn wakes her from sleep. The heartburn does not limit her activity. The heartburn changes with position. The symptoms are aggravated by certain foods. Pertinent negatives include no anemia, fatigue, melena or weight loss. Risk factors include hiatal hernia (Had Nissen in 2013 with no symptoms after surgery until now.  Prior to surgery had severe GERD). She has tried a PPI for the symptoms. The treatment provided mild relief. Past procedures include an EGD (last one was before surgery). Colonoscopy 7 years ago was normal per patient. Past invasive treatments include gastroplication.   Notes from Dr. Friedman indicate that the above symptoms are now ongoing x several months and she is referred for further evaluation.  She also has chronic constipation but no bleeding or weight loss.    INTERVAL HISTORY:  Since last visit, patient has done for a while.  Now, patient seen for dysphagia, occurring with solid foods, frequency several times per week usually a few hours after eating and worse at night, alleviated/exacerbated by certain foods (rich/fatty foods), associated signs/symptoms including occasional nonbloody emesis/GERD/heartburn, onset a couple of months ago.  She states that complaints are waxing and waning.  She has tried diet adjustment with little success.  She states that she remains compliant with  "Nexium which helps but she still having breakthrough symptoms which appear to be worsening lately.  No bowel problems or lower GI complaints.        Review of Systems   Constitutional: Negative for chills, fatigue, fever and weight loss.   HENT: Positive for trouble swallowing. Negative for sore throat.    Respiratory: Negative for cough, shortness of breath and wheezing.    Cardiovascular: Negative for chest pain and palpitations.   Gastrointestinal: Positive for abdominal pain (epigastric) and heartburn. Negative for blood in stool, dysphagia, melena, nausea and vomiting.   Genitourinary: Negative for dysuria and hematuria.   Skin: Negative for color change and rash.   Neurological: Negative for dizziness.   All other systems reviewed and are negative.      Objective:       Vitals:    10/09/19 1307   BP: 122/79   Pulse: 87   Weight: 89.2 kg (196 lb 10.4 oz)   Height: 5' 6" (1.676 m)       Physical Exam   Constitutional: She is oriented to person, place, and time. She appears well-developed and well-nourished.   HENT:   Head: Normocephalic and atraumatic.   Eyes: Pupils are equal, round, and reactive to light. No scleral icterus.   Cardiovascular: Normal rate and regular rhythm.   No murmur heard.  Pulmonary/Chest: Effort normal and breath sounds normal. She has no wheezes.   Abdominal: Soft. Bowel sounds are normal. She exhibits no distension. There is no tenderness.   Neurological: She is alert and oriented to person, place, and time.         Lab Results   Component Value Date    WBC 8.40 09/03/2018    HGB 13.2 09/03/2018    HCT 40.0 09/03/2018    MCV 90 09/03/2018     09/03/2018       CMP  Sodium   Date Value Ref Range Status   09/13/2019 138 136 - 145 mmol/L Final     Potassium   Date Value Ref Range Status   09/13/2019 4.4 3.5 - 5.1 mmol/L Final     Chloride   Date Value Ref Range Status   09/13/2019 102 95 - 110 mmol/L Final     CO2   Date Value Ref Range Status   09/13/2019 27 23 - 29 mmol/L Final "     Glucose   Date Value Ref Range Status   09/13/2019 99 70 - 110 mg/dL Final     BUN, Bld   Date Value Ref Range Status   09/13/2019 19 6 - 20 mg/dL Final     Creatinine   Date Value Ref Range Status   09/13/2019 0.7 0.5 - 1.4 mg/dL Final     Calcium   Date Value Ref Range Status   09/13/2019 9.6 8.7 - 10.5 mg/dL Final     Total Protein   Date Value Ref Range Status   09/13/2019 7.1 6.0 - 8.4 g/dL Final     Albumin   Date Value Ref Range Status   09/13/2019 3.6 3.5 - 5.2 g/dL Final     Total Bilirubin   Date Value Ref Range Status   09/13/2019 0.8 0.1 - 1.0 mg/dL Final     Comment:     For infants and newborns, interpretation of results should be based  on gestational age, weight and in agreement with clinical  observations.  Premature Infant recommended reference ranges:  Up to 24 hours.............<8.0 mg/dL  Up to 48 hours............<12.0 mg/dL  3-5 days..................<15.0 mg/dL  6-29 days.................<15.0 mg/dL       Alkaline Phosphatase   Date Value Ref Range Status   09/13/2019 88 55 - 135 U/L Final     AST   Date Value Ref Range Status   09/13/2019 11 10 - 40 U/L Final     ALT   Date Value Ref Range Status   09/13/2019 18 10 - 44 U/L Final     Anion Gap   Date Value Ref Range Status   09/13/2019 9 8 - 16 mmol/L Final     eGFR if    Date Value Ref Range Status   09/13/2019 >60.0 >60 mL/min/1.73 m^2 Final     eGFR if non    Date Value Ref Range Status   09/13/2019 >60.0 >60 mL/min/1.73 m^2 Final     Comment:     Calculation used to obtain the estimated glomerular filtration  rate (eGFR) is the CKD-EPI equation.          CT scan was independently visualized and reviewed by me and showed fundoplication.  She also had questionable haziness at pancreatic body with no mass noted and normal enzymes.    Old records from Dr. Friedman reviewed and are as summarized above in the HPI.      Assessment:       1. Gastroesophageal reflux disease without esophagitis    2. Dysphagia,  unspecified type    3. Heartburn        Plan:       1.  Schedule EGD  2.  Continue PPI.  May need to consider switch within class.  3.  Colonoscopy due for screening in 2020  4.  Antireflux precautions including avoidance of late night eating and lying down soon after eating.     5.  Further recommendations to follow after above.    ADDENDUM:  Biopsy report from 2010 reviewed and showed gastritis without h.pylori infection and fundic gland polyp.  No Franklin's esophagus noted.

## 2019-10-15 ENCOUNTER — HOSPITAL ENCOUNTER (OUTPATIENT)
Dept: RADIOLOGY | Facility: CLINIC | Age: 59
Discharge: HOME | End: 2019-10-15
Attending: INTERNAL MEDICINE
Payer: COMMERCIAL

## 2019-10-15 DIAGNOSIS — R10.13 EPIGASTRIC PAIN: ICD-10-CM

## 2019-10-15 DIAGNOSIS — K21.9 GASTROESOPHAGEAL REFLUX DISEASE WITHOUT ESOPHAGITIS: ICD-10-CM

## 2019-10-15 DIAGNOSIS — R13.10 DYSPHAGIA, UNSPECIFIED TYPE: ICD-10-CM

## 2019-10-15 DIAGNOSIS — R12 HEARTBURN: ICD-10-CM

## 2019-10-15 PROCEDURE — 76700 US EXAM ABDOM COMPLETE: CPT | Mod: 26,,, | Performed by: RADIOLOGY

## 2019-10-15 PROCEDURE — 76700 US EXAM ABDOM COMPLETE: CPT | Mod: TC,PO

## 2019-10-15 PROCEDURE — 76700 US ABDOMEN COMPLETE: ICD-10-PCS | Mod: 26,,, | Performed by: RADIOLOGY

## 2019-11-04 ENCOUNTER — ANESTHESIA EVENT (OUTPATIENT)
Dept: ENDOSCOPY | Facility: HOSPITAL | Age: 59
End: 2019-11-04
Payer: COMMERCIAL

## 2019-11-04 ENCOUNTER — ANESTHESIA (OUTPATIENT)
Dept: ENDOSCOPY | Facility: HOSPITAL | Age: 59
End: 2019-11-04
Payer: COMMERCIAL

## 2019-11-04 ENCOUNTER — HOSPITAL ENCOUNTER (OUTPATIENT)
Facility: HOSPITAL | Age: 59
Discharge: HOME | End: 2019-11-04
Attending: INTERNAL MEDICINE | Admitting: INTERNAL MEDICINE
Payer: COMMERCIAL

## 2019-11-04 VITALS
SYSTOLIC BLOOD PRESSURE: 132 MMHG | RESPIRATION RATE: 16 BRPM | HEART RATE: 72 BPM | TEMPERATURE: 98 F | OXYGEN SATURATION: 99 % | WEIGHT: 196 LBS | BODY MASS INDEX: 31.5 KG/M2 | HEIGHT: 66 IN | DIASTOLIC BLOOD PRESSURE: 68 MMHG

## 2019-11-04 DIAGNOSIS — R13.10 DYSPHAGIA: ICD-10-CM

## 2019-11-04 DIAGNOSIS — K22.2 SCHATZKI'S RING: ICD-10-CM

## 2019-11-04 DIAGNOSIS — K29.70 GASTRITIS, PRESENCE OF BLEEDING UNSPECIFIED, UNSPECIFIED CHRONICITY, UNSPECIFIED GASTRITIS TYPE: ICD-10-CM

## 2019-11-04 DIAGNOSIS — K44.9 HIATAL HERNIA: Primary | ICD-10-CM

## 2019-11-04 PROCEDURE — D9220A PRA ANESTHESIA: ICD-10-PCS | Mod: CRNA,,, | Performed by: NURSE ANESTHETIST, CERTIFIED REGISTERED

## 2019-11-04 PROCEDURE — D9220A PRA ANESTHESIA: Mod: ANES,,, | Performed by: ANESTHESIOLOGY

## 2019-11-04 PROCEDURE — D9220A PRA ANESTHESIA: Mod: CRNA,,, | Performed by: NURSE ANESTHETIST, CERTIFIED REGISTERED

## 2019-11-04 PROCEDURE — 43248 EGD GUIDE WIRE INSERTION: CPT | Performed by: INTERNAL MEDICINE

## 2019-11-04 PROCEDURE — 88342 TISSUE SPECIMEN TO PATHOLOGY - SURGERY: ICD-10-PCS | Mod: 26,,, | Performed by: PATHOLOGY

## 2019-11-04 PROCEDURE — 88342 IMHCHEM/IMCYTCHM 1ST ANTB: CPT | Mod: 26,,, | Performed by: PATHOLOGY

## 2019-11-04 PROCEDURE — 43248 PR EGD, FLEX, W/DILATION OVER GUIDEWIRE: ICD-10-PCS | Mod: ,,, | Performed by: INTERNAL MEDICINE

## 2019-11-04 PROCEDURE — 37000008 HC ANESTHESIA 1ST 15 MINUTES: Performed by: INTERNAL MEDICINE

## 2019-11-04 PROCEDURE — 88305 TISSUE EXAM BY PATHOLOGIST: CPT | Performed by: PATHOLOGY

## 2019-11-04 PROCEDURE — 43239 EGD BIOPSY SINGLE/MULTIPLE: CPT | Mod: 59,,, | Performed by: INTERNAL MEDICINE

## 2019-11-04 PROCEDURE — 43239 EGD BIOPSY SINGLE/MULTIPLE: CPT | Performed by: INTERNAL MEDICINE

## 2019-11-04 PROCEDURE — 63600175 PHARM REV CODE 636 W HCPCS: Performed by: NURSE ANESTHETIST, CERTIFIED REGISTERED

## 2019-11-04 PROCEDURE — 43239 PR EGD, FLEX, W/BIOPSY, SGL/MULTI: ICD-10-PCS | Mod: 59,,, | Performed by: INTERNAL MEDICINE

## 2019-11-04 PROCEDURE — 88305 TISSUE SPECIMEN TO PATHOLOGY - SURGERY: ICD-10-PCS | Mod: 26,,, | Performed by: PATHOLOGY

## 2019-11-04 PROCEDURE — 43248 EGD GUIDE WIRE INSERTION: CPT | Mod: ,,, | Performed by: INTERNAL MEDICINE

## 2019-11-04 PROCEDURE — 27201012 HC FORCEPS, HOT/COLD, DISP: Performed by: INTERNAL MEDICINE

## 2019-11-04 PROCEDURE — 63600175 PHARM REV CODE 636 W HCPCS: Performed by: INTERNAL MEDICINE

## 2019-11-04 PROCEDURE — D9220A PRA ANESTHESIA: ICD-10-PCS | Mod: ANES,,, | Performed by: ANESTHESIOLOGY

## 2019-11-04 RX ORDER — PROPOFOL 10 MG/ML
VIAL (ML) INTRAVENOUS
Status: DISCONTINUED | OUTPATIENT
Start: 2019-11-04 | End: 2019-11-04

## 2019-11-04 RX ORDER — HYDROXYZINE HYDROCHLORIDE 10 MG/1
25 TABLET, FILM COATED ORAL 3 TIMES DAILY PRN
COMMUNITY

## 2019-11-04 RX ORDER — SODIUM CHLORIDE 9 MG/ML
INJECTION, SOLUTION INTRAVENOUS CONTINUOUS
Status: DISCONTINUED | OUTPATIENT
Start: 2019-11-04 | End: 2019-11-04 | Stop reason: HOSPADM

## 2019-11-04 RX ORDER — LIDOCAINE HCL/PF 100 MG/5ML
SYRINGE (ML) INTRAVENOUS
Status: DISCONTINUED | OUTPATIENT
Start: 2019-11-04 | End: 2019-11-04

## 2019-11-04 RX ADMIN — PROPOFOL 100 MG: 10 INJECTION, EMULSION INTRAVENOUS at 09:11

## 2019-11-04 RX ADMIN — LIDOCAINE HYDROCHLORIDE 75 MG: 20 INJECTION, SOLUTION INTRAVENOUS at 09:11

## 2019-11-04 RX ADMIN — PROPOFOL 30 MG: 10 INJECTION, EMULSION INTRAVENOUS at 09:11

## 2019-11-04 RX ADMIN — SODIUM CHLORIDE: 0.9 INJECTION, SOLUTION INTRAVENOUS at 08:11

## 2019-11-04 RX ADMIN — PROPOFOL 50 MG: 10 INJECTION, EMULSION INTRAVENOUS at 09:11

## 2019-11-04 NOTE — DISCHARGE INSTRUCTIONS
Esophageal Dilation     A balloon dilator may be used to widen a stricture in the esophagus.   An esophageal dilation is a procedure used to widen a narrowed section of your esophagus. This is the tube that leads from your throat to your stomach. Narrowing (stricture) of the esophagus can cause problems. These include trouble swallowing. This sheet explains what to expect with esophageal dilation.  Why esophageal dilation is needed  Several problems can be treated with esophageal dilation. They include:  · Peptic stricture. This is caused by reflux esophagitis. With this problem, the esophagus is irritated by acid reflux (heartburn). This occurs when acid from your stomach flows back up into the esophagus. Stomach acid damages the lining of the esophagus. This leads to a buildup of scar tissue. As a result, the esophagus is narrowed.  · Schatzkis ring. This is an abnormal ring of tissue. It forms where the esophagus meets the stomach. It can cause trouble swallowing. It can also cause food to get stuck in the esophagus. The cause of this condition is not known.  · Achalasia. This condition stops food and liquids from moving into your stomach from the esophagus. It affects the lower esophageal sphincter (LES). The LES is a muscular ring that opens (relaxes) when you swallow. With achalasia, the LES does not relax. This condition can also cause problems with peristalsis. This is the normal muscular action of the esophagus that moves food into the stomach.  · Eosinophilic esophagitis. This is a redness and swelling (inflammation) in the esophagus. It is caused by an environmental trigger such as a food allergy. It can lead to pain, trouble swallowing, and strictures.  · Less common causes of stricture. Other causes of stricture include radiation treatment and cancer.  Before you have esophageal dilation  · Tell your provider about any medicines you take. This includes prescription medicines, over-the-counter  medicines, herbs, vitamins, and other supplements. Be sure to mention aspirin or any blood thinners youre taking.  · Let your provider know if you need to take antibiotics before dental procedures. You may need to take them before esophageal dilation as well.  · Tell your provider about any health conditions you have, such as heart or lung disease. Also mention any allergies to medicines.  · Youll need to have an empty stomach for the procedure. Follow your providers instructions for not eating and drinking before the procedure.  · Arrange to have a family member or friend drive you home after the procedure.  During the procedure  · You may be given local anesthesia to numb your throat. Youll also likely be given medicine to relax you. The procedure takes about 15 minutes. It does not cause trouble breathing.  · A tube called an endoscope (scope) is used. This is a narrow tube with a tiny light and camera at the end. The scope is inserted through your mouth and into your esophagus. It lets your provider see inside your esophagus. To help guide your provider, an imaging method called fluoroscopy may also be used. This creates a moving X-ray image on a computer screen.   · Next, special tiny tools are carefully guided through your mouth and down into the esophagus. They widen the stricture and are then removed. Different types of instruments are used. The type used depends on the size and cause of the stricture. Types include:  ¨ Balloon dilator. A tiny empty balloon is put into the stricture using an endoscope. The balloon is slowly filled with air. The air is removed from the balloon when the stricture is widened to the right size. Balloon dilators are used to treat many types of strictures.  ¨ Guided wire dilator. A thin wire is eased down the esophagus. A small tube thats wider on one end is guided down the wire. It is put into the stricture to stretch it. These dilators are used to treat all kinds of  strictures.  ¨ Bougies. These are weighted, cone-shaped tubes. Starting with smaller cones, your provider uses increasingly larger cones until the stricture is stretched the right amount. Bougies are often used to treat strictures that are simple (short, straight, and not very narrow).  After the procedure  · Youll be watched closely until your provider says youre ready to go home. Youll need to have a friend or family member drive you home.  · You may have a sore throat for the rest of the day.  · You may have pain behind your breastbone for a short time afterwards.  · You can start drinking fluids again after the numbness in your throat goes away. You can resume eating the same day or the next day.  Risks and possible complications  Risks and possible complications for esophageal dilation include:  · Infection  · A tear or hole in the esophagus lining, causing bleeding and possibly needing surgery to fix  · Risks of anesthesia  Follow-up  You may need to have the procedure repeated one or more times. This depends on the cause and extent of the narrowing. Repeat procedures can allow the dilation to take place more slowly. This reduces the risks of the procedure.  If your stricture was caused by reflux esophagitis, youll likely need to take medicine to treat that condition. Your provider will tell you more.  When to call your provider  Call your healthcare provider right away if you have any of the following after the procedure:  · Fever of 100.4°F (38.0°C)  · Chest pain  · Trouble swallowing  · Vomiting blood or material that looks like coffee grounds  · Bleeding  · Black, tarry, or bloody stools   Date Last Reviewed: 7/1/2016 © 2000-2017 [employer]. [address] [place] [address], [place] [address]. All rights reserved. This information is not intended as a substitute for professional medical care. Always follow your healthcare professional's instructions.        What Is a Hiatal Hernia?    Hiatal  hernia is when the area where the stomach and esophagus meet bulges up through the diaphragm into the chest cavity. In some cases, part of the stomach may bulge above the diaphragm. Stomach acid may move up into the esophagus and cause symptoms. The symptoms are often blamed on gastroesophageal reflux disease (GERD). You may only know about the hernia when it shows up on an X-ray taken for other reasons.   What you may feel  The hiatus is a normal hole in the diaphragm. The esophagus passes through this hole and leads to the stomach. In some cases, part of the stomach may bulge above the diaphragm. This bulge is called a hernia. Stomach acid may move up into the esophagus and cause symptoms.  When you eat, the muscle at the hiatus relaxes to allow food to pass into the stomach. It tightens again to keep food and digestive acids in the stomach.  Many people with hiatal hernias have mild symptoms. You may notice the following GERD symptoms:  · Heartburn or other chest discomfort  · A feeling of chest fullness after a meal  · Frequent burping  · Acid taste in the mouth  · Trouble swallowing  Treating symptoms  If you have been diagnosed with hiatal hernia, these suggestions may help improve symptoms:  · Lose excess weight. Extra weight puts pressure on the stomach and esophagus.  · Dont lie down after eating. Sit up for at least an hour after eating. Lying down after eating can increase symptoms.  · Avoid certain foods and drinks. These include fatty foods, chocolate, coffee, mint, and other foods that cause symptoms for you.  · Dont smoke or drink alcohol. These can worsen symptoms.  · Look at your medicines. Discuss your medicines with your healthcare provider. Many medicines can cause symptoms.  · Consider an antacid medicine. Ask your healthcare provider about over-the-counter and prescription medicines that may help.  · Ask about surgery, if needed. Surgery is a treatment choice for some people. Your healthcare  provider can determine if surgery is an option for you.    Date Last Reviewed: 10/1/2016  © 6649-6796 [employer]. [address] [place] [address], [place] [address]. All rights reserved. This information is not intended as a substitute for professional medical care. Always follow your healthcare professional's instructions.        Gastritis (Adult)    Gastritis is inflammation and irritation of the stomach lining. It can be present for a short time (acute) or be long lasting (chronic). Gastritis is often caused by infection with bacteria called H pylori. More than a third of people in the [place] have this bacteria in their bodies. In many cases, H pylori causes no problems or symptoms. In some people, though, the infection irritates the stomach lining and causes gastritis. Other causes of stomach irritation include drinking alcohol or taking pain-relieving medicines called NSAIDs (such as aspirin or ibuprofen).   Symptoms of gastritis can include:  · Abdominal pain or bloating  · Loss of appetite  · Nausea or vomiting  · Vomiting blood or having black stools  · Feeling more tired than usual  An inflamed and irritated stomach lining is more likely to develop a sore called an ulcer. To help prevent this, gastritis should be treated.  Home care  If needed, medicines may be prescribed. If you have H pylori infection, treating it will likely relieve your symptoms. Other changes can help reduce stomach irritation and help it heal.  · If you have been prescribed medicines for H pylori infection, take them as directed. Take all of the medicine until it is finished or your healthcare provider tells you to stop, even if you feel better.  · Your healthcare provider may recommend avoiding NSAIDs. If you take daily aspirin for your heart or other medical reasons, do not stop without talking to your healthcare provider first.  · Avoid drinking alcohol.  · Stop smoking. Smoking can irritate the stomach and delay healing. As much  as possible, stay away from second hand smoke.  Follow-up care  Follow up with your healthcare provider, or as advised by our staff. Testing may be needed to check for inflammation or an ulcer.  When to seek medical advice  Call your healthcare provider for any of the following:  · Stomach pain that gets worse or moves to the lower right abdomen (appendix area)  · Chest pain that appears or gets worse, or spreads to the back, neck, shoulder, or arm  · Frequent vomiting (cant keep down liquids)  · Blood in the stool or vomit (red or black in color)  · Feeling weak or dizzy  · Fever of 100.4ºF (38ºC) or higher, or as directed by your healthcare provider  Date Last Reviewed: 6/22/2015  © 5940-6760 [employer]. [address] [place] [address], [place] [address]. All rights reserved. This information is not intended as a substitute for professional medical care. Always follow your healthcare professional's instructions.      Discharge Instructions: After Your Surgery/Procedure  Youve just had surgery. During surgery you were given medicine called anesthesia to keep you relaxed and free of pain. After surgery you may have some pain or nausea. This is common. Here are some tips for feeling better and getting well after surgery.     Stay on schedule with your medication.   Going home  Your doctor or nurse will show you how to take care of yourself when you go home. He or she will also answer your questions. Have an adult family member or friend drive you home.      For your safety we recommend these precaution for the first 24 hours after your procedure:  · Do not drive or use heavy equipment.  · Do not make important decisions or sign legal papers.  · Do not drink alcohol.  · Have someone stay with you, if needed. He or she can watch for problems and help keep you safe.  · Your concentration, balance, coordination, and judgement may be impaired for many hours after anesthesia.  Use caution when ambulating or  "standing up.     · You may feel weak and "washed out" after anesthesia and surgery.      Subtle residual effects of general anesthesia or sedation with regional / local anesthesia can last more than 24 hours.  Rest for the remainder of the day or longer if your Doctor/Surgeon has advised you to do so.  Although you may feel normal within the first 24 hours, your reflexes and mental ability may be impaired without you realizing it.  You may feel dizzy, lightheaded or sleepy for 24 hours or longer.      Be sure to go to all follow-up visits with your doctor. And rest after your surgery for as long as your doctor tells you to.  Coping with pain  If you have pain after surgery, pain medicine will help you feel better. Take it as told, before pain becomes severe. Also, ask your doctor or pharmacist about other ways to control pain. This might be with heat, ice, or relaxation. And follow any other instructions your surgeon or nurse gives you.  Tips for taking pain medicine  To get the best relief possible, remember these points:  · Pain medicines can upset your stomach. Taking them with a little food may help.  · Most pain relievers taken by mouth need at least 20 to 30 minutes to start to work.  · Taking medicine on a schedule can help you remember to take it. Try to time your medicine so that you can take it before starting an activity. This might be before you get dressed, go for a walk, or sit down for dinner.  · Constipation is a common side effect of pain medicines. Call your doctor before taking any medicines such as laxatives or stool softeners to help ease constipation. Also ask if you should skip any foods. Drinking lots of fluids and eating foods such as fruits and vegetables that are high in fiber can also help. Remember, do not take laxatives unless your surgeon has prescribed them.  · Drinking alcohol and taking pain medicine can cause dizziness and slow your breathing. It can even be deadly. Do not drink " alcohol while taking pain medicine.  · Pain medicine can make you react more slowly to things. Do not drive or run machinery while taking pain medicine.  Your health care provider may tell you to take acetaminophen to help ease your pain. Ask him or her how much you are supposed to take each day. Acetaminophen or other pain relievers may interact with your prescription medicines or other over-the-counter (OTC) drugs. Some prescription medicines have acetaminophen and other ingredients. Using both prescription and OTC acetaminophen for pain can cause you to overdose. Read the labels on your OTC medicines with care. This will help you to clearly know the list of ingredients, how much to take, and any warnings. It may also help you not take too much acetaminophen. If you have questions or do not understand the information, ask your pharmacist or health care provider to explain it to you before you take the OTC medicine.  Managing nausea  Some people have an upset stomach after surgery. This is often because of anesthesia, pain, or pain medicine, or the stress of surgery. These tips will help you handle nausea and eat healthy foods as you get better. If you were on a special food plan before surgery, ask your doctor if you should follow it while you get better. These tips may help:  · Do not push yourself to eat. Your body will tell you when to eat and how much.  · Start off with clear liquids and soup. They are easier to digest.  · Next try semi-solid foods, such as mashed potatoes, applesauce, and gelatin, as you feel ready.  · Slowly move to solid foods. Dont eat fatty, rich, or spicy foods at first.  · Do not force yourself to have 3 large meals a day. Instead eat smaller amounts more often.  · Take pain medicines with a small amount of solid food, such as crackers or toast, to avoid nausea.     Call your surgeon if  · You still have pain an hour after taking medicine. The medicine may not be strong enough.  · You  feel too sleepy, dizzy, or groggy. The medicine may be too strong.  · You have side effects like nausea, vomiting, or skin changes, such as rash, itching, or hives.       If you have obstructive sleep apnea  You were given anesthesia medicine during surgery to keep you comfortable and free of pain. After surgery, you may have more apnea spells because of this medicine and other medicines you were given. The spells may last longer than usual.   At home:  · Keep using the continuous positive airway pressure (CPAP) device when you sleep. Unless your health care provider tells you not to, use it when you sleep, day or night. CPAP is a common device used to treat obstructive sleep apnea.  · Talk with your provider before taking any pain medicine, muscle relaxants, or sedatives. Your provider will tell you about the possible dangers of taking these medicines.  © 8754-0774 [employer]. [address] [place] [address], [place] [address]. All rights reserved. This information is not intended as a substitute for professional medical care. Always follow your healthcare professional's instructions.

## 2019-11-04 NOTE — TRANSFER OF CARE
"Anesthesia Transfer of Care Note    Patient: Bing Burch    Procedure(s) Performed: Procedure(s) (LRB):  EGD (ESOPHAGOGASTRODUODENOSCOPY) (N/A)    Patient location: PACU    Anesthesia Type: general    Transport from OR: Transported from OR on room air with adequate spontaneous ventilation    Post pain: adequate analgesia    Post assessment: no apparent anesthetic complications    Post vital signs: stable    Level of consciousness: awake    Nausea/Vomiting: no nausea/vomiting    Complications: none    Transfer of care protocol was followed      Last vitals:   Visit Vitals  /76 (BP Location: Left arm, Patient Position: Lying)   Pulse 81   Temp 36.6 °C (97.9 °F) (Skin)   Resp 16   Ht 5' 6" (1.676 m)   Wt 88.9 kg (196 lb)   SpO2 97%   Breastfeeding? No   BMI 31.64 kg/m²     "

## 2019-11-04 NOTE — ANESTHESIA POSTPROCEDURE EVALUATION
Anesthesia Post Evaluation    Patient: Bing Burch    Procedure(s) Performed: Procedure(s) (LRB):  EGD (ESOPHAGOGASTRODUODENOSCOPY) (N/A)    Final Anesthesia Type: general  Patient location during evaluation: PACU  Patient participation: Yes- Able to Participate  Level of consciousness: awake and alert  Post-procedure vital signs: reviewed and stable  Pain management: adequate  Airway patency: patent  PONV status at discharge: No PONV  Anesthetic complications: no      Cardiovascular status: blood pressure returned to baseline and hemodynamically stable  Respiratory status: unassisted  Hydration status: euvolemic  Follow-up not needed.          Vitals Value Taken Time   /72 11/4/2019  9:29 AM   Temp 36.6 °C (97.9 °F) 11/4/2019  8:30 AM   Pulse 82 11/4/2019  9:29 AM   Resp 16 11/4/2019  9:29 AM   SpO2 97 % 11/4/2019  9:29 AM         No case tracking events are documented in the log.      Pain/Bran Score: No data recorded

## 2019-11-04 NOTE — PLAN OF CARE
Vss, eric po fluids, denies pain, ambulates easily. IV removed, catheter intact. Discharge instructions provided and states understanding. States ready to go home.  Discharged from facility with family per wheelchair.

## 2019-11-04 NOTE — [employer] PATIENT INSTRUCTIONS
Discharge Summary/Instructions after an Endoscopic Procedure  Patient Name: Bing Burch  Patient MRN: [id number]  Patient YOB: 1960 Monday, November 04, 2019  Attila Knowles MD  RESTRICTIONS:  During your procedure today, you received medications for sedation.  These   medications may affect your judgment, balance and coordination.  Therefore,   for 24 hours, you have the following restrictions:   - DO NOT drive a car, operate machinery, make legal/financial decisions,   sign important papers or drink alcohol.    ACTIVITY:  Today: no heavy lifting, straining or running due to procedural   sedation/anesthesia.  The following day: return to full activity including work.  DIET:  Eat and drink normally unless instructed otherwise.     TREATMENT FOR COMMON SIDE EFFECTS:  - Mild abdominal pain, nausea, belching, bloating or excessive gas:  rest,   eat lightly and use a heating pad.  - Sore Throat: treat with throat lozenges and/or gargle with warm salt   water.  - Because air was used during the procedure, expelling large amounts of air   from your rectum or belching is normal.  - If a bowel prep was taken, you may not have a bowel movement for 1-3 days.    This is normal.  SYMPTOMS TO WATCH FOR AND REPORT TO YOUR PHYSICIAN:  1. Abdominal pain or bloating, other than gas cramps.  2. Chest pain.  3. Back pain.  4. Signs of infection such as: chills or fever occurring within 24 hours   after the procedure.  5. Rectal bleeding, which would show as bright red, maroon, or black stools.   (A tablespoon of blood from the rectum is not serious, especially if   hemorrhoids are present.)  6. Vomiting.  7. Weakness or dizziness.  GO DIRECTLY TO THE NEAREST EMERGENCY ROOM IF YOU HAVE ANY OF THE FOLLOWING:      Difficulty breathing              Chills and/or fever over 101 F   Persistent vomiting and/or vomiting blood   Severe abdominal pain   Severe chest pain   Black, tarry stools   Bleeding- more than one  tablespoon   Any other symptom or condition that you feel may need urgent attention  Your doctor recommends these additional instructions:  If any biopsies were taken, your doctors clinic will contact you in 1 to 2   weeks with any results.  - Patient has a contact number available for emergencies.  The signs and   symptoms of potential delayed complications were discussed with the   patient.  Return to normal activities tomorrow.  Written discharge   instructions were provided to the patient.   - Resume previous diet.   - Continue present medications.   - No aspirin, ibuprofen, naproxen, or other non-steroidal anti-inflammatory   drugs.   - Await pathology results.   - Discharge patient to home (ambulatory).   - Follow an antireflux regimen.   - Return to my office in 4 weeks.  For questions, problems or results please call your physician - Attila Knowles MD at Work:  [phone].  [facility] [place], EMERGENCY ROOM PHONE NUMBER: [phone]  IF A COMPLICATION OR EMERGENCY SITUATION ARISES AND YOU ARE UNABLE TO REACH   YOUR PHYSICIAN - GO DIRECTLY TO THE EMERGENCY ROOM.  Attila Knowles MD  11/4/2019 9:30:13 AM  This report has been verified and signed electronically.  [employer]

## 2019-11-07 ENCOUNTER — OFFICE VISIT (OUTPATIENT)
Dept: DERMATOLOGY | Facility: CLINIC | Age: 59
End: 2019-11-07
Payer: COMMERCIAL

## 2019-11-07 VITALS — HEIGHT: 66 IN | BODY MASS INDEX: 31.5 KG/M2 | WEIGHT: 196 LBS

## 2019-11-07 DIAGNOSIS — D18.01 CHERRY ANGIOMA: ICD-10-CM

## 2019-11-07 DIAGNOSIS — D22.9 MULTIPLE BENIGN NEVI: ICD-10-CM

## 2019-11-07 DIAGNOSIS — L81.4 SOLAR LENTIGO: ICD-10-CM

## 2019-11-07 DIAGNOSIS — B07.8 COMMON WART: ICD-10-CM

## 2019-11-07 DIAGNOSIS — L82.1 SEBORRHEIC KERATOSES: ICD-10-CM

## 2019-11-07 DIAGNOSIS — L57.0 ACTINIC KERATOSES: Primary | ICD-10-CM

## 2019-11-07 DIAGNOSIS — Z85.828 HISTORY OF NONMELANOMA SKIN CANCER: ICD-10-CM

## 2019-11-07 PROCEDURE — 3008F BODY MASS INDEX DOCD: CPT | Mod: CPTII,S$GLB,, | Performed by: DERMATOLOGY

## 2019-11-07 PROCEDURE — 17110 PR DESTRUCTION BENIGN LESIONS UP TO 14: ICD-10-PCS | Mod: S$GLB,,, | Performed by: DERMATOLOGY

## 2019-11-07 PROCEDURE — 17000 PR DESTRUCTION(LASER SURGERY,CRYOSURGERY,CHEMOSURGERY),PREMALIGNANT LESIONS,FIRST LESION: ICD-10-PCS | Mod: 59,S$GLB,, | Performed by: DERMATOLOGY

## 2019-11-07 PROCEDURE — 17000 DESTRUCT PREMALG LESION: CPT | Mod: 59,S$GLB,, | Performed by: DERMATOLOGY

## 2019-11-07 PROCEDURE — 17003 DESTRUCTION, PREMALIGNANT LESIONS; SECOND THROUGH 14 LESIONS: ICD-10-PCS | Mod: 59,S$GLB,, | Performed by: DERMATOLOGY

## 2019-11-07 PROCEDURE — 17003 DESTRUCT PREMALG LES 2-14: CPT | Mod: 59,S$GLB,, | Performed by: DERMATOLOGY

## 2019-11-07 PROCEDURE — 99999 PR PBB SHADOW E&M-EST. PATIENT-LVL III: CPT | Mod: PBBFAC,,, | Performed by: DERMATOLOGY

## 2019-11-07 PROCEDURE — 3008F PR BODY MASS INDEX (BMI) DOCUMENTED: ICD-10-PCS | Mod: CPTII,S$GLB,, | Performed by: DERMATOLOGY

## 2019-11-07 PROCEDURE — 17110 DESTRUCTION B9 LES UP TO 14: CPT | Mod: S$GLB,,, | Performed by: DERMATOLOGY

## 2019-11-07 PROCEDURE — 99999 PR PBB SHADOW E&M-EST. PATIENT-LVL III: ICD-10-PCS | Mod: PBBFAC,,, | Performed by: DERMATOLOGY

## 2019-11-07 PROCEDURE — 99214 OFFICE O/P EST MOD 30 MIN: CPT | Mod: 25,S$GLB,, | Performed by: DERMATOLOGY

## 2019-11-07 PROCEDURE — 99214 PR OFFICE/OUTPT VISIT, EST, LEVL IV, 30-39 MIN: ICD-10-PCS | Mod: 25,S$GLB,, | Performed by: DERMATOLOGY

## 2019-11-07 NOTE — PATIENT INSTRUCTIONS

## 2019-11-07 NOTE — PROGRESS NOTES
Subjective:       Patient ID:  Bing Burch is a 58 y.o. female who presents for   Chief Complaint   Patient presents with    Spot     nose, scabs, x 2 wks, no tx    Skin Check     TBSE     Patient last seen 04/11/2019    BCC left alar groove s/p Mohs Dr CARTY with reconstruction by Dr Reddy 02/2019  H/o AKs treated witch cryo     H/o intense sun exposure, youth in [place], blistering sunburns    Patient has a spot on her nose that sometimes scabs, that has been present for about 2 weeks, no tx  Patient also desires a TBSE.       Review of Systems   Constitutional: Negative for fever, chills and fatigue.   Skin: Positive for daily sunscreen use, activity-related sunscreen use and wears hat.   Hematologic/Lymphatic: Does not bruise/bleed easily.        Objective:    Physical Exam   Constitutional: She appears well-developed and well-nourished. No distress.   Neurological: She is alert and oriented to person, place, and time. She is not disoriented.   Psychiatric: She has a normal mood and affect.   Skin:   Areas Examined (abnormalities noted in diagram):   Scalp / Hair Palpated and Inspected  Head / Face Inspection Performed  Neck Inspection Performed  Chest / Axilla Inspection Performed  Abdomen Inspection Performed  Genitals / Buttocks / Groin Inspection Performed  Back Inspection Performed  RUE Inspected  LUE Inspection Performed  RLE Inspected  LLE Inspection Performed  Nails and Digits Inspection Performed                       Diagram Legend     Erythematous scaling macule/papule c/w actinic keratosis       Vascular papule c/w angioma      Pigmented verrucoid papule/plaque c/w seborrheic keratosis      Yellow umbilicated papule c/w sebaceous hyperplasia      Irregularly shaped tan macule c/w lentigo     1-2 mm smooth white papules consistent with Milia      Movable subcutaneous cyst with punctum c/w epidermal inclusion cyst      Subcutaneous movable cyst c/w pilar cyst      Firm pink to brown papule c/w  dermatofibroma      Pedunculated fleshy papule(s) c/w skin tag(s)      Evenly pigmented macule c/w junctional nevus     Mildly variegated pigmented, slightly irregular-bordered macule c/w mildly atypical nevus      Flesh colored to evenly pigmented papule c/w intradermal nevus       Pink pearly papule/plaque c/w basal cell carcinoma      Erythematous hyperkeratotic cursted plaque c/w SCC      Surgical scar with no sign of skin cancer recurrence      Open and closed comedones      Inflammatory papules and pustules      Verrucoid papule consistent consistent with wart     Erythematous eczematous patches and plaques     Dystrophic onycholytic nail with subungual debris c/w onychomycosis     Umbilicated papule    Erythematous-base heme-crusted tan verrucoid plaque consistent with inflamed seborrheic keratosis     Erythematous Silvery Scaling Plaque c/w Psoriasis     See annotation      Assessment / Plan:        Actinic keratoses  Cryosurgery Procedure Note    Verbal consent from the patient is obtained and the patient is aware of the precancerous quality and need for treatment of these lesions. Liquid nitrogen cryosurgery is applied to the 2 actinic keratoses, as detailed in the physical exam, to produce a freeze injury. The patient is aware that blisters may form and is instructed on wound care with gentle cleansing and use of vaseline ointment to keep moist until healed. The patient is supplied a handout on cryosurgery and is instructed to call if lesions do not completely resolve.    Common wart. L;eft popliteal fossa  Cryosurgery procedure note:    Verbal consent from the patient is obtained. Liquid nitrogen cryosurgery is applied to 1 lesions to produce a freeze injury. The patient is aware that blisters may form and is instructed on wound care with gentle cleansing and use of vaseline ointment to keep moist until healed. The patient is supplied a handout on cryosurgery and is instructed to call if lesions do not  completely resolve.    Seborrheic keratoses  These are benign inherited growths without a malignant potential. Reassurance given to patient. No treatment is necessary.     Solar lentigo  This is a benign hyperpigmented sun induced lesion. Daily sun protection will reduce the number of new lesions. Treatment of these benign lesions are considered cosmetic.    History of nonmelanoma skin cancer  Area of previous melanoma examined. Site well healed with no signs of recurrence.    Total body skin examination performed today including at least 12 points as noted in physical examination. No lesions suspicious for malignancy noted.    Cherry angioma  This is a benign vascular lesion. Reassurance given. No treatment required.     Multiple benign nevi  Monitor for new mole or moles that are becoming bigger, darker, irritated, or developing irregular borders.     Patient instructed in importance in daily sun protection of at least spf 30. Mineral sunscreen ingredients preferred (Zinc +/- Titanium).   Recommend Elta MD for daily use on face and neck.  Patient encouraged to wear hat for all outdoor exposure.   Also discussed sun avoidance and use of protective clothing.           Follow up in about 1 year (around 11/7/2020).

## 2019-11-13 ENCOUNTER — TELEPHONE (OUTPATIENT)
Dept: GASTROENTEROLOGY | Facility: CLINIC | Age: 59
End: 2019-11-13

## 2019-11-13 NOTE — TELEPHONE ENCOUNTER
----- Message from Attila Wei MD sent at 11/13/2019  4:22 PM CST -----  Please notify patient that biopsies reviewed and showed no bacteria.  Continue current meds and follow up as previously planned.

## 2019-11-18 ENCOUNTER — OFFICE VISIT (OUTPATIENT)
Dept: FAMILY MEDICINE | Facility: CLINIC | Age: 59
End: 2019-11-18
Payer: COMMERCIAL

## 2019-11-18 ENCOUNTER — DOCUMENTATION ONLY (OUTPATIENT)
Dept: FAMILY MEDICINE | Facility: CLINIC | Age: 59
End: 2019-11-18

## 2019-11-18 VITALS
WEIGHT: 199.94 LBS | TEMPERATURE: 99 F | DIASTOLIC BLOOD PRESSURE: 72 MMHG | BODY MASS INDEX: 32.13 KG/M2 | HEIGHT: 66 IN | OXYGEN SATURATION: 95 % | SYSTOLIC BLOOD PRESSURE: 130 MMHG | HEART RATE: 94 BPM

## 2019-11-18 DIAGNOSIS — J06.9 RECURRENT URI (UPPER RESPIRATORY INFECTION): Primary | ICD-10-CM

## 2019-11-18 PROCEDURE — 99999 PR PBB SHADOW E&M-EST. PATIENT-LVL IV: CPT | Mod: PBBFAC,,, | Performed by: PHYSICIAN ASSISTANT

## 2019-11-18 PROCEDURE — 99213 OFFICE O/P EST LOW 20 MIN: CPT | Mod: S$GLB,,, | Performed by: PHYSICIAN ASSISTANT

## 2019-11-18 PROCEDURE — 3008F PR BODY MASS INDEX (BMI) DOCUMENTED: ICD-10-PCS | Mod: CPTII,S$GLB,, | Performed by: PHYSICIAN ASSISTANT

## 2019-11-18 PROCEDURE — 99999 PR PBB SHADOW E&M-EST. PATIENT-LVL IV: ICD-10-PCS | Mod: PBBFAC,,, | Performed by: PHYSICIAN ASSISTANT

## 2019-11-18 PROCEDURE — 3008F BODY MASS INDEX DOCD: CPT | Mod: CPTII,S$GLB,, | Performed by: PHYSICIAN ASSISTANT

## 2019-11-18 PROCEDURE — 99213 PR OFFICE/OUTPT VISIT, EST, LEVL III, 20-29 MIN: ICD-10-PCS | Mod: S$GLB,,, | Performed by: PHYSICIAN ASSISTANT

## 2019-11-18 RX ORDER — FLUTICASONE PROPIONATE 50 MCG
2 SPRAY, SUSPENSION (ML) NASAL DAILY
Qty: 1 BOTTLE | Refills: 0 | Status: SHIPPED | OUTPATIENT
Start: 2019-11-18

## 2019-11-18 RX ORDER — LEVOCETIRIZINE DIHYDROCHLORIDE 5 MG/1
5 TABLET, FILM COATED ORAL NIGHTLY
Qty: 30 TABLET | Refills: 2 | Status: SHIPPED | OUTPATIENT
Start: 2019-11-18 | End: 2020-03-11

## 2019-11-18 NOTE — PROGRESS NOTES
Pre-Visit Chart Review  For Appointment Scheduled on (11/18/19)    Health Maintenance Due   Topic Date Due    Pneumococcal Vaccine (Highest Risk) (1 of 3 - PCV13) 11/30/1978    Colonoscopy  08/14/2016

## 2019-11-18 NOTE — PROGRESS NOTES
"Subjective:       Patient ID: Bing Burch is a 58 y.o. female.    Chief Complaint: Sinus Problem ((6 weeks)) and Other (Sore Throat, Heavy Sinus Drainage, Cough)    Patient presents for evaluation of recurrent rhinorrhea, post nasal drip and cough .  States 5-6 "sinus infections" this year.  Taking zyrtec no relief.  Has GERD with Hiatal hernia and recently had egd and eso dilation.  Takes PPI.  Dogs at home but no new pets.     Sinus Problem   This is a recurrent problem. The current episode started more than 1 month ago. The problem has been waxing and waning since onset. There has been no fever. Associated symptoms include congestion, coughing, sinus pressure, sneezing and a sore throat. Pertinent negatives include no chills, ear pain, headaches or shortness of breath. Past treatments include saline sprays (and zyrtec ). The treatment provided no relief.     Review of Systems   Constitutional: Positive for fatigue. Negative for activity change, appetite change, chills and fever.   HENT: Positive for congestion, postnasal drip, rhinorrhea, sinus pressure, sneezing, sore throat and voice change. Negative for ear pain.    Respiratory: Positive for cough. Negative for shortness of breath and wheezing.    Cardiovascular: Negative for chest pain.   Gastrointestinal: Negative for nausea and vomiting.   Neurological: Negative for headaches.       Objective:      Physical Exam   Constitutional: She appears well-developed and well-nourished. No distress.   HENT:   Head: Normocephalic and atraumatic.   Right Ear: Tympanic membrane, external ear and ear canal normal.   Left Ear: Tympanic membrane, external ear and ear canal normal.   Nose: Mucosal edema present.   Mouth/Throat: Oropharynx is clear and moist. Mucous membranes are not dry. No oropharyngeal exudate, posterior oropharyngeal edema or posterior oropharyngeal erythema.   Cardiovascular: Normal rate and regular rhythm.   No murmur heard.  Pulmonary/Chest: " Effort normal and breath sounds normal. She has no wheezes. She has no rales.   Lymphadenopathy:     She has no cervical adenopathy.   Skin: Skin is warm and dry.   Nursing note and vitals reviewed.      Assessment:       1. Recurrent URI (upper respiratory infection)        Plan:       Bing was seen today for sinus problem and other.    Diagnoses and all orders for this visit:    Recurrent URI (upper respiratory infection)  -     Ambulatory referral to Allergy    Other orders  -     levocetirizine (XYZAL) 5 MG tablet; Take 1 tablet (5 mg total) by mouth every evening.  -     fluticasone propionate (FLONASE) 50 mcg/actuation nasal spray; 2 sprays (100 mcg total) by Each Nostril route once daily.

## 2019-11-19 ENCOUNTER — OFFICE VISIT (OUTPATIENT)
Dept: ALLERGY | Facility: CLINIC | Age: 59
End: 2019-11-19
Payer: COMMERCIAL

## 2019-11-19 ENCOUNTER — LAB VISIT (OUTPATIENT)
Dept: LAB | Facility: HOSPITAL | Age: 59
End: 2019-11-19
Attending: ALLERGY & IMMUNOLOGY
Payer: COMMERCIAL

## 2019-11-19 VITALS — HEART RATE: 95 BPM | BODY MASS INDEX: 32.13 KG/M2 | HEIGHT: 66 IN | OXYGEN SATURATION: 97 % | WEIGHT: 199.94 LBS

## 2019-11-19 DIAGNOSIS — J32.9 FREQUENT SINUS INFECTIONS: ICD-10-CM

## 2019-11-19 DIAGNOSIS — J31.0 CHRONIC RHINITIS: Primary | ICD-10-CM

## 2019-11-19 DIAGNOSIS — J31.0 CHRONIC RHINITIS: ICD-10-CM

## 2019-11-19 LAB
IGA SERPL-MCNC: 294 MG/DL (ref 40–350)
IGG SERPL-MCNC: 1015 MG/DL (ref 650–1600)
IGM SERPL-MCNC: 109 MG/DL (ref 50–300)

## 2019-11-19 PROCEDURE — 3008F BODY MASS INDEX DOCD: CPT | Mod: CPTII,S$GLB,, | Performed by: ALLERGY & IMMUNOLOGY

## 2019-11-19 PROCEDURE — 86003 ALLG SPEC IGE CRUDE XTRC EA: CPT

## 2019-11-19 PROCEDURE — 86003 ALLG SPEC IGE CRUDE XTRC EA: CPT | Mod: 59

## 2019-11-19 PROCEDURE — 86774 TETANUS ANTIBODY: CPT

## 2019-11-19 PROCEDURE — 82785 ASSAY OF IGE: CPT

## 2019-11-19 PROCEDURE — 99204 OFFICE O/P NEW MOD 45 MIN: CPT | Mod: S$GLB,,, | Performed by: ALLERGY & IMMUNOLOGY

## 2019-11-19 PROCEDURE — 82784 ASSAY IGA/IGD/IGG/IGM EACH: CPT

## 2019-11-19 PROCEDURE — 99999 PR PBB SHADOW E&M-EST. PATIENT-LVL III: CPT | Mod: PBBFAC,,, | Performed by: ALLERGY & IMMUNOLOGY

## 2019-11-19 PROCEDURE — 99204 PR OFFICE/OUTPT VISIT, NEW, LEVL IV, 45-59 MIN: ICD-10-PCS | Mod: S$GLB,,, | Performed by: ALLERGY & IMMUNOLOGY

## 2019-11-19 PROCEDURE — 99999 PR PBB SHADOW E&M-EST. PATIENT-LVL III: ICD-10-PCS | Mod: PBBFAC,,, | Performed by: ALLERGY & IMMUNOLOGY

## 2019-11-19 PROCEDURE — 36415 COLL VENOUS BLD VENIPUNCTURE: CPT | Mod: PO

## 2019-11-19 PROCEDURE — 3008F PR BODY MASS INDEX (BMI) DOCUMENTED: ICD-10-PCS | Mod: CPTII,S$GLB,, | Performed by: ALLERGY & IMMUNOLOGY

## 2019-11-19 RX ORDER — IPRATROPIUM BROMIDE 21 UG/1
1 SPRAY, METERED NASAL 4 TIMES DAILY PRN
Qty: 30 ML | Refills: 11 | Status: SHIPPED | OUTPATIENT
Start: 2019-11-19 | End: 2020-03-11

## 2019-11-19 NOTE — PROGRESS NOTES
"ALLERGY & IMMUNOLOGY CLINIC - INITIAL CONSULTATION      HISTORY OF PRESENT ILLNESS     Patient ID: Bing Burch is a 58 y.o. female    CC: query immunodeficiency    HPI: 57 yo woman presents for initial evaluation.     Referred by NP Sarah Van for frequent infections.     Sept 2019: sinus infection, treated with augmentin, symptoms did improve.  October 2019: symptoms returned, took OTC meds, felt like she was sick all month.   Nov 2019: symptoms returned again and was diagnosed with another sinus infection.  2001: Had pneumonia, was briefly admitted to the hospital.   One history of a "spider bite" but no other skin infections.   No ear infections as an adult.   No refractory or unusual infections.     Estimates 3+ courses of antibiotics in 2019.     Sinus infection symptoms include heavy post-nasal drip, hoarseness, coughing to the point of choking, congestion. When she can blow mucous out of her nose, it looks green and yellow. Symptoms are associated with eye discharge and crusting and ocular dryness and pruritus. Sense of smell is intact.     She takes zyrtec without significant relief. Dayquil and nyquil help temporarily. Not on any nasal sprays, has recently prescribed flonase though. Intermittently uses nasal saline with some relief.     History of heartburn, on nexium with some relief. Had hiatal hernia repair in 2013, but has developed another hiatal hernia since then. Recently had esophageal dilation 2/2 food impaction.      REVIEW OF SYSTEMS     CONST: no F/C/NS, no unintentional weight changes  NEURO: no H/A, no weakness, no paresthesias  EYES: + discharge, + pruritus, no erythema  EARS: no hearing loss, no sensation of fullness  NOSE: + congestion, + rhinorrhea, no itching, no sneezing  PULM: no SOB, no wheezing, + cough  CV: no CP, no palpitations, no leg swelling  GI: + dysphagia, no heartburn, no pain, no N/V/D  DERM: no rashes, no skin breaks     MEDICAL HISTORY     MedHx: history of " "psoriasis that has gone away  SurgHx: sinus surgery and polypectomy in 1998.   SocHx: no tob, has cats and dogs  FamHx: brother with psoriatic arthritis, mom with thyroid disease  Allergies: see below  Medications: MAR reviewed  Vaccines: UTD including flu    H/o Asthma: denies  H/o Eczema: denies  H/o Rhinitis: ??  Oral Allergy:  denies  Food Allergy: denies  Venom Allergy: denies  Latex Allergy: denies     PHYSICAL EXAM     VS: Pulse 95   Ht 5' 6" (1.676 m)   Wt 90.7 kg (199 lb 15.3 oz)   SpO2 97%   BMI 32.27 kg/m²   GENERAL: alert, NAD, well-appearing, cooperative  EYES: PERRL, EOMI, no conjunctival injection, no discharge, no infraorbital shiners  EARS: external auditory canals normal B/L, TM normal B/L  NOSE: NT 2+ and pink B/L, no stringing mucous, no polyps  ORAL: MMM, no ulcers, no thrush, low-lying palate  NECK: supple, trachea midline, no thyromegaly, no LAD  LUNGS: CTAB, no w/r/c, no increased WOB  HEART: RRR, normal S1/S2, no m/g/r  EXTREMITIES: +2 distal pulses, no c/c/e  LYMPHATICS: no cervical/submandibular LAD  DERM: no rashes, no skin breaks, no dystrophic fingernails  NEURO: normal gait, no facial asymmetry     ALLERGEN TESTING     Never been done before.      ASSESSMENT & PLAN     Bing Burch is a 58 y.o. female with     Chronic rhinitis, possibly allergic in etiology  History of frequent sinus infections  GERD    Plan:   Immunocaps to evaluate for atopic etiology of rhinitis.  Flonase 1 SEN BID and ipratropium 1-2 SEN prn rhinorrhea/PND  Humoral immunodeficiency workup  Continue anti-reflux medications, as uncontrolled reflux can exacerbate rhinitis symptoms.     Follow up: based on lab results    Mirtha Bowling MD  Allergy/Immunology    "

## 2019-11-19 NOTE — LETTER
November 19, 2019      [facility]  [address]  [place] [address]           [facility]  [address]  [place] [address]  Phone: [phone]          Patient: Bing Burch   MR Number: [id number]   YOB: 1960   Date of Visit: 11/19/2019       Dear Sarah Van:    Thank you for referring Bing Burch to me for evaluation. Attached you will find relevant portions of my assessment and plan of care.    If you have questions, please do not hesitate to call me. I look forward to following Bing Burch along with you.    Sincerely,    Mirtha Bowling MD    Enclosure  CC:  No Recipients    If you would like to receive this communication electronically, please contact [email or web address] or [phone] to request more information on [employer] Link access.    For providers and/or their staff who would like to refer a patient to [facility], please contact us through our one-stop-shop provider referral line, [facility], at [phone].    If you feel you have received this communication in error or would no longer like to receive these types of communications, please e-mail [email or web address]

## 2019-11-20 LAB — IGE SERPL-ACNC: <35 IU/ML (ref 0–100)

## 2019-11-25 LAB
A ALTERNATA IGE QN: <0.1 KU/L
A FUMIGATUS IGE QN: <0.1 KU/L
BERMUDA GRASS IGE QN: <0.1 KU/L
CAT DANDER IGE QN: <0.1 KU/L
CEDAR IGE QN: <0.1 KU/L
D FARINAE IGE QN: <0.1 KU/L
D PTERONYSS IGE QN: <0.1 KU/L
DEPRECATED A ALTERNATA IGE RAST QL: NORMAL
DEPRECATED A FUMIGATUS IGE RAST QL: NORMAL
DEPRECATED BERMUDA GRASS IGE RAST QL: NORMAL
DEPRECATED CAT DANDER IGE RAST QL: NORMAL
DEPRECATED CEDAR IGE RAST QL: NORMAL
DEPRECATED D FARINAE IGE RAST QL: NORMAL
DEPRECATED D PTERONYSS IGE RAST QL: NORMAL
DEPRECATED DOG DANDER IGE RAST QL: NORMAL
DEPRECATED ELDER IGE RAST QL: NORMAL
DEPRECATED ENGL PLANTAIN IGE RAST QL: NORMAL
DEPRECATED PECAN/HICK TREE IGE RAST QL: NORMAL
DEPRECATED ROACH IGE RAST QL: NORMAL
DEPRECATED TIMOTHY IGE RAST QL: NORMAL
DEPRECATED WEST RAGWEED IGE RAST QL: NORMAL
DEPRECATED WHITE OAK IGE RAST QL: NORMAL
DOG DANDER IGE QN: <0.1 KU/L
ELDER IGE QN: <0.1 KU/L
ENGL PLANTAIN IGE QN: <0.1 KU/L
PECAN/HICK TREE IGE QN: <0.1 KU/L
ROACH IGE QN: <0.1 KU/L
TIMOTHY IGE QN: <0.1 KU/L
WEST RAGWEED IGE QN: <0.1 KU/L
WHITE OAK IGE QN: <0.1 KU/L

## 2019-12-02 LAB
C DIPHTHERIAE AB SER IA-ACNC: 0.65 IU/ML
C TETANI AB SER-ACNC: >5.33 IU/ML
DEPRECATED S PNEUM 1 IGG SER-MCNC: 33.3 MCG/ML
DEPRECATED S PNEUM12 IGG SER-MCNC: 7.8 MCG/ML
DEPRECATED S PNEUM14 IGG SER-MCNC: <0.3 MCG/ML
DEPRECATED S PNEUM19 IGG SER-MCNC: 3.7 MCG/ML
DEPRECATED S PNEUM23 IGG SER-MCNC: 1.2 MCG/ML
DEPRECATED S PNEUM3 IGG SER-MCNC: 4.3 MCG/ML
DEPRECATED S PNEUM4 IGG SER-MCNC: <0.3 MCG/ML
DEPRECATED S PNEUM5 IGG SER-MCNC: 71.9 MCG/ML
DEPRECATED S PNEUM8 IGG SER-MCNC: 1.7 MCG/ML
DEPRECATED S PNEUM9 IGG SER-MCNC: 4 MCG/ML
HAEM INFLU B IGG SER-MCNC: 5.05 MCG/ML
S PNEUM DA 18C IGG SER-MCNC: 4.6 MCG/ML
S PNEUM DA 6B IGG SER-MCNC: 0.4 MCG/ML
S PNEUM DA 7F IGG SER-MCNC: 26.4 MCG/ML
S PNEUM DA 9V IGG SER-MCNC: 30.2 MCG/ML

## 2019-12-03 ENCOUNTER — PATIENT MESSAGE (OUTPATIENT)
Dept: ALLERGY | Facility: CLINIC | Age: 59
End: 2019-12-03

## 2020-02-20 ENCOUNTER — PATIENT MESSAGE (OUTPATIENT)
Dept: HEMATOLOGY/ONCOLOGY | Facility: CLINIC | Age: 60
End: 2020-02-20

## 2020-03-10 ENCOUNTER — DOCUMENTATION ONLY (OUTPATIENT)
Dept: FAMILY MEDICINE | Facility: CLINIC | Age: 60
End: 2020-03-10

## 2020-03-10 NOTE — PROGRESS NOTES
Pre-Visit Chart Review  For Appointment Scheduled on 3/11/2020    Health Maintenance Due   Topic Date Due    Pneumococcal Vaccine (Highest Risk) (1 of 3 - PCV13) 11/30/1978    Colonoscopy  08/14/2016

## 2020-03-11 ENCOUNTER — OFFICE VISIT (OUTPATIENT)
Dept: FAMILY MEDICINE | Facility: CLINIC | Age: 60
End: 2020-03-11
Payer: COMMERCIAL

## 2020-03-11 VITALS
SYSTOLIC BLOOD PRESSURE: 128 MMHG | TEMPERATURE: 98 F | BODY MASS INDEX: 32.59 KG/M2 | HEIGHT: 66 IN | HEART RATE: 90 BPM | DIASTOLIC BLOOD PRESSURE: 70 MMHG | OXYGEN SATURATION: 95 % | WEIGHT: 202.81 LBS

## 2020-03-11 DIAGNOSIS — Z00.00 HEALTHCARE MAINTENANCE: Primary | ICD-10-CM

## 2020-03-11 DIAGNOSIS — Z78.0 ASYMPTOMATIC MENOPAUSAL STATE: ICD-10-CM

## 2020-03-11 PROCEDURE — 99396 PR PREVENTIVE VISIT,EST,40-64: ICD-10-PCS | Mod: S$GLB,,, | Performed by: FAMILY MEDICINE

## 2020-03-11 PROCEDURE — 99999 PR PBB SHADOW E&M-EST. PATIENT-LVL III: ICD-10-PCS | Mod: PBBFAC,,, | Performed by: FAMILY MEDICINE

## 2020-03-11 PROCEDURE — 99999 PR PBB SHADOW E&M-EST. PATIENT-LVL III: CPT | Mod: PBBFAC,,, | Performed by: FAMILY MEDICINE

## 2020-03-11 PROCEDURE — 99396 PREV VISIT EST AGE 40-64: CPT | Mod: S$GLB,,, | Performed by: FAMILY MEDICINE

## 2020-03-11 NOTE — PROGRESS NOTES
Subjective:   Patient ID: Bing Burch is a 59 y.o. female     Chief Complaint:Follow-up (6 months)      Patient here for checkup.  Patient sources bilateral lower feet pain. The going on for years.  Worse with activity.    Review of Systems   Constitutional: Negative for activity change, chills, fever and unexpected weight change.   HENT: Negative for hearing loss, rhinorrhea, sore throat and trouble swallowing.    Eyes: Negative for discharge and visual disturbance.   Respiratory: Negative for chest tightness, shortness of breath and wheezing.    Cardiovascular: Negative for chest pain and palpitations.   Gastrointestinal: Negative for abdominal pain, blood in stool, constipation, diarrhea and vomiting.   Endocrine: Negative for polydipsia and polyuria.   Genitourinary: Negative for difficulty urinating, dysuria, hematuria and menstrual problem.   Musculoskeletal: Negative for arthralgias, back pain, joint swelling and neck pain.   Skin: Negative for color change.   Neurological: Negative for weakness and headaches.   Psychiatric/Behavioral: Negative for agitation, confusion and dysphoric mood.     Past Medical History:   Diagnosis Date    Arthritis     Basal cell carcinoma 10/2018    left alar groove MOHS    Fatty liver     GERD (gastroesophageal reflux disease)     Irritable bowel syndrome     mixed type, with slightly predominant constipation     Objective:     Vitals:    03/11/20 0800   BP: 128/70   Pulse: 90   Temp: 97.5 °F (36.4 °C)     There is no height or weight on file to calculate BMI.  Physical Exam   Constitutional: She is oriented to person, place, and time. No distress.   HENT:   Head: Normocephalic and atraumatic.   Eyes: Conjunctivae are normal. No scleral icterus.   Neck: No JVD present. No tracheal deviation present.   Cardiovascular: Normal rate and regular rhythm.   Pulmonary/Chest: Effort normal and breath sounds normal.   Abdominal: She exhibits no distension. There is no  tenderness.   Musculoskeletal: Normal range of motion. She exhibits no deformity.   Neurological: She is alert and oriented to person, place, and time.   Skin: Skin is warm and dry. She is not diaphoretic.   Psychiatric: She has a normal mood and affect. Her behavior is normal.     Assessment:     1. Healthcare maintenance    2. Asymptomatic menopausal state      Plan:   Healthcare maintenance  -     DXA Bone Density Spine And Hip; Future; Expected date: 03/11/2020    Asymptomatic menopausal state  -     DXA Bone Density Spine And Hip; Future; Expected date: 03/11/2020        Tej Blankenship MD  03/11/2020    Portions of this note have been dictated with DONA Camacho

## 2020-03-16 ENCOUNTER — HOSPITAL ENCOUNTER (OUTPATIENT)
Dept: RADIOLOGY | Facility: CLINIC | Age: 60
Discharge: HOME | End: 2020-03-16
Attending: FAMILY MEDICINE
Payer: COMMERCIAL

## 2020-03-16 DIAGNOSIS — Z78.0 ASYMPTOMATIC MENOPAUSAL STATE: ICD-10-CM

## 2020-03-16 DIAGNOSIS — Z00.00 HEALTHCARE MAINTENANCE: ICD-10-CM

## 2020-03-16 PROCEDURE — 77080 DXA BONE DENSITY AXIAL: CPT | Mod: TC,PO

## 2020-03-16 PROCEDURE — 77080 DEXA BONE DENSITY SPINE HIP: ICD-10-PCS | Mod: 26,,, | Performed by: RADIOLOGY

## 2020-03-16 PROCEDURE — 77080 DXA BONE DENSITY AXIAL: CPT | Mod: 26,,, | Performed by: RADIOLOGY

## 2020-03-27 ENCOUNTER — PATIENT MESSAGE (OUTPATIENT)
Dept: FAMILY MEDICINE | Facility: CLINIC | Age: 60
End: 2020-03-27

## 2020-03-30 ENCOUNTER — OFFICE VISIT (OUTPATIENT)
Dept: FAMILY MEDICINE | Facility: CLINIC | Age: 60
End: 2020-03-30
Payer: COMMERCIAL

## 2020-03-30 DIAGNOSIS — R07.89 CHEST DISCOMFORT: Primary | ICD-10-CM

## 2020-03-30 PROCEDURE — 99214 OFFICE O/P EST MOD 30 MIN: CPT | Mod: 95,,, | Performed by: FAMILY MEDICINE

## 2020-03-30 PROCEDURE — 99214 PR OFFICE/OUTPT VISIT, EST, LEVL IV, 30-39 MIN: ICD-10-PCS | Mod: 95,,, | Performed by: FAMILY MEDICINE

## 2020-03-30 RX ORDER — FAMOTIDINE 40 MG/1
40 TABLET, FILM COATED ORAL DAILY
Qty: 90 TABLET | Refills: 3
Start: 2020-03-30 | End: 2021-03-30

## 2020-03-30 RX ORDER — NAPROXEN SODIUM 220 MG/1
81 TABLET, FILM COATED ORAL DAILY
Qty: 90 TABLET | Refills: 3
Start: 2020-03-30 | End: 2021-03-30

## 2020-03-30 NOTE — PROGRESS NOTES
The patient location is:  [place]  The chief complaint leading to consultation is:  Chest tightness  Visit type: Virtual visit with synchronous audio and video  Total time spent with patient:  15 min  Each patient to whom he or she provides medical services by telemedicine is:  (1) informed of the relationship between the physician and patient and the respective role of any other health care provider with respect to management of the patient; and (2) notified that he or she may decline to receive medical services by telemedicine and may withdraw from such care at any time.    Notes:  See below    Subjective:   Patient ID: Bing Burch is a 59 y.o. female     Chief Complaint:Dizziness      Patient states around a week ago she had an episode of dizziness, diaphoresis, and chest tightness.  Since then she has also had some nausea and epigastric discomfort.  Patient states the episode occurred after working outdoors for extended period of time.  Since then patient has been drinking plenty of fluids and symptoms have improved.  Patient is still having the symptoms of dyspepsia.  Patient is concerned this is related to her heart.    Review of Systems   Respiratory: Positive for chest tightness. Negative for shortness of breath.    Cardiovascular: Negative for chest pain.   Gastrointestinal: Positive for abdominal pain and nausea.   Genitourinary: Negative for dysuria.     Past Medical History:   Diagnosis Date    Arthritis     Basal cell carcinoma 10/2018    left alar groove MOHS    Fatty liver     GERD (gastroesophageal reflux disease)     Irritable bowel syndrome     mixed type, with slightly predominant constipation     Objective:   There were no vitals filed for this visit.  There is no height or weight on file to calculate BMI.  Physical Exam   Constitutional: She is oriented to person, place, and time. She appears well-developed and well-nourished.   HENT:   Head: Normocephalic and atraumatic.   Eyes:  Conjunctivae are normal.   Pulmonary/Chest: Effort normal. No respiratory distress.   Neurological: She is alert and oriented to person, place, and time.   Skin: No pallor.   Psychiatric: She has a normal mood and affect. Her behavior is normal. Judgment and thought content normal.     Assessment:     1. Chest discomfort      Plan:   Chest discomfort  -     aspirin 81 MG Chew; Take 1 tablet (81 mg total) by mouth once daily.  Dispense: 90 tablet; Refill: 3  -     famotidine (PEPCID) 40 MG tablet; Take 1 tablet (40 mg total) by mouth once daily.  Dispense: 90 tablet; Refill: 3  Discussed at length patient the potential etiologies for this.  Did discuss how this could be simply due to dehydration I would recommend continue to drink 6-8 cups of water a day.  Patient very concerned this could be heart related.  Did discuss with patient the possibility of this.  Recommended patient follow-up urgently with cardiology but patient declined due to current Coronavirus outbreak.  Did discuss with patient potential treatment in the meantime would be starting daily aspirin therapy and follow-up if symptoms worsen or in the near future.  Patient understands this and the risks associated with not following up and accepts them.  Also some concern this could be due to dyspepsia patient is currently taking Nexium daily.  Will start patient on Pepcid in addition to Nexium.        Time spent with patient: 15 minutes and over half of that time was spent on counseling an coordination of care.    Tej Blankenship MD  03/30/2020    Portions of this note have been dictated with DONA Camacho

## 2020-10-01 LAB
HUMAN PAPILLOMAVIRUS (HPV): NORMAL
PAP SMEAR: NORMAL

## 2021-02-01 ENCOUNTER — PATIENT OUTREACH (OUTPATIENT)
Dept: ADMINISTRATIVE | Facility: HOSPITAL | Age: 61
End: 2021-02-01

## 2021-04-05 ENCOUNTER — PATIENT MESSAGE (OUTPATIENT)
Dept: ADMINISTRATIVE | Facility: HOSPITAL | Age: 61
End: 2021-04-05

## 2021-07-06 ENCOUNTER — PATIENT MESSAGE (OUTPATIENT)
Dept: ADMINISTRATIVE | Facility: HOSPITAL | Age: 61
End: 2021-07-06

## 2021-10-07 ENCOUNTER — PATIENT MESSAGE (OUTPATIENT)
Dept: ADMINISTRATIVE | Facility: HOSPITAL | Age: 61
End: 2021-10-07

## 2025-01-05 NOTE — PROGRESS NOTES
Pre-visit Chart review notification     PAST MEDICAL HISTORY:  CAD (coronary artery disease)     Diabetic vitreous hemorrhage     H/O: HTN (hypertension)     History of alcohol withdrawal delirium     Hypokalemia     Left ventricular hypertrophy     Type 2 diabetes mellitus

## (undated) DEVICE — BLADE SURG #15 CARBON STEEL

## (undated) DEVICE — SYR DISP LL 5CC

## (undated) DEVICE — APPLICATOR CHLORAPREP CLR 10.5

## (undated) DEVICE — SYS LABEL CORRECT MED

## (undated) DEVICE — GOWN SURGICAL X-LARGE

## (undated) DEVICE — SEE MEDLINE ITEM 146372

## (undated) DEVICE — GLOVE PROTEXIS PI PF SYNTH 5.5

## (undated) DEVICE — TUBING MINIBORE EXTENSION

## (undated) DEVICE — ELECTRODE REM PLYHSV RETURN 9

## (undated) DEVICE — BLADE SURGICAL 15C

## (undated) DEVICE — BAG PATIENT BELONGING

## (undated) DEVICE — GLOVE PROTEXIS PI CLASSIC 7.5

## (undated) DEVICE — NDL SAFETY 25G X 1.5 ECLIPSE

## (undated) DEVICE — NDL TUOHY EPIDURAL 20G X 3.5

## (undated) DEVICE — SEE MEDLINE ITEM 154981

## (undated) DEVICE — GOWN AERO CHROME W/ TOWEL XL

## (undated) DEVICE — DRAPE STERI INSTRUMENT 1018

## (undated) DEVICE — NDL HYPO REG 25G X 1 1/2

## (undated) DEVICE — NDL HYPODERMIC BLUNT 18G 1.5IN

## (undated) DEVICE — SKINMARKER & RULER REGULAR X-F

## (undated) DEVICE — SHEET EENT SPLIT

## (undated) DEVICE — SYR GLASS 5CC LUER LOK

## (undated) DEVICE — SEE MEDLINE ITEM 157128

## (undated) DEVICE — TRAY MINOR GEN SURG

## (undated) DEVICE — SPONGE GAUZE 16PLY 4X4

## (undated) DEVICE — DRAPE STERI-DRAPE 1000 17X11IN